# Patient Record
Sex: FEMALE | Race: WHITE | Employment: OTHER | URBAN - METROPOLITAN AREA
[De-identification: names, ages, dates, MRNs, and addresses within clinical notes are randomized per-mention and may not be internally consistent; named-entity substitution may affect disease eponyms.]

---

## 2017-01-18 LAB
ALBUMIN SERPL-MCNC: 4.4 G/DL (ref 3.5–4.8)
ALBUMIN/GLOB SERPL: 1.7 {RATIO} (ref 1.1–2.5)
ALP SERPL-CCNC: 76 IU/L (ref 39–117)
ALT SERPL-CCNC: 11 IU/L (ref 0–32)
AST SERPL-CCNC: 22 IU/L (ref 0–40)
BILIRUB SERPL-MCNC: 0.6 MG/DL (ref 0–1.2)
BUN SERPL-MCNC: 11 MG/DL (ref 8–27)
BUN/CREAT SERPL: 12 (ref 11–26)
CALCIUM SERPL-MCNC: 9.6 MG/DL (ref 8.7–10.3)
CHLORIDE SERPL-SCNC: 101 MMOL/L (ref 96–106)
CHOLEST SERPL-MCNC: 215 MG/DL (ref 100–199)
CO2 SERPL-SCNC: 26 MMOL/L (ref 18–29)
CREAT SERPL-MCNC: 0.89 MG/DL (ref 0.57–1)
GLOBULIN SER CALC-MCNC: 2.6 G/DL (ref 1.5–4.5)
GLUCOSE SERPL-MCNC: 109 MG/DL (ref 65–99)
HDL SERPL-SCNC: 28.4 UMOL/L
HDLC SERPL-MCNC: 37 MG/DL
INTERPRETATION, 910389: NORMAL
LDL SERPL QN: 20.1 NM
LDL SERPL-SCNC: 1775 NMOL/L
LDL SMALL SERPL-SCNC: 1183 NMOL/L
LDLC SERPL CALC-MCNC: 149 MG/DL (ref 0–99)
LP-IR SCORE SERPL: 64
POTASSIUM SERPL-SCNC: 4.2 MMOL/L (ref 3.5–5.2)
PROT SERPL-MCNC: 7 G/DL (ref 6–8.5)
SODIUM SERPL-SCNC: 143 MMOL/L (ref 134–144)
TRIGL SERPL-MCNC: 144 MG/DL (ref 0–149)

## 2017-01-19 ENCOUNTER — OFFICE VISIT (OUTPATIENT)
Dept: CARDIOLOGY CLINIC | Age: 77
End: 2017-01-19

## 2017-01-19 VITALS
HEART RATE: 58 BPM | HEIGHT: 62 IN | DIASTOLIC BLOOD PRESSURE: 86 MMHG | SYSTOLIC BLOOD PRESSURE: 140 MMHG | WEIGHT: 167 LBS | BODY MASS INDEX: 30.73 KG/M2

## 2017-01-19 DIAGNOSIS — E78.00 HYPERCHOLESTEROLEMIA: ICD-10-CM

## 2017-01-19 DIAGNOSIS — I77.9 BILATERAL CAROTID ARTERY DISEASE (HCC): ICD-10-CM

## 2017-01-19 DIAGNOSIS — I10 ESSENTIAL HYPERTENSION: Primary | ICD-10-CM

## 2017-01-19 RX ORDER — ROSUVASTATIN CALCIUM 10 MG/1
10 TABLET, COATED ORAL
Qty: 90 TAB | Refills: 3 | Status: SHIPPED | OUTPATIENT
Start: 2017-01-19 | End: 2017-11-13 | Stop reason: SDUPTHER

## 2017-01-19 RX ORDER — ASCORBIC ACID 500 MG
TABLET ORAL
COMMUNITY
End: 2017-04-28

## 2017-01-19 RX ORDER — HYDROCHLOROTHIAZIDE 25 MG/1
25 TABLET ORAL DAILY
Qty: 30 TAB | Refills: 0
Start: 2017-01-19 | End: 2017-10-31 | Stop reason: SDUPTHER

## 2017-01-19 RX ORDER — NEBIVOLOL 20 MG/1
20 TABLET ORAL DAILY
Qty: 28 TAB | Refills: 0 | Status: SHIPPED | COMMUNITY
Start: 2017-01-19 | End: 2017-10-31 | Stop reason: SDUPTHER

## 2017-01-19 NOTE — PATIENT INSTRUCTIONS

## 2017-01-19 NOTE — PROGRESS NOTES
Caesar Meléndez MD. [facility] [place]              Patient: Emerson Flores  :       Today's Date: 2017          HISTORY OF PRESENT ILLNESS:     History of Present Illness:  Ms. Gil Mondragon is here for follow-up. She is a little more tired than usual - not as active due to knee problems and the winter. No CP or SOB. BP has been OK. -140's. PAST MEDICAL HISTORY:     Past Medical History   Diagnosis Date    Arthritis     Asthma      albuterol    Elevated glucose      borderline elevated A1c    Hypercholesterolemia     Hypertension     MALINI on CPAP          Past Surgical History   Procedure Laterality Date    Echo stress       normal resting LV wall motion and no evidence of ischemia. The ejection fraction was 50-60%.  Holter monitor       frequent ventricular extrasystoles. Rare couplets and triplets. NSR during diary entries.  Hx hysterectomy      Hx other surgical       CARPAL TUNNEL BILATERAL, TRIGGER THUMB RELEASE    Hx orthopaedic       R RCR    Hx orthopaedic       R KNEE MENISCUS    Hx other surgical       Carotid dopplers - mild disease bilat    Hx other surgical       Lexiscan Cardiolite 12 - normal; LVEF 65%    Hx other surgical       Echo 12 - TDS, LVEF 60%, no sig valve disease, RVSP 25    Hx other surgical       Carotid Duplex 3/14/13 - 10-49% stenosis bilat     Hx other surgical       LE ZE's 3/14/13 - normal ZE's     Hx orthopaedic  2013     left tibia repair           MEDICATIONS:     Current Outpatient Prescriptions   Medication Sig Dispense Refill    ascorbic acid, vitamin C, (VITAMIN C) 500 mg tablet Take  by mouth.  hydroCHLOROthiazide (HYDRODIURIL) 25 mg tablet Take 1 Tab by mouth daily. 30 Tab 0    nebivolol (BYSTOLIC) 20 mg tablet Take 1 Tab by mouth daily. 28 Tab 0    pravastatin (PRAVACHOL) 20 mg tablet Take 1 Tab by mouth nightly.  90 Tab 3    aspirin delayed-release 81 mg tablet Take  by mouth daily.  potassium chloride (K-DUR, KLOR-CON) 10 mEq tablet Take 1 Tab by mouth two (2) times a day. 60 Tab 11    cyanocobalamin (VITAMIN B-12) 1,000 mcg/mL injection 1,000 mcg by IntraMUSCular route once. MONTHLY      MULTIVITAMIN (MULTIPLE VITAMINS PO) Take  by mouth daily.  ALBUTEROL SULFATE (PROVENTIL HFA IN) Take  by inhalation as needed.  108mcg/act PRN         Allergies   Allergen Reactions    Adhesive Tape-Silicones Rash    Azithromycin Shortness of Breath     Heart slows down    Codeine Hives     N/V    Dilaudid [Hydromorphone (Bulk)] Nausea and Vomiting    Dilaudid [Hydromorphone] Unknown (comments)    Ivp Dye [Fd And C Blue No.1] Hives     SWELLING    Norvasc [Amlodipine] Other (comments)     ELEVATED BP    Novocain [Procaine] Unknown (comments)    Sectral [Acebutolol] Hives and Swelling    Sulfa (Sulfonamide Antibiotics) Hives    Talwin [Pentazocine Lactate] Swelling    Tetanus Vaccines And Toxoid Shortness of Breath    Vasotec [Enalapril Maleate] Other (comments)     ELEVATED BP             SOCIAL HISTORY:     Social History   Substance Use Topics    Smoking status: Never Smoker    Smokeless tobacco: Never Used    Alcohol use No           FAMILY HISTORY:     Family History   Problem Relation Age of Onset    Cancer Mother      LUNG    Heart Disease Father 46    Coronary Artery Disease Father     Dementia Sister     Heart Disease Brother     Cancer Brother      COLON            REVIEW OF SYSTEMS:        Review of Systems:    Constitutional: Negative for fever, chills    HEENT: Negative for vision changes.    Respiratory: no cough    Cardiovascular: Negative for orthopnea, syncope, and PND.    Gastrointestinal: Negative for abdominal pain,or melena    Genitourinary: Negative for dysuria    Musculoskeletal: Negative for myalgias.    Skin: Negative for rash    Heme: No problems bleeding.    Neurological: Negative for speech change and focal weakness.    + restless leg syndrome    + Urinary frequency, diarrhea             PHYSICAL EXAM:        Physical Exam:    Visit Vitals    /86    Pulse (!) 58    Ht 5' 2\" (1.575 m)    Wt 167 lb (75.8 kg)    BMI 30.54 kg/m2      Patient appears generally well, mood and affect are appropriate and pleasant.    HEENT: Normocephalic, atraumatic. Morley Bones anicteric.  Hearing intact.    Neck Exam: Supple, no bruits    Lung Exam: Clear to auscultation, even breath sounds.    Cardiac Exam: Regular rate and rhythm with 1/6 systolic murmur    Abdomen: Soft, non-tender, normal bowel sounds.    Extremities: No lower extremity edema.    Vasc - 2+ DP's  Psych - appropriate affect    Neuro - non focal          LABS / OTHER STUDIES:         Component      Latest Ref Rng & Units 1/17/2017           7:39 AM   LDL-P      <1000 nmol/L 1775 (H)   LDL-C      0 - 99 mg/dL 149 (H)   HDL-C      >39 mg/dL 37 (L)   Triglycerides      0 - 149 mg/dL 144   Cholesterol, total      100 - 199 mg/dL 215 (H)   HDL-P (Total)      >=30.5 umol/L 28.4 (L)   Small LDL-P      <=527 nmol/L 1183 (H)   LDL size      >20.5 nm 20.1   LP-IR SCORE      <=45 64 (H)     Lab Results   Component Value Date/Time    Sodium 143 01/17/2017 07:39 AM    Potassium 4.2 01/17/2017 07:39 AM    Chloride 101 01/17/2017 07:39 AM    CO2 26 01/17/2017 07:39 AM    Anion gap 9 04/14/2013 04:20 AM    Glucose 109 01/17/2017 07:39 AM    BUN 11 01/17/2017 07:39 AM    Creatinine 0.89 01/17/2017 07:39 AM    BUN/Creatinine ratio 12 01/17/2017 07:39 AM    GFR est AA 73 01/17/2017 07:39 AM    GFR est non-AA 63 01/17/2017 07:39 AM    Calcium 9.6 01/17/2017 07:39 AM    Bilirubin, total 0.6 01/17/2017 07:39 AM    ALT 11 01/17/2017 07:39 AM    AST 22 01/17/2017 07:39 AM    Alk.  phosphatase 76 01/17/2017 07:39 AM    Protein, total 7.0 01/17/2017 07:39 AM    Albumin 4.4 01/17/2017 07:39 AM    Globulin 3.5 04/09/2013 08:08 PM    A-G Ratio 1.7 01/17/2017 07:39 AM           Labs 5/16 - CMP OK (glc 106), chol 212, , HDL 35, , A1c 5.4  Labs 11/16 - , chol 197, HDL 37, , CMP OK             CARDIAC DIAGNOSTICS:       EKG 1/7/16 - sinus bradycardia, normal   EKG 1/19/17 - sinus marylin, otherwise normal     Carotid Duplex 7/14/16 - 0-9% RITA and 60-01% LICA           ASSESSMENT AND PLAN:        Assessment and Plan:    1) HTN    - She had problems with lightheadedness which is better now. Symptoms have been suggestive of orthostatic hypotension. She has supine HTN. Previously symptoms improved somewhat after cutting back Hyzaar. Due to risk of orthostasis with Hyzaar, I stopped that. I then tried labetalol, but due to vague complaints with labetalol I had to stop that. Due to leg pains, we stopped Norvasc. Due to dry mouth, she couldn't tolerate even 0.1 QHS of clonidine.    - She is tolerating Bystolic 20 mg daily and HCTZ 25 mg daily.   - Her dizziness is better lately and now very brief. Would be OK with -140's.    - She is working on salt reduction    - Will continue meds as they are as BP looks good.       2) Mild carotid artery disease   - she is on a statin and ASA      3) History HYLTON and atypical chest pain    - lexiscan cardiolite and echo were normal previously. - She denies any clear anginal complaints.    4) Dyslipidemia    - we stopped atorvastatin and zeta given muscle complaints - those symptoms are better now    - I increased her pravastatin to 40 mg and she did OK at first.  But then she had some shoulder pain. Reducing the dose helped improve those symptoms.   - She is tolerating pravastatin at 20 mg   - LDL-P on 7/8/16 is high and we added Zetia at that time - but due to cost she did not take it.   - On 1/17, LDL remains high. Will switch from pravastatin to Crestor 10 mg daily. Recheck lipids in 3 months. - work on eating healthier       5) Preop Evaluation    - Ms. Emi Arellano may have knee surgery in in near future  (Dr. Max Robles)  - She is active (yardwork) without problems  - She can proceed with knee surgery and should have low CV risk.  She can hold her ASA a week prior to surgery if necessary.    6) RTC in 3 months.  Patient expressed understanding of the plan - questions were answered.                Glen Stauffer MD, [address], [address]  [address] [place] [address].  [address], [facility]. Theo Pagan.  [address]  Ph: [phone]   Ph [phone]

## 2017-04-24 ENCOUNTER — HOSPITAL ENCOUNTER (OUTPATIENT)
Dept: LAB | Age: 77
Discharge: HOME | End: 2017-04-24
Payer: MEDICARE

## 2017-04-24 PROCEDURE — 80053 COMPREHEN METABOLIC PANEL: CPT

## 2017-04-24 PROCEDURE — 36415 COLL VENOUS BLD VENIPUNCTURE: CPT

## 2017-04-24 PROCEDURE — 80061 LIPID PANEL: CPT

## 2017-04-25 LAB
ALBUMIN SERPL-MCNC: 4.3 G/DL (ref 3.5–4.8)
ALBUMIN/GLOB SERPL: 1.6 {RATIO} (ref 1.2–2.2)
ALP SERPL-CCNC: 77 IU/L (ref 39–117)
ALT SERPL-CCNC: 14 IU/L (ref 0–32)
AST SERPL-CCNC: 20 IU/L (ref 0–40)
BILIRUB SERPL-MCNC: 0.4 MG/DL (ref 0–1.2)
BUN SERPL-MCNC: 12 MG/DL (ref 8–27)
BUN/CREAT SERPL: 14 (ref 12–28)
CALCIUM SERPL-MCNC: 9.3 MG/DL (ref 8.7–10.3)
CHLORIDE SERPL-SCNC: 106 MMOL/L (ref 96–106)
CHOLEST SERPL-MCNC: 144 MG/DL (ref 100–199)
CO2 SERPL-SCNC: 24 MMOL/L (ref 18–29)
CREAT SERPL-MCNC: 0.84 MG/DL (ref 0.57–1)
GLOBULIN SER CALC-MCNC: 2.7 G/DL (ref 1.5–4.5)
GLUCOSE SERPL-MCNC: 101 MG/DL (ref 65–99)
HDLC SERPL-MCNC: 34 MG/DL
INTERPRETATION, 910389: NORMAL
LDLC SERPL CALC-MCNC: 79 MG/DL (ref 0–99)
POTASSIUM SERPL-SCNC: 4.4 MMOL/L (ref 3.5–5.2)
PROT SERPL-MCNC: 7 G/DL (ref 6–8.5)
SODIUM SERPL-SCNC: 144 MMOL/L (ref 134–144)
TRIGL SERPL-MCNC: 156 MG/DL (ref 0–149)
VLDLC SERPL CALC-MCNC: 31 MG/DL (ref 5–40)

## 2017-04-28 ENCOUNTER — OFFICE VISIT (OUTPATIENT)
Dept: CARDIOLOGY CLINIC | Age: 77
End: 2017-04-28

## 2017-04-28 VITALS
BODY MASS INDEX: 30 KG/M2 | DIASTOLIC BLOOD PRESSURE: 84 MMHG | SYSTOLIC BLOOD PRESSURE: 127 MMHG | HEART RATE: 58 BPM | WEIGHT: 163 LBS | HEIGHT: 62 IN | RESPIRATION RATE: 20 BRPM | OXYGEN SATURATION: 95 %

## 2017-04-28 DIAGNOSIS — I77.9 BILATERAL CAROTID ARTERY DISEASE (HCC): ICD-10-CM

## 2017-04-28 DIAGNOSIS — I10 ESSENTIAL HYPERTENSION: Primary | ICD-10-CM

## 2017-04-28 DIAGNOSIS — E78.00 HYPERCHOLESTEROLEMIA: ICD-10-CM

## 2017-04-28 NOTE — PROGRESS NOTES
Visit Vitals    /84 (BP 1 Location: Left arm, BP Patient Position: Sitting)    Pulse (!) 58    Resp 20    Ht 5' 2\" (1.575 m)    Wt 163 lb (73.9 kg)    SpO2 95%    BMI 29.81 kg/m2     Pt states she has had a few episodes of a squeezing feeling  Up under the breast area to her back and also felt the pain in her right jaw.    Pt has a questing about Vit D3  LABS done on the 24th

## 2017-04-28 NOTE — PROGRESS NOTES
Monica Mott MD. [facility] [place]              Patient: Giovana Carpenter  :       Today's Date: 2017            HISTORY OF PRESENT ILLNESS:     History of Present Illness:  Ms. Irma Curran is here for follow-up. She has joint pain in thumb. Has three episodes of tightness under breast - randomly - better with tums. No exertional pain. No SOB. PAST MEDICAL HISTORY:     Past Medical History:   Diagnosis Date    Arthritis     Asthma     albuterol    Elevated glucose     borderline elevated A1c    Hypercholesterolemia     Hypertension     MALINI on CPAP          Past Surgical History:   Procedure Laterality Date    ECHO STRESS      normal resting LV wall motion and no evidence of ischemia. The ejection fraction was 50-60%.  HOLTER MONITOR      frequent ventricular extrasystoles. Rare couplets and triplets. NSR during diary entries.  HX HYSTERECTOMY      HX ORTHOPAEDIC      R RCR    HX ORTHOPAEDIC      R KNEE MENISCUS    HX ORTHOPAEDIC  2013    left tibia repair    HX OTHER SURGICAL      CARPAL TUNNEL BILATERAL, TRIGGER THUMB RELEASE    HX OTHER SURGICAL      Carotid dopplers - mild disease bilat    HX OTHER SURGICAL      Lexiscan Cardiolite 12 - normal; LVEF 65%    HX OTHER SURGICAL      Echo 12 - TDS, LVEF 60%, no sig valve disease, RVSP 25    HX OTHER SURGICAL      Carotid Duplex 3/14/13 - 10-49% stenosis bilat     HX OTHER SURGICAL      LE ZE's 3/14/13 - normal ZE's            MEDICATIONS:     Current Outpatient Prescriptions   Medication Sig Dispense Refill    hydroCHLOROthiazide (HYDRODIURIL) 25 mg tablet Take 1 Tab by mouth daily. 30 Tab 0    rosuvastatin (CRESTOR) 10 mg tablet Take 1 Tab by mouth nightly. 90 Tab 3    nebivolol (BYSTOLIC) 20 mg tablet Take 1 Tab by mouth daily. 28 Tab 0    aspirin delayed-release 81 mg tablet Take  by mouth daily.       cyanocobalamin (VITAMIN B-12) 1,000 mcg/mL injection 1,000 mcg by IntraMUSCular route once. MONTHLY      MULTIVITAMIN (MULTIPLE VITAMINS PO) Take  by mouth daily.  ALBUTEROL SULFATE (PROVENTIL HFA IN) Take  by inhalation as needed.  108mcg/act PRN         Allergies   Allergen Reactions    Adhesive Tape-Silicones Rash    Azithromycin Shortness of Breath     Heart slows down    Codeine Hives     N/V    Dilaudid [Hydromorphone (Bulk)] Nausea and Vomiting    Dilaudid [Hydromorphone] Unknown (comments)    Ivp Dye [Fd And C Blue No.1] Hives     SWELLING    Norvasc [Amlodipine] Other (comments)     ELEVATED BP    Novocain [Procaine] Unknown (comments)    Sectral [Acebutolol] Hives and Swelling    Sulfa (Sulfonamide Antibiotics) Hives    Talwin [Pentazocine Lactate] Swelling    Tetanus Vaccines And Toxoid Shortness of Breath    Vasotec [Enalapril Maleate] Other (comments)     ELEVATED BP             SOCIAL HISTORY:     Social History   Substance Use Topics    Smoking status: Never Smoker    Smokeless tobacco: Never Used    Alcohol use No           FAMILY HISTORY:     Family History   Problem Relation Age of Onset    Cancer Mother      LUNG    Heart Disease Father 46    Coronary Artery Disease Father     Dementia Sister     Heart Disease Brother     Cancer Brother      COLON          REVIEW OF SYSTEMS:         Review of Systems:    Constitutional: Negative for fever, chills    HEENT: Negative for vision changes.    Respiratory: no cough    Cardiovascular: Negative for orthopnea, syncope, and PND.    Gastrointestinal: Negative for abdominal pain,or melena    Genitourinary: Negative for dysuria    Musculoskeletal: Negative for myalgias.    Skin: Negative for rash    Heme: No problems bleeding.    Neurological: Negative for speech change and focal weakness.    + restless leg syndrome    + Urinary frequency, diarrhea               PHYSICAL EXAM:         Physical Exam:      Visit Vitals    /84 (BP 1 Location: Left arm, BP Patient Position: Sitting)    Pulse (!) 58    Resp 20    Ht 5' 2\" (1.575 m)    Wt 163 lb (73.9 kg)    SpO2 95%    BMI 29.81 kg/m2     Patient appears generally well, mood and affect are appropriate and pleasant.    HEENT: Normocephalic, atraumatic. Nuvia Endo anicteric.  Hearing intact.    Neck Exam: Supple, no bruits    Lung Exam: Clear to auscultation, even breath sounds.    Cardiac Exam: Regular rate and rhythm with 1/6 systolic murmur    Abdomen: Soft, non-tender, normal bowel sounds.    Extremities: No lower extremity edema.    Vasc - 2+ DP's  Psych - appropriate affect    Neuro - non focal           LABS / OTHER STUDIES:           Component  Latest Ref Rng & Units 1/17/2017      7:39 AM   LDL-P  <1000 nmol/L 1775 (H)   LDL-C  0 - 99 mg/dL 149 (H)   HDL-C  >39 mg/dL 37 (L)   Triglycerides  0 - 149 mg/dL 144   Cholesterol, total  100 - 199 mg/dL 215 (H)   HDL-P (Total)  >=30.5 umol/L 28.4 (L)   Small LDL-P  <=527 nmol/L 1183 (H)   LDL size  >20.5 nm 20.1   LP-IR SCORE  <=45 64 (H)            Lab Results   Component Value Date/Time     Sodium 143 01/17/2017 07:39 AM     Potassium 4.2 01/17/2017 07:39 AM     Chloride 101 01/17/2017 07:39 AM     CO2 26 01/17/2017 07:39 AM     Anion gap 9 04/14/2013 04:20 AM     Glucose 109 01/17/2017 07:39 AM     BUN 11 01/17/2017 07:39 AM     Creatinine 0.89 01/17/2017 07:39 AM     BUN/Creatinine ratio 12 01/17/2017 07:39 AM     GFR est AA 73 01/17/2017 07:39 AM     GFR est non-AA 63 01/17/2017 07:39 AM     Calcium 9.6 01/17/2017 07:39 AM     Bilirubin, total 0.6 01/17/2017 07:39 AM     ALT 11 01/17/2017 07:39 AM     AST 22 01/17/2017 07:39 AM     Alk.  phosphatase 76 01/17/2017 07:39 AM     Protein, total 7.0 01/17/2017 07:39 AM     Albumin 4.4 01/17/2017 07:39 AM     Globulin 3.5 04/09/2013 08:08 PM     A-G Ratio 1.7 01/17/2017 07:39 AM      Lab Results   Component Value Date/Time    Cholesterol, total 144 04/24/2017 07:42 AM    HDL Cholesterol 34 04/24/2017 07:42 AM    LDL, calculated 79 04/24/2017 07:42 AM VLDL, calculated 31 04/24/2017 07:42 AM    Triglyceride 156 04/24/2017 07:42 AM     Lab Results   Component Value Date/Time    Sodium 144 04/24/2017 07:42 AM    Potassium 4.4 04/24/2017 07:42 AM    Chloride 106 04/24/2017 07:42 AM    CO2 24 04/24/2017 07:42 AM    Anion gap 9 04/14/2013 04:20 AM    Glucose 101 04/24/2017 07:42 AM    BUN 12 04/24/2017 07:42 AM    Creatinine 0.84 04/24/2017 07:42 AM    BUN/Creatinine ratio 14 04/24/2017 07:42 AM    GFR est AA 78 04/24/2017 07:42 AM    GFR est non-AA 68 04/24/2017 07:42 AM    Calcium 9.3 04/24/2017 07:42 AM    Bilirubin, total 0.4 04/24/2017 07:42 AM    AST (SGOT) 20 04/24/2017 07:42 AM    Alk. phosphatase 77 04/24/2017 07:42 AM    Protein, total 7.0 04/24/2017 07:42 AM    Albumin 4.3 04/24/2017 07:42 AM    Globulin 3.5 04/09/2013 08:08 PM    A-G Ratio 1.6 04/24/2017 07:42 AM    ALT (SGPT) 14 04/24/2017 07:42 AM                  CARDIAC DIAGNOSTICS:        EKG 1/7/16 - sinus bradycardia, normal   EKG 1/19/17 - sinus marylin, otherwise normal      Carotid Duplex 7/14/16 - 0-9% RITA and 35-18% LICA             ASSESSMENT AND PLAN:         Assessment and Plan:    1) HTN    - She had problems with lightheadedness which is better now. Symptoms have been suggestive of orthostatic hypotension. She has supine HTN. Previously symptoms improved somewhat after cutting back Hyzaar. Due to risk of orthostasis with Hyzaar, I stopped that. I then tried labetalol, but due to vague complaints with labetalol I had to stop that. Due to leg pains, we stopped Norvasc. Due to dry mouth, she couldn't tolerate even 0.1 QHS of clonidine.    - She is tolerating Bystolic 20 mg daily and HCTZ 25 mg daily.   - Her dizziness is better lately and now very brief.  Would be OK with -140's.    - She is working on salt reduction    - Will continue meds as they are as BP looks good.        2) Mild carotid artery disease   - she is on a statin and ASA   - recheck carotids       3) History HYLTON and atypical chest pain    - lexiscan cardiolite and echo were normal previously. - Given chest squeezing, will recheck an [address] (try walking on Edd protocol first; knee pain limits walking) and echo       4) Dyslipidemia    - we stopped atorvastatin and zeta given muscle complaints - those symptoms are better now    - I increased her pravastatin to 40 mg and she did OK at first.  But then she had some shoulder pain. Reducing the dose helped improve those symptoms.   - She is tolerating pravastatin at 20 mg   - LDL-P on 7/8/16 is high and we added Zetia at that time - but due to cost she did not take it.   - On 1/17, LDL remains high. Switched from pravastatin to Crestor 10 mg daily. - 4/17 labs look good. She is tolerating Crestor.    - work on eating healthier       5) Preop Evaluation    - Ms. Rain Barrios may have knee surgery in in near future  (Dr. Windy Coleman)  - She is active (yardwork) without problems  - She can proceed with knee surgery and should have low CV risk (as long testing above OK).   She can hold her ASA a week prior to surgery if necessary.       6) Phone FU after testing. RTC in 6 months.  Patient expressed understanding of the plan - questions were answered.                   Obey Olmstead MD, [address] [address], [address]  [address], [address] [place] [address]  Ph: [phone] Ph [phone]        ADDENDUM   5/18/2017  Carotid Dopplers 5/18/17 - 10-49% stenosis bilat     Exercise Cardiolite 5/18/17 - walked 4:19 (7.0 METS) - normal stress EKG and MPI. LVEF 67%    Echo 5/18/17 - LVEF 60%, grade 1 diastology. RV normal.  Mild MR.  RVSP 37    Will call pt    ADDENDUM   5/22/2017  I called patient - tests look OK.

## 2017-04-28 NOTE — MR AVS SNAPSHOT
Visit Information Date & Time Provider Department Dept. Phone Encounter #  
 4/28/2017  8:40 AM Brittany Castillo MD [facility] [phone] [id number] Your Appointments 5/18/2017  8:00 AM  
ECHO CARDIOGRAMS 2D with ECHOTRODRIGUEZ IRWIN  
[facility] ([facility]) Appt Note: echo at 8am vas at 9 am 1 day panda card try walking dx cp carotid  ht 5'2 [address]  
[address] [address] [address] 5/18/2017  9:00 AM  
VASCULAR TEST with VASCULAR, [facility]  
[facility] ([place] SCHEDUL[facility]) Appt Note: echo at 8am vas at 9 am 1 day pnada card try walking dx cp carotid  ht 5'2 wt [address]  
[address] [address] [address] 5/18/2017 10:00 AM  
NUCLEAR MEDICINE with NUCLEAR [facility]  
[facility] ([place] SCHED[facility]) Appt Note: echo at 8am vas at 9 am 1 day panda card try walking dx cp carotid  ht 5'2 wt [address] [facility] [address] [phone]  
[phone]  
  
   
 [address] [place] [address]  
  
    
 10/31/2017  9:20 AM  
ESTABLISHED PATIENT with Brittany Castillo MD  
[facility] ([facility]) Appt Note: 6 mo fu  
 [address]  
[address] [address] [address] Upcoming Health Maintenance Date Due DTaP/Tdap/Td series (1 - Tdap) 8/19/1961 ZOSTER VACCINE AGE 60> 8/19/2000 GLAUCOMA SCREENING Q2Y 8/19/2005 OSTEOPOROSIS SCREENING (DEXA) 8/19/2005 MEDICARE YEARLY EXAM 8/19/2005 Pneumococcal 65+ Low/Medium Risk (2 of 2 - PPSV23) 1/15/2011 INFLUENZA AGE 9 TO ADULT 8/1/2016 Allergies as of 4/28/2017  Review Complete On: 4/28/2017 By: Luis Alberto Borjas MD  
  
 Severity Noted Reaction Type Reactions Adhesive Tape-silicones  17/56/7368    Rash Azithromycin  07/14/2016    Shortness of Breath Heart slows down Codeine  11/11/2010    Hives N/V  
 Dilaudid [Hydromorphone (Bulk)]  03/08/2012    Nausea and Vomiting Dilaudid [Hydromorphone]  05/29/2012    Unknown (comments) Ivp Dye [Fd And C Blue No.1]  11/11/2010    Hives SWELLING Norvasc [Amlodipine]  03/08/2012    Other (comments) ELEVATED BP Novocain [Procaine]  04/10/2013   Not Verified Unknown (comments) Sectral [Acebutolol]  11/11/2010   Side Effect Hives, Swelling Sulfa (Sulfonamide Antibiotics)  03/08/2012    Hives Talwin [Pentazocine Lactate]  11/11/2010    Swelling Tetanus Vaccines And Toxoid  11/11/2010    Shortness of Breath Vasotec [Enalapril Maleate]  11/11/2010    Other (comments) ELEVATED BP Current Immunizations  Reviewed on 4/9/2013 Name Date Influenza Vaccine 10/1/2012 Pneumococcal Vaccine (Unspecified Type) 1/15/2006 Not reviewed this visit You Were Diagnosed With   
  
 Codes Comments Essential hypertension    -  Primary ICD-10-CM: I10 
ICD-9-CM: 401.9 Hypercholesterolemia     ICD-10-CM: E78.00 ICD-9-CM: 272.0 Bilateral carotid artery disease ([facility] [address]     ICD-10-CM: I77.9 ICD-9-CM: 470. 9 Vitals BP Pulse Resp Height(growth percentile) Weight(growth percentile) SpO2  
 127/84 (BP 1 Location: Left arm, BP Patient Position: Sitting) (!) 58 20 5' 2\" (1.575 m) 163 lb (73.9 kg) 95% BMI OB Status Smoking Status 29.81 kg/m2 Hysterectomy Never Smoker Vitals History BMI and BSA Data Body Mass Index Body Surface Area  
 29.81 kg/m 2 1.8 m 2 Preferred Pharmacy Pharmacy Name Phone [facility] [address] [phone] Your Updated Medication List  
  
   
This list is accurate as of: 4/28/17  9:09 AM.  Always use your most recent med list.  
  
  
  
  
 aspirin delayed-release 81 mg tablet Take  by mouth daily. hydroCHLOROthiazide 25 mg tablet Commonly known as:  HYDRODIURIL Take 1 Tab by mouth daily. MULTIPLE VITAMINS PO Take  by mouth daily. nebivolol 20 mg tablet Commonly known as:  BYSTOLIC Take 1 Tab by mouth daily. PROVENTIL HFA IN Take  by inhalation as needed. 108mcg/act PRN  
  
 rosuvastatin 10 mg tablet Commonly known as:  CRESTOR Take 1 Tab by mouth nightly. VITAMIN B-12 1,000 mcg/mL injection Generic drug:  cyanocobalamin  
1,000 mcg by IntraMUSCular route once. MONTHLY We Performed the Following LIPID PANEL [48455 CPT(R)] METABOLIC PANEL, COMPREHENSIVE [34751 CPT(R)] Introducing [facility]! Dear Richy Lopez: 
Thank you for requesting a [employer] account. Our records indicate that you already have an active [employer] account. You can access your account anytime at [email or web address] Did you know that you can access your hospital and ER discharge instructions at any time in [employer]? You can also review all of your test results from your hospital stay or ER visit. Additional Information If you have questions, please visit the Frequently Asked Questions section of the [employer] website at [email or web address] Remember, [employer] is NOT to be used for urgent needs. For medical emergencies, dial 911. Now available from your iPhone and Android! Please provide this summary of care documentation to your next provider. Your primary care clinician is listed as Gonsalo Rangel. If you have any questions after today's visit, please call [phone].

## 2017-05-18 ENCOUNTER — CLINICAL SUPPORT (OUTPATIENT)
Dept: CARDIOLOGY CLINIC | Age: 77
End: 2017-05-18

## 2017-05-18 DIAGNOSIS — I77.9 BILATERAL CAROTID ARTERY DISEASE (HCC): ICD-10-CM

## 2017-05-18 DIAGNOSIS — I10 ESSENTIAL HYPERTENSION: Primary | ICD-10-CM

## 2017-05-18 DIAGNOSIS — I10 ESSENTIAL HYPERTENSION: ICD-10-CM

## 2017-05-18 DIAGNOSIS — R07.89 CHEST DISCOMFORT: Primary | ICD-10-CM

## 2017-05-18 DIAGNOSIS — E78.00 HYPERCHOLESTEROLEMIA: ICD-10-CM

## 2017-05-18 DIAGNOSIS — I65.23 CAROTID STENOSIS, BILATERAL: Primary | ICD-10-CM

## 2017-05-18 DIAGNOSIS — G47.33 OSA (OBSTRUCTIVE SLEEP APNEA): ICD-10-CM

## 2017-05-18 NOTE — PROGRESS NOTES
Explained procedure to patient, Obtaining IV access, radiation exposure, risks and discomforts (for exercise stress test), waiting between injections and obtaining images. All concerns and questions addressed. See scanned report.  ordered and Dr. Avery Lai read study. ID verified per protocol. Pt  reported no symptoms at completion of protocol. Late entry - 5/22/17 - Per Dr Avery Lai note from 4/28/17, try Edd protocol first and switch to [address] [place] [address] if needed. Pt was able to walk and obtain peak heart rate for injection. Orders placed late.

## 2017-05-18 NOTE — PROCEDURES
[facility]  *** FINAL REPORT ***    Name: Kristen Lowry  MRN: [id number]       Outpatient  : 19 Aug 1940  HIS Order #: [id number]  [address] Visit #: [id number]  Date: 18 May 2017    TYPE OF TEST: Cerebrovascular Duplex    REASON FOR TEST  Known carotid stenosis    Right Carotid:-             Proximal               Mid                 Distal  cm/s  Systolic  Diastolic  Systolic  Diastolic  Systolic  Diastolic  CCA:     51.6      15.0                            68.0      18.0  Bulb:    67.0      16.0  ECA:     89.0      10.0  ICA:     59.0      11.0       83.0      26.0       85.0      27.0  ICA/CCA:  0.9       0.6    ICA Stenosis: <50%    Right Vertebral:-  Finding: Antegrade  Sys:       97.0  Millie:       16.0    Right Subclavian:    Left Carotid:-            Proximal                Mid                 Distal  cm/s  Systolic  Diastolic  Systolic  Diastolic  Systolic  Diastolic  CCA:     30.7      16.0                            81.0      17.0  Bulb:    82.0      14.0  ECA:    118.0      15.0  ICA:     75.0      15.0       93.0      24.0       79.0      19.0  ICA/CCA:  0.9       0.9    ICA Stenosis: <50%    Left Vertebral:-  Finding: Antegrade  Sys:       76.0  Millie:       21.0    Left Subclavian:    INTERPRETATION/FINDINGS  PROCEDURE:  Evaluation of the extracranial cerebrovascular arteries  with ultrasound (B-mode imaging, pulsed Doppler, color Doppler). Includes the common carotid, internal carotid, external carotid, and  vertebral arteries. FINDINGS:    Right:  Mild focal plaque is exhibited at the origin of the internal   carotid artery. No significant filling defect is seen on color flow  imaging and peak systolic velocities are normal at 85 cm/s. Left:  Mild heterogeneous plaque is visualized at the level of the  bifurcation extending into the proximal internal carotid artery.   Color flow imaging reveals a mild filling defect and peak systolic  velocities are recorded at 93 cm/s.    IMPRESSION: Findings are consistent with 10-49% stenosis of the right  internal carotid and 10-49% stenosis of the left internal carotid. Vertebrals are patent with antegrade flow. COMPARISON:  In comparison to the previous study done on 7/14/2016,  there is no evidence of a significant progression of stenosis on  today's exam.    ADDITIONAL COMMENTS    I have personally reviewed the data relevant to the interpretation of  this  study.     TECHNOLOGIST: SMITHA Baptiste  Signed: 05/18/2017 10:10 AM    PHYSICIAN: Jono Nuñez MD, [facility]  Signed: 05/18/2017 06:20 PM

## 2017-10-25 ENCOUNTER — HOSPITAL ENCOUNTER (OUTPATIENT)
Dept: LAB | Age: 77
Discharge: HOME | End: 2017-10-25
Payer: MEDICARE

## 2017-10-25 PROCEDURE — 80053 COMPREHEN METABOLIC PANEL: CPT

## 2017-10-25 PROCEDURE — 36415 COLL VENOUS BLD VENIPUNCTURE: CPT

## 2017-10-25 PROCEDURE — 80061 LIPID PANEL: CPT

## 2017-10-26 LAB
ALBUMIN SERPL-MCNC: 4.2 G/DL (ref 3.5–4.8)
ALBUMIN/GLOB SERPL: 1.7 {RATIO} (ref 1.2–2.2)
ALP SERPL-CCNC: 75 IU/L (ref 39–117)
ALT SERPL-CCNC: 12 IU/L (ref 0–32)
AST SERPL-CCNC: 21 IU/L (ref 0–40)
BILIRUB SERPL-MCNC: 0.5 MG/DL (ref 0–1.2)
BUN SERPL-MCNC: 10 MG/DL (ref 8–27)
BUN/CREAT SERPL: 12 (ref 12–28)
CALCIUM SERPL-MCNC: 9.5 MG/DL (ref 8.7–10.3)
CHLORIDE SERPL-SCNC: 105 MMOL/L (ref 96–106)
CHOLEST SERPL-MCNC: 155 MG/DL (ref 100–199)
CO2 SERPL-SCNC: 24 MMOL/L (ref 18–29)
CREAT SERPL-MCNC: 0.83 MG/DL (ref 0.57–1)
GFR SERPLBLD CREATININE-BSD FMLA CKD-EPI: 68 ML/MIN/1.73
GFR SERPLBLD CREATININE-BSD FMLA CKD-EPI: 79 ML/MIN/1.73
GLOBULIN SER CALC-MCNC: 2.5 G/DL (ref 1.5–4.5)
GLUCOSE SERPL-MCNC: 98 MG/DL (ref 65–99)
HDLC SERPL-MCNC: 37 MG/DL
INTERPRETATION, 910389: NORMAL
LDLC SERPL CALC-MCNC: 91 MG/DL (ref 0–99)
POTASSIUM SERPL-SCNC: 4 MMOL/L (ref 3.5–5.2)
PROT SERPL-MCNC: 6.7 G/DL (ref 6–8.5)
SODIUM SERPL-SCNC: 142 MMOL/L (ref 134–144)
TRIGL SERPL-MCNC: 133 MG/DL (ref 0–149)
VLDLC SERPL CALC-MCNC: 27 MG/DL (ref 5–40)

## 2017-10-31 ENCOUNTER — OFFICE VISIT (OUTPATIENT)
Dept: CARDIOLOGY CLINIC | Age: 77
End: 2017-10-31

## 2017-10-31 VITALS
DIASTOLIC BLOOD PRESSURE: 82 MMHG | RESPIRATION RATE: 18 BRPM | WEIGHT: 163.6 LBS | BODY MASS INDEX: 30.11 KG/M2 | OXYGEN SATURATION: 95 % | HEART RATE: 57 BPM | HEIGHT: 62 IN | SYSTOLIC BLOOD PRESSURE: 162 MMHG

## 2017-10-31 DIAGNOSIS — I77.9 BILATERAL CAROTID ARTERY DISEASE (HCC): ICD-10-CM

## 2017-10-31 DIAGNOSIS — I10 ESSENTIAL HYPERTENSION: ICD-10-CM

## 2017-10-31 DIAGNOSIS — E78.00 HYPERCHOLESTEROLEMIA: ICD-10-CM

## 2017-10-31 RX ORDER — HYDROCHLOROTHIAZIDE 25 MG/1
25 TABLET ORAL
COMMUNITY
Start: 2017-01-19 | End: 2017-10-31 | Stop reason: SDUPTHER

## 2017-10-31 RX ORDER — NEBIVOLOL 20 MG/1
TABLET ORAL
COMMUNITY
Start: 2017-05-19 | End: 2017-10-31 | Stop reason: SDUPTHER

## 2017-10-31 RX ORDER — TRAZODONE HYDROCHLORIDE 50 MG/1
TABLET ORAL
COMMUNITY
Start: 2017-10-03 | End: 2018-05-02

## 2017-10-31 RX ORDER — ALBUTEROL SULFATE 90 UG/1
2 AEROSOL, METERED RESPIRATORY (INHALATION) AS NEEDED
COMMUNITY
End: 2018-05-21

## 2017-10-31 RX ORDER — ROSUVASTATIN CALCIUM 10 MG/1
10 TABLET, COATED ORAL
COMMUNITY
Start: 2017-01-19 | End: 2017-10-31 | Stop reason: SDUPTHER

## 2017-10-31 RX ORDER — MULTIVITAMIN
1 TABLET ORAL DAILY
COMMUNITY
End: 2018-05-02

## 2017-10-31 RX ORDER — AMLODIPINE BESYLATE 2.5 MG/1
2.5 TABLET ORAL DAILY
Qty: 90 TAB | Refills: 3 | Status: SHIPPED | OUTPATIENT
Start: 2017-10-31 | End: 2017-11-13 | Stop reason: SDUPTHER

## 2017-10-31 RX ORDER — HYDROCHLOROTHIAZIDE 25 MG/1
25 TABLET ORAL DAILY
Qty: 90 TAB | Refills: 3 | Status: SHIPPED | OUTPATIENT
Start: 2017-10-31 | End: 2017-11-13 | Stop reason: SDUPTHER

## 2017-10-31 RX ORDER — CLINDAMYCIN HYDROCHLORIDE 150 MG/1
CAPSULE ORAL
COMMUNITY
Start: 2017-10-12 | End: 2018-05-02

## 2017-10-31 NOTE — PROGRESS NOTES
Taco Thorne MD. [facility]              Patient: Criselda Ivy  :       Today's Date: 10/31/2017              HISTORY OF PRESENT ILLNESS:     History of Present Illness:  Ms. Lee Ann Hollingsworth is here for follow-up. She is doing well overall. No cardiac complaints. No CP. Some HYLTON. Has knee pain  From arthritis. BP is high at home (). PAST MEDICAL HISTORY:     Past Medical History:   Diagnosis Date    Arthritis     Asthma     albuterol    Elevated glucose     borderline elevated A1c    Hypercholesterolemia     Hypertension     MALINI on CPAP          Past Surgical History:   Procedure Laterality Date    ECHO STRESS      normal resting LV wall motion and no evidence of ischemia. The ejection fraction was 50-60%.  HOLTER MONITOR      frequent ventricular extrasystoles. Rare couplets and triplets. NSR during diary entries.  HX HYSTERECTOMY      HX ORTHOPAEDIC      R RCR    HX ORTHOPAEDIC      R KNEE MENISCUS    HX ORTHOPAEDIC  2013    left tibia repair    HX OTHER SURGICAL      CARPAL TUNNEL BILATERAL, TRIGGER THUMB RELEASE    HX OTHER SURGICAL      Carotid dopplers - mild disease bilat    HX OTHER SURGICAL      Lexiscan Cardiolite 12 - normal; LVEF 65%    HX OTHER SURGICAL      Echo 12 - TDS, LVEF 60%, no sig valve disease, RVSP 25    HX OTHER SURGICAL      Carotid Duplex 3/14/13 - 10-49% stenosis bilat     HX OTHER SURGICAL      LE ZE's 3/14/13 - normal ZE's            MEDICATIONS:     Current Outpatient Prescriptions   Medication Sig Dispense Refill    albuterol (PROVENTIL HFA, VENTOLIN HFA, PROAIR HFA) 90 mcg/actuation inhaler Take  by inhalation.  traZODone (DESYREL) 50 mg tablet       calcium-cholecalciferol, D3, (CALTRATE 600+D) tablet Take 1 Tab by mouth daily.  BYSTOLIC 20 mg tablet TAKE ONE TABLET BY MOUTH EVERY DAY 30 Tab 11    hydroCHLOROthiazide (HYDRODIURIL) 25 mg tablet Take 1 Tab by mouth daily. 30 Tab 0    rosuvastatin (CRESTOR) 10 mg tablet Take 1 Tab by mouth nightly. 90 Tab 3    aspirin delayed-release 81 mg tablet Take  by mouth daily.  cyanocobalamin (VITAMIN B-12) 1,000 mcg/mL injection 1,000 mcg by IntraMUSCular route once. MONTHLY      MULTIVITAMIN (MULTIPLE VITAMINS PO) Take  by mouth daily.  clindamycin (CLEOCIN) 150 mg capsule       ALBUTEROL SULFATE (PROVENTIL HFA IN) Take  by inhalation as needed. 108mcg/act PRN         Allergies   Allergen Reactions    Azithromycin Shortness of Breath     Heart slows down  Heart slows down    Tetanus Toxoid, Adsorbed Shortness of Breath    Adhesive Tape-Silicones Rash    Codeine Hives     N/V  N/V    Dilaudid [Hydromorphone (Bulk)] Nausea and Vomiting    Dilaudid [Hydromorphone] Unknown (comments)     Other reaction(s): Unknown (comments)    Ivp Dye [Fd And C Blue No.1] Hives     SWELLING    Metrizamide Hives    Norvasc [Amlodipine] Other (comments)     Other reaction(s):  Other (comments)  ELEVATED BP  ELEVATED BP    Novocain [Procaine] Unknown (comments)     Other reaction(s): Unknown (comments)    Sectral [Acebutolol] Hives and Swelling    Sulfa (Sulfonamide Antibiotics) Hives    Sulfasalazine Hives    Talwin [Pentazocine Lactate] Swelling    Tetanus Vaccines And Toxoid Shortness of Breath    Vasotec [Enalapril Maleate] Other (comments)     ELEVATED BP             SOCIAL HISTORY:     Social History   Substance Use Topics    Smoking status: Never Smoker    Smokeless tobacco: Never Used    Alcohol use No           FAMILY HISTORY:     Family History   Problem Relation Age of Onset    Cancer Mother      LUNG    Heart Disease Father 46    Coronary Artery Disease Father     Dementia Sister     Heart Disease Brother     Cancer Brother      COLON            REVIEW OF SYSTEMS:         Review of Systems:    Constitutional: Negative for fever, chills    HEENT: Negative for vision changes.    Respiratory: no cough    Cardiovascular: Negative for orthopnea, syncope, and PND.    Gastrointestinal: Negative for abdominal pain,or melena  + constipation   Genitourinary: Negative for dysuria    Musculoskeletal: Negative for myalgias.  + knee pain   Skin: Negative for rash    Heme: No problems bleeding.    Neurological: Negative for speech change and focal weakness.    + restless leg syndrome    + Urinary frequency              PHYSICAL EXAM:         Physical Exam:       Visit Vitals    /82 (BP 1 Location: Right arm, BP Patient Position: Sitting)    Pulse (!) 57    Resp 18    Ht 5' 2\" (1.575 m)    Wt 163 lb 9.6 oz (74.2 kg)    SpO2 95%    BMI 29.92 kg/m2       Patient appears generally well, mood and affect are appropriate and pleasant.    HEENT: Normocephalic, atraumatic. Ching Andre anicteric.  Hearing intact.    Neck Exam: Supple, no bruits    Lung Exam: Clear to auscultation, even breath sounds.    Cardiac Exam: Regular rate and rhythm with 1/6 systolic murmur    Abdomen: Soft, non-tender, normal bowel sounds.    Extremities: No lower extremity edema.    Vasc - 2+ DP's  Psych - appropriate affect    Neuro - non focal           LABS / OTHER STUDIES:         Lab Results   Component Value Date/Time    Sodium 142 10/25/2017 07:47 AM    Potassium 4.0 10/25/2017 07:47 AM    Chloride 105 10/25/2017 07:47 AM    CO2 24 10/25/2017 07:47 AM    Anion gap 9 04/14/2013 04:20 AM    Glucose 98 10/25/2017 07:47 AM    BUN 10 10/25/2017 07:47 AM    Creatinine 0.83 10/25/2017 07:47 AM    BUN/Creatinine ratio 12 10/25/2017 07:47 AM    GFR est AA 79 10/25/2017 07:47 AM    GFR est non-AA 68 10/25/2017 07:47 AM    Calcium 9.5 10/25/2017 07:47 AM    Bilirubin, total 0.5 10/25/2017 07:47 AM    AST (SGOT) 21 10/25/2017 07:47 AM    Alk.  phosphatase 75 10/25/2017 07:47 AM    Protein, total 6.7 10/25/2017 07:47 AM    Albumin 4.2 10/25/2017 07:47 AM    Globulin 3.5 04/09/2013 08:08 PM    A-G Ratio 1.7 10/25/2017 07:47 AM    ALT (SGPT) 12 10/25/2017 07:47 AM     Lab Results   Component Value Date/Time    WBC 6.6 04/14/2013 04:20 AM    HGB 9.9 04/14/2013 04:20 AM    HCT 29.5 04/14/2013 04:20 AM    PLATELET 485 84/25/3335 04:20 AM    MCV 87.5 04/14/2013 04:20 AM       Lab Results   Component Value Date/Time    Cholesterol, total 155 10/25/2017 07:47 AM    HDL Cholesterol 37 10/25/2017 07:47 AM    LDL, calculated 91 10/25/2017 07:47 AM    VLDL, calculated 27 10/25/2017 07:47 AM    Triglyceride 133 10/25/2017 07:47 AM                      CARDIAC DIAGNOSTICS:        EKG 1/7/16 - sinus bradycardia, normal   EKG 1/19/17 - sinus marylin, otherwise normal       Carotid Duplex 7/14/16 - 0-9% RITA and 47-01% LICA  Carotid Dopplers 5/18/17 - 10-49% stenosis bilat   Exercise Cardiolite 5/18/17 - walked 4:19 (7.0 METS) - normal stress EKG and MPI. LVEF 67%  Echo 5/18/17 - LVEF 60%, grade 1 diastology. RV normal.  Mild MR.  RVSP 37              ASSESSMENT AND PLAN:         Assessment and Plan:    1) HTN    - She had problems with lightheadedness which is better now. Symptoms have been suggestive of orthostatic hypotension. She has supine HTN. Previously symptoms improved somewhat after cutting back Hyzaar. Due to risk of orthostasis with Hyzaar, I stopped that. I then tried labetalol, but due to vague complaints with labetalol I had to stop that. Due to leg pains, we stopped Norvasc.  Due to dry mouth, she couldn't tolerate even 0.1 QHS of clonidine.    - She is tolerating Bystolic 20 mg daily and HCTZ 25 mg daily.   - Her dizziness is better lately   - Due to high BP ( at home she says), will try amlodipine 2.5 mg daily (she will follow BP at home and call us if high)      2) Mild carotid artery disease   - she is on a statin and ASA   - recheck carotids       3) History HYLTON and atypical chest pain    - lexiscan cardiolite and echo were normal 5/17       4) Dyslipidemia    - we stopped atorvastatin and zeta given muscle complaints - those symptoms are better now    - I increased her pravastatin to 40 mg and she did OK at first.  But then she had some shoulder pain. Reducing the dose helped improve those symptoms.   - She is tolerating pravastatin at 20 mg   - LDL-P on 7/8/16 is high and we added Zetia at that time - but due to cost she did not take it.   - On 1/17, LDL remains high. Switched from pravastatin to Crestor 10 mg daily. - 4/17 and 10/17 labs look good. She is tolerating Crestor.    - work on eating healthier       5) Preop Evaluation (when she is ready for surgery)   - Ms. Jared Barillas may have knee surgery in future  (Dr. Mikael Bernal)  - She is active (yardwork) without problems  - She can proceed with knee surgery and should have low CV risk (as long testing above OK).   She can hold her ASA a week prior to surgery if necessary.       6) RTC in 6 months.  Patient expressed understanding of the plan - questions were answered.       Kids (she had 7) and grandkids are in the area.             Audra Frost MD, [address] [facility]  [address] [facility] [address]  [address], [facility] [address]  Ph: [phone] Ph [phone]

## 2017-10-31 NOTE — MR AVS SNAPSHOT
Visit Information Date & Time Provider Department Dept. Phone Encounter #  
 10/31/2017  9:20 AM Melinda Junior MD [facility] [place] [phone] [id number] Upcoming Health Maintenance Date Due DTaP/Tdap/Td series (1 - Tdap) 8/19/1961 ZOSTER VACCINE AGE 60> 6/19/2000 GLAUCOMA SCREENING Q2Y 8/19/2005 OSTEOPOROSIS SCREENING (DEXA) 8/19/2005 MEDICARE YEARLY EXAM 8/19/2005 Pneumococcal 65+ Low/Medium Risk (2 of 2 - PPSV23) 1/15/2011 INFLUENZA AGE 9 TO ADULT 8/1/2017 Allergies as of 10/31/2017  Review Complete On: 10/31/2017 By: Melinda Junior MD  
  
 Severity Noted Reaction Type Reactions Azithromycin High 07/14/2016    Shortness of Breath Heart slows down Heart slows down Tetanus Toxoid, Adsorbed High 11/11/2010    Shortness of Breath Adhesive Tape-silicones  31/07/5998    Rash Codeine  11/11/2010    Hives N/V 
N/V  
 Dilaudid [Hydromorphone (Bulk)]  03/08/2012    Nausea and Vomiting Dilaudid [Hydromorphone]  05/29/2012    Unknown (comments) Other reaction(s): Unknown (comments) Ivp Dye [Fd And C Blue No.1]  11/11/2010    Hives SWELLING Metrizamide  07/24/2017    Hives Norvasc [Amlodipine]  03/08/2012    Other (comments) Other reaction(s): Other (comments) ELEVATED BP 
ELEVATED BP Novocain [Procaine]  04/10/2013   Not Verified Unknown (comments) Other reaction(s): Unknown (comments) Sectral [Acebutolol]  11/11/2010   Side Effect Hives, Swelling Sulfa (Sulfonamide Antibiotics)  03/08/2012    Hives Sulfasalazine  03/08/2012    Hives Talwin [Pentazocine Lactate]  11/11/2010    Swelling Tetanus Vaccines And Toxoid  11/11/2010    Shortness of Breath Vasotec [Enalapril Maleate]  11/11/2010    Other (comments) ELEVATED BP Current Immunizations  Reviewed on 4/9/2013 Name Date Influenza Vaccine 10/1/2012 Pneumococcal Vaccine (Unspecified Type) 1/15/2006 Not reviewed this visit You Were Diagnosed With   
  
 Codes Comments Essential hypertension     ICD-10-CM: I10 
ICD-9-CM: 401.9 Hypercholesterolemia     ICD-10-CM: E78.00 ICD-9-CM: 272.0 Bilateral carotid artery disease ([facility] [address]     ICD-10-CM: I77.9 ICD-9-CM: 083. 9 Vitals BP Pulse Resp Height(growth percentile) Weight(growth percentile) SpO2  
 162/82 (BP 1 Location: Right arm, BP Patient Position: Sitting) (!) 57 18 5' 2\" (1.575 m) 163 lb 9.6 oz (74.2 kg) 95% BMI OB Status Smoking Status 29.92 kg/m2 Hysterectomy Never Smoker Vitals History BMI and BSA Data Body Mass Index Body Surface Area  
 29.92 kg/m 2 1.8 m 2 Preferred Pharmacy Pharmacy Name Phone [address] [address] [phone] Your Updated Medication List  
  
   
This list is accurate as of: 10/31/17  9:24 AM.  Always use your most recent med list. amLODIPine 2.5 mg tablet Commonly known as:  Griffith Fanti Take 1 Tab by mouth daily. aspirin delayed-release 81 mg tablet Take  by mouth daily. BYSTOLIC 20 mg tablet Generic drug:  nebivolol TAKE ONE TABLET BY MOUTH EVERY DAY  
  
 calcium-cholecalciferol (D3) tablet Commonly known as:  CALTRATE 600+D Take 1 Tab by mouth daily. clindamycin 150 mg capsule Commonly known as:  CLEOCIN  
  
 hydroCHLOROthiazide 25 mg tablet Commonly known as:  HYDRODIURIL Take 1 Tab by mouth daily. MULTIPLE VITAMINS PO Take  by mouth daily. * PROVENTIL HFA IN Take  by inhalation as needed. 108mcg/act PRN  
  
 * albuterol 90 mcg/actuation inhaler Commonly known as:  PROVENTIL HFA, VENTOLIN HFA, PROAIR HFA Take  by inhalation. rosuvastatin 10 mg tablet Commonly known as:  CRESTOR Take 1 Tab by mouth nightly. traZODone 50 mg tablet Commonly known as:  DESYREL  
  
 VITAMIN B-12 1,000 mcg/mL injection Generic drug:  cyanocobalamin 1,000 mcg by IntraMUSCular route once. MONTHLY * Notice: This list has 2 medication(s) that are the same as other medications prescribed for you. Read the directions carefully, and ask your doctor or other care provider to review them with you. Prescriptions Printed Refills  
 amLODIPine (NORVASC) 2.5 mg tablet 3 Sig: Take 1 Tab by mouth daily. Class: Print Route: Oral  
 hydroCHLOROthiazide (HYDRODIURIL) 25 mg tablet 3 Sig: Take 1 Tab by mouth daily. Class: Print Route: Oral  
  
We Performed the Following CBC W/O DIFF [82616 CPT(R)] LIPID PANEL [18223 CPT(R)] METABOLIC PANEL, COMPREHENSIVE [99240 CPT(R)] Introducing \[facility]\"" & [facility]! Dear Ansley Min: 
Thank you for requesting a [employer] account. Our records indicate that you already have an active [employer] account. You can access your account anytime at [email or web address]. [email or web address] Did you know that you can access your hospital and ER discharge instructions at any time in [employer]? You can also review all of your test results from your hospital stay or ER visit. Additional Information If you have questions, please visit the Frequently Asked Questions section of the [employer] website at [email or web address] Remember, [employer] is NOT to be used for urgent needs. For medical emergencies, dial 911. Now available from your iPhone and Android! Please provide this summary of care documentation to your next provider. Your primary care clinician is listed as Tiffany Arrington. If you have any questions after today's visit, please call [phone].

## 2017-10-31 NOTE — PROGRESS NOTES
Chief Complaint   Patient presents with    Coronary Artery Disease     1. Have you been to the ER, urgent care clinic since your last visit? Hospitalized since your last visit? No    2. Have you seen or consulted any other health care providers outside of the [address] [place] [address] since your last visit? Include any pap smears or colon screening. Yes, Eye Doctor in September 2017, Pulmonary, June 2017 and PCP October 3, 2017. Blood pressure 162/82, pulse (!) 57, resp. rate 18, height 5' 2\" (1.575 m), weight 163 lb 9.6 oz (74.2 kg), SpO2 95 %.

## 2017-11-13 DIAGNOSIS — I10 ESSENTIAL HYPERTENSION: ICD-10-CM

## 2017-11-13 DIAGNOSIS — I77.9 BILATERAL CAROTID ARTERY DISEASE (HCC): ICD-10-CM

## 2017-11-13 DIAGNOSIS — E78.00 HYPERCHOLESTEROLEMIA: ICD-10-CM

## 2017-11-13 NOTE — TELEPHONE ENCOUNTER
Refills are per verbal order of Dr. Zach Terry. Requested Prescriptions     Pending Prescriptions Disp Refills    rosuvastatin (CRESTOR) 10 mg tablet 90 Tab 1     Sig: Take 1 Tab by mouth nightly.  hydroCHLOROthiazide (HYDRODIURIL) 25 mg tablet 90 Tab 1     Sig: Take 1 Tab by mouth daily.  amLODIPine (NORVASC) 2.5 mg tablet 90 Tab 1     Sig: Take 1 Tab by mouth daily.

## 2017-11-15 DIAGNOSIS — I77.9 BILATERAL CAROTID ARTERY DISEASE (HCC): ICD-10-CM

## 2017-11-15 DIAGNOSIS — E78.00 HYPERCHOLESTEROLEMIA: ICD-10-CM

## 2017-11-15 DIAGNOSIS — I10 ESSENTIAL HYPERTENSION: ICD-10-CM

## 2017-11-15 RX ORDER — ROSUVASTATIN CALCIUM 10 MG/1
10 TABLET, COATED ORAL
Qty: 90 TAB | Refills: 1 | Status: SHIPPED | OUTPATIENT
Start: 2017-11-15 | End: 2017-11-16 | Stop reason: SDUPTHER

## 2017-11-15 RX ORDER — AMLODIPINE BESYLATE 2.5 MG/1
2.5 TABLET ORAL DAILY
Qty: 90 TAB | Refills: 1 | Status: SHIPPED | OUTPATIENT
Start: 2017-11-15 | End: 2017-11-16 | Stop reason: SDUPTHER

## 2017-11-15 RX ORDER — HYDROCHLOROTHIAZIDE 25 MG/1
25 TABLET ORAL DAILY
Qty: 90 TAB | Refills: 1 | Status: SHIPPED | OUTPATIENT
Start: 2017-11-15 | End: 2017-11-16 | Stop reason: SDUPTHER

## 2017-11-15 RX ORDER — AMLODIPINE BESYLATE 2.5 MG/1
2.5 TABLET ORAL DAILY
Qty: 90 TAB | Refills: 1 | Status: SHIPPED | OUTPATIENT
Start: 2017-11-15 | End: 2017-11-15 | Stop reason: SDUPTHER

## 2017-11-16 DIAGNOSIS — E78.00 HYPERCHOLESTEROLEMIA: ICD-10-CM

## 2017-11-16 DIAGNOSIS — I10 ESSENTIAL HYPERTENSION: ICD-10-CM

## 2017-11-16 DIAGNOSIS — I77.9 BILATERAL CAROTID ARTERY DISEASE (HCC): ICD-10-CM

## 2017-11-16 RX ORDER — HYDROCHLOROTHIAZIDE 25 MG/1
25 TABLET ORAL DAILY
Qty: 90 TAB | Refills: 1 | Status: SHIPPED | OUTPATIENT
Start: 2017-11-16 | End: 2018-07-18 | Stop reason: SDUPTHER

## 2017-11-16 RX ORDER — ROSUVASTATIN CALCIUM 10 MG/1
10 TABLET, COATED ORAL
Qty: 90 TAB | Refills: 1 | Status: SHIPPED | OUTPATIENT
Start: 2017-11-16 | End: 2018-07-18 | Stop reason: SDUPTHER

## 2017-11-16 RX ORDER — AMLODIPINE BESYLATE 2.5 MG/1
2.5 TABLET ORAL DAILY
Qty: 90 TAB | Refills: 1 | Status: SHIPPED | OUTPATIENT
Start: 2017-11-16 | End: 2018-04-03 | Stop reason: SDUPTHER

## 2017-11-16 NOTE — TELEPHONE ENCOUNTER
Requested Prescriptions     Pending Prescriptions Disp Refills    amLODIPine (NORVASC) 2.5 mg tablet 90 Tab 1     Sig: Take 1 Tab by mouth daily.  hydroCHLOROthiazide (HYDRODIURIL) 25 mg tablet 90 Tab 1     Sig: Take 1 Tab by mouth daily.  rosuvastatin (CRESTOR) 10 mg tablet 90 Tab 1     Sig: Take 1 Tab by mouth nightly.      Last OV 10/31/17  Next OV 5/17/18    Pharmacy verified  90 day supply    Thank you, AP

## 2018-04-27 ENCOUNTER — HOSPITAL ENCOUNTER (OUTPATIENT)
Dept: LAB | Age: 78
Discharge: HOME | End: 2018-04-27
Payer: MEDICARE

## 2018-04-27 PROCEDURE — 80053 COMPREHEN METABOLIC PANEL: CPT

## 2018-04-27 PROCEDURE — 36415 COLL VENOUS BLD VENIPUNCTURE: CPT

## 2018-04-27 PROCEDURE — 80061 LIPID PANEL: CPT

## 2018-04-27 PROCEDURE — 85027 COMPLETE CBC AUTOMATED: CPT

## 2018-04-28 LAB
ALBUMIN SERPL-MCNC: 4.2 G/DL (ref 3.5–4.8)
ALBUMIN/GLOB SERPL: 1.6 {RATIO} (ref 1.2–2.2)
ALP SERPL-CCNC: 65 IU/L (ref 39–117)
ALT SERPL-CCNC: 11 IU/L (ref 0–32)
AST SERPL-CCNC: 20 IU/L (ref 0–40)
BILIRUB SERPL-MCNC: 0.5 MG/DL (ref 0–1.2)
BUN SERPL-MCNC: 11 MG/DL (ref 8–27)
BUN/CREAT SERPL: 13 (ref 12–28)
CALCIUM SERPL-MCNC: 9.1 MG/DL (ref 8.7–10.3)
CHLORIDE SERPL-SCNC: 101 MMOL/L (ref 96–106)
CHOLEST SERPL-MCNC: 147 MG/DL (ref 100–199)
CO2 SERPL-SCNC: 23 MMOL/L (ref 18–29)
CREAT SERPL-MCNC: 0.83 MG/DL (ref 0.57–1)
ERYTHROCYTE [DISTWIDTH] IN BLOOD BY AUTOMATED COUNT: 13.7 % (ref 12.3–15.4)
GFR SERPLBLD CREATININE-BSD FMLA CKD-EPI: 68 ML/MIN/1.73
GFR SERPLBLD CREATININE-BSD FMLA CKD-EPI: 79 ML/MIN/1.73
GLOBULIN SER CALC-MCNC: 2.6 G/DL (ref 1.5–4.5)
GLUCOSE SERPL-MCNC: 100 MG/DL (ref 65–99)
HCT VFR BLD AUTO: 36 % (ref 34–46.6)
HDLC SERPL-MCNC: 35 MG/DL
HGB BLD-MCNC: 11.9 G/DL (ref 11.1–15.9)
INTERPRETATION, 910389: NORMAL
LDLC SERPL CALC-MCNC: 90 MG/DL (ref 0–99)
MCH RBC QN AUTO: 29.8 PG (ref 26.6–33)
MCHC RBC AUTO-ENTMCNC: 33.1 G/DL (ref 31.5–35.7)
MCV RBC AUTO: 90 FL (ref 79–97)
PLATELET # BLD AUTO: 226 X10E3/UL (ref 150–379)
POTASSIUM SERPL-SCNC: 3.9 MMOL/L (ref 3.5–5.2)
PROT SERPL-MCNC: 6.8 G/DL (ref 6–8.5)
RBC # BLD AUTO: 4 X10E6/UL (ref 3.77–5.28)
SODIUM SERPL-SCNC: 141 MMOL/L (ref 134–144)
TRIGL SERPL-MCNC: 110 MG/DL (ref 0–149)
VLDLC SERPL CALC-MCNC: 22 MG/DL (ref 5–40)
WBC # BLD AUTO: 5.1 X10E3/UL (ref 3.4–10.8)

## 2018-05-02 ENCOUNTER — HOSPITAL ENCOUNTER (OUTPATIENT)
Dept: PREADMISSION TESTING | Age: 78
Discharge: HOME | End: 2018-05-02
Payer: MEDICARE

## 2018-05-02 VITALS
DIASTOLIC BLOOD PRESSURE: 65 MMHG | HEIGHT: 62 IN | HEART RATE: 59 BPM | TEMPERATURE: 97.7 F | BODY MASS INDEX: 29.81 KG/M2 | WEIGHT: 162 LBS | SYSTOLIC BLOOD PRESSURE: 155 MMHG

## 2018-05-02 LAB
ABO + RH BLD: NORMAL
APPEARANCE UR: CLEAR
BACTERIA URNS QL MICRO: NEGATIVE /HPF
BILIRUB UR QL: NEGATIVE
BLOOD GROUP ANTIBODIES SERPL: NORMAL
COLOR UR: NORMAL
EPITH CASTS URNS QL MICRO: NORMAL /LPF
EST. AVERAGE GLUCOSE BLD GHB EST-MCNC: 100 MG/DL
GLUCOSE UR STRIP.AUTO-MCNC: NEGATIVE MG/DL
HBA1C MFR BLD: 5.1 % (ref 4.2–6.3)
HGB UR QL STRIP: NEGATIVE
HYALINE CASTS URNS QL MICRO: NORMAL /LPF (ref 0–5)
INR PPP: 1.1 (ref 0.9–1.1)
KETONES UR QL STRIP.AUTO: NEGATIVE MG/DL
LEUKOCYTE ESTERASE UR QL STRIP.AUTO: NEGATIVE
NITRITE UR QL STRIP.AUTO: NEGATIVE
PH UR STRIP: 6 [PH] (ref 5–8)
PROT UR STRIP-MCNC: NEGATIVE MG/DL
PROTHROMBIN TIME: 11.1 SEC (ref 9–11.1)
RBC #/AREA URNS HPF: NORMAL /HPF (ref 0–5)
SP GR UR REFRACTOMETRY: 1.01 (ref 1–1.03)
SPECIMEN EXP DATE BLD: NORMAL
UA: UC IF INDICATED,UAUC: NORMAL
UROBILINOGEN UR QL STRIP.AUTO: 0.2 EU/DL (ref 0.2–1)
WBC URNS QL MICRO: NORMAL /HPF (ref 0–4)

## 2018-05-02 PROCEDURE — 81001 URINALYSIS AUTO W/SCOPE: CPT | Performed by: ORTHOPAEDIC SURGERY

## 2018-05-02 PROCEDURE — 85610 PROTHROMBIN TIME: CPT | Performed by: ORTHOPAEDIC SURGERY

## 2018-05-02 PROCEDURE — 86900 BLOOD TYPING SEROLOGIC ABO: CPT | Performed by: ORTHOPAEDIC SURGERY

## 2018-05-02 PROCEDURE — 83036 HEMOGLOBIN GLYCOSYLATED A1C: CPT | Performed by: ORTHOPAEDIC SURGERY

## 2018-05-02 RX ORDER — POLYETHYLENE GLYCOL 3350 17 G/17G
17 POWDER, FOR SOLUTION ORAL AS NEEDED
COMMUNITY
End: 2019-09-20

## 2018-05-02 RX ORDER — ACETAMINOPHEN 325 MG/1
500 TABLET ORAL
COMMUNITY
End: 2018-05-17

## 2018-05-02 RX ORDER — IBUPROFEN 200 MG
200 TABLET ORAL
COMMUNITY
End: 2018-06-10

## 2018-05-03 ENCOUNTER — OFFICE VISIT (OUTPATIENT)
Dept: CARDIOLOGY CLINIC | Age: 78
End: 2018-05-03

## 2018-05-03 VITALS
OXYGEN SATURATION: 96 % | SYSTOLIC BLOOD PRESSURE: 158 MMHG | WEIGHT: 161 LBS | BODY MASS INDEX: 29.45 KG/M2 | DIASTOLIC BLOOD PRESSURE: 72 MMHG | HEART RATE: 55 BPM

## 2018-05-03 DIAGNOSIS — Z01.810 PREOP CARDIOVASCULAR EXAM: ICD-10-CM

## 2018-05-03 DIAGNOSIS — E78.00 HYPERCHOLESTEROLEMIA: ICD-10-CM

## 2018-05-03 DIAGNOSIS — I10 ESSENTIAL HYPERTENSION: Primary | ICD-10-CM

## 2018-05-03 LAB
BACTERIA SPEC CULT: NORMAL
BACTERIA SPEC CULT: NORMAL
SERVICE CMNT-IMP: NORMAL

## 2018-05-03 RX ORDER — LORATADINE 10 MG/1
10 TABLET ORAL
COMMUNITY
End: 2022-09-06

## 2018-05-03 RX ORDER — AMLODIPINE BESYLATE 5 MG/1
5 TABLET ORAL DAILY
Qty: 90 TAB | Refills: 3 | Status: SHIPPED | OUTPATIENT
Start: 2018-05-03 | End: 2019-07-11 | Stop reason: SDUPTHER

## 2018-05-03 RX ORDER — NEBIVOLOL 20 MG/1
20 TABLET ORAL DAILY
Qty: 30 TAB | Refills: 12 | Status: SHIPPED | OUTPATIENT
Start: 2018-05-03 | End: 2018-09-25 | Stop reason: SDUPTHER

## 2018-05-03 NOTE — MR AVS SNAPSHOT
[address] [facility] 600 1007 [facility] 
[phone] Patient: Regina Silverado MRN: [id number] MCE:[id number] Visit Information Date & Time Provider Department Dept. Phone Encounter #  
 5/3/2018 10:40 AM Del Brown MD [facility] Ela Ceja [phone] [id number] Your Appointments 9/25/2018 11:40 AM  
ESTABLISHED PATIENT with Del Brown MD  
[facility] ([place] SCHEDULING) Appt Note: 4 mo fu  
 [address] [facility] 600 1007 [facility]  
[address] [facility] [place] [address] [address] Upcoming Health Maintenance Date Due DTaP/Tdap/Td series (1 - Tdap) 8/19/1961 ZOSTER VACCINE AGE 60> 6/19/2000 GLAUCOMA SCREENING Q2Y 8/19/2005 Bone Densitometry (Dexa) Screening 8/19/2005 Pneumococcal 65+ Low/Medium Risk (2 of 2 - PPSV23) 1/15/2011 MEDICARE YEARLY EXAM 3/14/2018 Influenza Age 5 to Adult 8/1/2018 Allergies as of 5/3/2018  Review Complete On: 5/3/2018 By: Del Brown MD  
  
 Severity Noted Reaction Type Reactions Azithromycin High 07/14/2016    Shortness of Breath Heart slows down Heart slows down Tetanus Toxoid, Adsorbed High 11/11/2010    Shortness of Breath Adhesive Tape-silicones  32/61/0370    Rash Codeine  11/11/2010    Hives N/V 
N/V  
 Dilaudid [Hydromorphone (Bulk)]  03/08/2012    Nausea and Vomiting Dilaudid [Hydromorphone]  05/29/2012    Unknown (comments) Other reaction(s): Unknown (comments) Ivp Dye [Fd And C Blue No.1]  11/11/2010    Hives SWELLING Metrizamide  07/24/2017    Hives Novocain [Procaine]  04/10/2013   Not Verified Unknown (comments) Other reaction(s): Unknown (comments) Sectral [Acebutolol]  11/11/2010   Side Effect Hives, Swelling Sulfa (Sulfonamide Antibiotics)  03/08/2012    Nausea and Vomiting And hives Sulfasalazine  03/08/2012    Hives Talwin [Pentazocine Lactate]  11/11/2010    Swelling Tetanus Vaccines And Toxoid  11/11/2010    Shortness of Breath Vasotec [Enalapril Maleate]  11/11/2010    Other (comments) ELEVATED BP Current Immunizations  Reviewed on 4/9/2013 Name Date Influenza Vaccine 10/1/2012 Pneumococcal Vaccine (Unspecified Type) 1/15/2006 Not reviewed this visit You Were Diagnosed With   
  
 Codes Comments Essential hypertension    -  Primary ICD-10-CM: I10 
ICD-9-CM: 401.9 Hypercholesterolemia     ICD-10-CM: E78.00 ICD-9-CM: 272.0 Preop cardiovascular exam     ICD-10-CM: Z01.810 ICD-9-CM: V72.81 Vitals BP Pulse Weight(growth percentile) SpO2 BMI OB Status 158/72 (BP 1 Location: Left arm, BP Patient Position: Sitting) (!) 55 161 lb (73 kg) 96% 29.45 kg/m2 Hysterectomy Smoking Status Never Smoker Vitals History BMI and BSA Data Body Mass Index Body Surface Area  
 29.45 kg/m 2 1.79 m 2 Preferred Pharmacy Pharmacy Name Phone [address] [address] [phone] Your Updated Medication List  
  
   
This list is accurate as of 5/3/18 11:00 AM.  Always use your most recent med list. ADVIL 200 mg tablet Generic drug:  ibuprofen Take 200 mg by mouth every six (6) hours as needed for Pain. albuterol 90 mcg/actuation inhaler Commonly known as:  PROVENTIL HFA, VENTOLIN HFA, PROAIR HFA Take 2 Puffs by inhalation as needed. amLODIPine 5 mg tablet Commonly known as:  Jacquetta Couch Take 1 Tab by mouth daily. aspirin delayed-release 81 mg tablet Take  by mouth daily. CLARITIN 10 mg tablet Generic drug:  loratadine Take 10 mg by mouth daily. hydroCHLOROthiazide 25 mg tablet Commonly known as:  HYDRODIURIL Take 1 Tab by mouth daily. MIRALAX 17 gram packet Generic drug:  polyethylene glycol Take 17 g by mouth daily. MULTIPLE VITAMINS PO Take  by mouth daily. nebivolol 20 mg tablet Commonly known as:  BYSTOLIC Take 1 Tab by mouth daily. rosuvastatin 10 mg tablet Commonly known as:  CRESTOR Take 1 Tab by mouth nightly. TYLENOL 325 mg tablet Generic drug:  acetaminophen Take 500 mg by mouth every four (4) hours as needed for Pain. VITAMIN B-12 1,000 mcg/mL injection Generic drug:  cyanocobalamin  
1,000 mcg by IntraMUSCular route once. MONTHLY, APPROX 3/2018 Prescriptions Sent to Pharmacy Refills  
 amLODIPine (NORVASC) 5 mg tablet 3 Sig: Take 1 Tab by mouth daily. Class: Normal  
 Pharmacy: [address] [facility], [address] [place] [address] Ph #: [phone] Route: Oral  
 nebivolol (BYSTOLIC) 20 mg tablet 12 Sig: Take 1 Tab by mouth daily. Class: Normal  
 Pharmacy: [id number] [facility] 18, [address] [address] Ph #: [phone] Route: Oral  
  
We Performed the Following AMB POC EKG ROUTINE W/ 12 LEADS, INTER & REP [81159 CPT(R)] Introducing [facility]! Dear Angela Maza: 
Thank you for requesting a [employer] account. Our records indicate that you already have an active [employer] account. You can access your account anytime at [email or web address] Did you know that you can access your hospital and ER discharge instructions at any time in [employer]? You can also review all of your test results from your hospital stay or ER visit. Additional Information If you have questions, please visit the Frequently Asked Questions section of the [employer] website at [email or web address] Remember, [employer] is NOT to be used for urgent needs. For medical emergencies, dial 911. Now available from your iPhone and Android! Please provide this summary of care documentation to your next provider. Your primary care clinician is listed as Damaris Scott. If you have any questions after today's visit, please call [phone].

## 2018-05-03 NOTE — PROGRESS NOTES
Yoli Baker MD. [facility]              Patient: Aracelis Amaya  :       Today's Date: 5/3/2018            HISTORY OF PRESENT ILLNESS:     History of Present Illness:  Ms. Jagruti Marin is here for follow-up. No complaints. No CP or SOB. Some dizziness when getting up from lying position or after stooping down - passes quickly. No syncope. BP mildly high at home. PAST MEDICAL HISTORY:     Past Medical History:   Diagnosis Date    Arthritis     Asthma     albuterol    CAD (coronary artery disease)     Elevated glucose     borderline elevated A1c    GERD (gastroesophageal reflux disease)     Hypercholesterolemia     Hypertension     Nausea & vomiting     MALINI on CPAP        Past Surgical History:   Procedure Laterality Date    ECHO STRESS      normal resting LV wall motion and no evidence of ischemia. The ejection fraction was 50-60%.  HOLTER MONITOR      frequent ventricular extrasystoles. Rare couplets and triplets. NSR during diary entries.  HX HYSTERECTOMY      HX ORTHOPAEDIC      Right ROTATER CUFF SURGERY     HX ORTHOPAEDIC      R KNEE MENISCUS    HX ORTHOPAEDIC  2013    left tibia repair    HX ORTHOPAEDIC  2012    LEFT ROTATER CUFF REPAIR     HX ORTHOPAEDIC  2009    THUMB TRIGGER FINGER RELEASE    HX OTHER SURGICAL      CARPAL TUNNEL BILATERAL, TRIGGER THUMB RELEASE    HX OTHER SURGICAL      Carotid dopplers - mild disease bilat    HX OTHER SURGICAL      Lexiscan Cardiolite 12 - normal; LVEF 65%    HX OTHER SURGICAL      Echo 12 - TDS, LVEF 60%, no sig valve disease, RVSP 25    HX OTHER SURGICAL      Carotid Duplex 3/14/13 - 10-49% stenosis bilat     HX OTHER SURGICAL      LE ZE's 3/14/13 - normal ZE's            MEDICATIONS:     Current Outpatient Prescriptions   Medication Sig Dispense Refill    loratadine (CLARITIN) 10 mg tablet Take 10 mg by mouth daily.       ibuprofen (ADVIL) 200 mg tablet Take 200 mg by mouth every six (6) hours as needed for Pain.  acetaminophen (TYLENOL) 325 mg tablet Take 500 mg by mouth every four (4) hours as needed for Pain.  polyethylene glycol (MIRALAX) 17 gram packet Take 17 g by mouth daily.  amLODIPine (NORVASC) 2.5 mg tablet TAKE 1 TABLET EVERY DAY 90 Tab 0    hydroCHLOROthiazide (HYDRODIURIL) 25 mg tablet Take 1 Tab by mouth daily. 90 Tab 1    rosuvastatin (CRESTOR) 10 mg tablet Take 1 Tab by mouth nightly. 90 Tab 1    albuterol (PROVENTIL HFA, VENTOLIN HFA, PROAIR HFA) 90 mcg/actuation inhaler Take 2 Puffs by inhalation as needed.  BYSTOLIC 20 mg tablet TAKE ONE TABLET BY MOUTH EVERY DAY 30 Tab 11    aspirin delayed-release 81 mg tablet Take  by mouth daily.  cyanocobalamin (VITAMIN B-12) 1,000 mcg/mL injection 1,000 mcg by IntraMUSCular route once. MONTHLY, APPROX 3/2018      MULTIVITAMIN (MULTIPLE VITAMINS PO) Take  by mouth daily.          Allergies   Allergen Reactions    Azithromycin Shortness of Breath     Heart slows down  Heart slows down    Tetanus Toxoid, Adsorbed Shortness of Breath    Adhesive Tape-Silicones Rash    Codeine Hives     N/V  N/V    Dilaudid [Hydromorphone (Bulk)] Nausea and Vomiting    Dilaudid [Hydromorphone] Unknown (comments)     Other reaction(s): Unknown (comments)    Ivp Dye [Fd And C Blue No.1] Hives     SWELLING    Metrizamide Hives    Novocain [Procaine] Unknown (comments)     Other reaction(s): Unknown (comments)    Sectral [Acebutolol] Hives and Swelling    Sulfa (Sulfonamide Antibiotics) Nausea and Vomiting     And hives    Sulfasalazine Hives    Talwin [Pentazocine Lactate] Swelling    Tetanus Vaccines And Toxoid Shortness of Breath    Vasotec [Enalapril Maleate] Other (comments)     ELEVATED BP             SOCIAL HISTORY:     Social History   Substance Use Topics    Smoking status: Never Smoker    Smokeless tobacco: Never Used    Alcohol use No         FAMILY HISTORY:     Family History   Problem Relation Age of Onset    Cancer Mother      LUNG AND CERVICAL CANCER    Heart Disease Father 46    Coronary Artery Disease Father     Heart Attack Father     Dementia Sister     Heart Disease Brother     Cancer Brother      COLON    Cancer Brother      THROAT CANCER    Diabetes Brother     Lung Disease Brother     Sleep Apnea Brother     Anesth Problems Neg Hx             REVIEW OF SYSTEMS:         Review of Systems:    Constitutional: Negative for fever, chills    HEENT: Negative for vision changes.    Respiratory: no cough    Cardiovascular: Negative for orthopnea, syncope, and PND.    Gastrointestinal: Negative for abdominal pain,or melena  + constipation   Genitourinary: Negative for dysuria    Musculoskeletal: Negative for myalgias.  + knee pain   Skin: Negative for rash    Heme: No problems bleeding.    Neurological: Negative for speech change and focal weakness.    + restless leg syndrome    + Urinary frequency              PHYSICAL EXAM:         Physical Exam:        Visit Vitals    /72 (BP 1 Location: Left arm, BP Patient Position: Sitting)    Pulse (!) 55    Wt 161 lb (73 kg)    SpO2 96%    BMI 29.45 kg/m2          Patient appears generally well, mood and affect are appropriate and pleasant.    HEENT: Normocephalic, atraumatic. Euel Riggers anicteric.  Hearing intact.    Neck Exam: Supple, no bruits    Lung Exam: Clear to auscultation, even breath sounds.    Cardiac Exam: Regular rate and rhythm with 1/6 systolic murmur    Abdomen: Soft, non-tender, normal bowel sounds.    Extremities: No lower extremity edema.    Vasc - 2+ DP's  Psych - appropriate affect    Neuro - non focal           LABS / OTHER STUDIES:         Lab Results   Component Value Date/Time    Sodium 141 04/27/2018 07:38 AM    Potassium 3.9 04/27/2018 07:38 AM    Chloride 101 04/27/2018 07:38 AM    CO2 23 04/27/2018 07:38 AM    Anion gap 9 04/14/2013 04:20 AM    Glucose 100 (H) 04/27/2018 07:38 AM    BUN 11 04/27/2018 07:38 AM    Creatinine 0.83 04/27/2018 07:38 AM    BUN/Creatinine ratio 13 04/27/2018 07:38 AM    GFR est AA 79 04/27/2018 07:38 AM    GFR est non-AA 68 04/27/2018 07:38 AM    Calcium 9.1 04/27/2018 07:38 AM    Bilirubin, total 0.5 04/27/2018 07:38 AM    AST (SGOT) 20 04/27/2018 07:38 AM    Alk. phosphatase 65 04/27/2018 07:38 AM    Protein, total 6.8 04/27/2018 07:38 AM    Albumin 4.2 04/27/2018 07:38 AM    Globulin 3.5 04/09/2013 08:08 PM    A-G Ratio 1.6 04/27/2018 07:38 AM    ALT (SGPT) 11 04/27/2018 07:38 AM       Lab Results   Component Value Date/Time    WBC 5.1 04/27/2018 07:38 AM    HGB 11.9 04/27/2018 07:38 AM    HCT 36.0 04/27/2018 07:38 AM    PLATELET 948 66/44/1679 07:38 AM    MCV 90 04/27/2018 07:38 AM       Lab Results   Component Value Date/Time    Cholesterol, total 147 04/27/2018 07:38 AM    HDL Cholesterol 35 (L) 04/27/2018 07:38 AM    LDL, calculated 90 04/27/2018 07:38 AM    VLDL, calculated 22 04/27/2018 07:38 AM    Triglyceride 110 04/27/2018 07:38 AM                   CARDIAC DIAGNOSTICS:        EKG 1/7/16 - sinus bradycardia, normal   EKG 1/19/17 - sinus marylin, otherwise normal   EKG 5/3/18 - sinus marylin, RBBB        Carotid Duplex 7/14/16 - 0-9% RITA and 15-43% LICA  Carotid Dopplers 5/18/17 - 10-49% stenosis bilat   Exercise Cardiolite 5/18/17 - walked 4:19 (7.0 METS) - normal stress EKG and MPI.  LVEF 67%  Echo 5/18/17 - LVEF 60%, grade 1 diastology.  RV normal.  Mild MR.  RVSP 37              ASSESSMENT AND PLAN:         Assessment and Plan:    1) HTN    - She had problems with lightheadedness which is better now. Symptoms have been suggestive of orthostatic hypotension. She has supine HTN. Previously symptoms improved somewhat after cutting back Hyzaar. Due to risk of orthostasis with Hyzaar, I stopped that. I then tried labetalol, but due to vague complaints with labetalol I had to stop that. Due to leg pains, we stopped Norvasc.  Due to dry mouth, she couldn't tolerate even 0.1 QHS of clonidine.    - She is tolerating Bystolic 20 mg daily and HCTZ 25 mg daily.  Added Norvasc 2.5 mg and she is tolerating that   - Her dizziness is better lately    - She is tolerating regimen right now - Will try increasing Norvasc to 5 mg.       2) Mild carotid artery disease   - she is on a statin and ASA       3) History HYLTON and atypical chest pain    - lexiscan cardiolite and echo were normal 5/17   - Doing well on 5/3/18       4) Dyslipidemia    - we stopped atorvastatin and zeta given muscle complaints - those symptoms are better now    - she is tolerating Crestor   - recent lipids look good     5) Preop Evaluation (when she is ready for surgery)   - Ms. Thania Chun may have knee surgery soon (Dr. Jaime Machuca)  - She is active (yardwork) without problems  - She can proceed with knee surgery and should have low CV risk (as long testing above OK).   She can hold her ASA a week prior to surgery if necessary.       6) RTC in 4 months.  Patient expressed understanding of the plan - questions were answered.       Kids (she had 7) and grandkids are in the area.               Porter Moss MD, [facility]  [address] [facility] 600  [address], [place]  Ph: [phone]   Ph [phone]

## 2018-05-03 NOTE — PROGRESS NOTES
Pt has no complaints/no cardiac concerns    Visit Vitals    /72 (BP 1 Location: Left arm, BP Patient Position: Sitting)    Pulse (!) 55    Wt 161 lb (73 kg)    SpO2 96%    BMI 29.45 kg/m2

## 2018-05-09 RX ORDER — DEXAMETHASONE SODIUM PHOSPHATE 10 MG/ML
4 INJECTION INTRAMUSCULAR; INTRAVENOUS ONCE
Status: CANCELLED | OUTPATIENT
Start: 2018-05-09 | End: 2018-05-10

## 2018-05-09 RX ORDER — CEFAZOLIN SODIUM/WATER 2 G/20 ML
2 SYRINGE (ML) INTRAVENOUS ONCE
Status: CANCELLED | OUTPATIENT
Start: 2018-05-09 | End: 2018-05-09

## 2018-05-09 RX ORDER — PREGABALIN 75 MG/1
75 CAPSULE ORAL ONCE
Status: CANCELLED | OUTPATIENT
Start: 2018-05-09 | End: 2018-05-09

## 2018-05-09 RX ORDER — ACETAMINOPHEN 500 MG
1000 TABLET ORAL ONCE
Status: CANCELLED | OUTPATIENT
Start: 2018-05-09 | End: 2018-05-09

## 2018-05-14 ENCOUNTER — ANESTHESIA EVENT (OUTPATIENT)
Dept: SURGERY | Age: 78
DRG: 470 | End: 2018-05-14
Payer: MEDICARE

## 2018-05-14 NOTE — ANESTHESIA PREPROCEDURE EVALUATION
Anesthetic History     PONV          Review of Systems / Medical History  Patient summary reviewed, nursing notes reviewed and pertinent labs reviewed    Pulmonary        Sleep apnea: CPAP    Asthma        Neuro/Psych   Within defined limits           Cardiovascular    Hypertension                   GI/Hepatic/Renal     GERD           Endo/Other        Arthritis     Other Findings            Physical Exam    Airway  Mallampati: II  TM Distance: > 6 cm  Neck ROM: normal range of motion   Mouth opening: Normal     Cardiovascular  Regular rate and rhythm,  S1 and S2 normal,  no murmur, click, rub, or gallop             Dental    Dentition: Lower partial plate     Pulmonary  Breath sounds clear to auscultation               Abdominal  GI exam deferred       Other Findings            Anesthetic Plan    ASA: 3  Anesthesia type: spinal      Post-op pain plan if not by surgeon: peripheral nerve block single      Anesthetic plan and risks discussed with: Patient

## 2018-05-15 ENCOUNTER — HOSPITAL ENCOUNTER (INPATIENT)
Age: 78
LOS: 2 days | Discharge: HOME HEALTH | DRG: 470 | End: 2018-05-17
Attending: ORTHOPAEDIC SURGERY | Admitting: ORTHOPAEDIC SURGERY
Payer: MEDICARE

## 2018-05-15 ENCOUNTER — ANESTHESIA (OUTPATIENT)
Dept: SURGERY | Age: 78
DRG: 470 | End: 2018-05-15
Payer: MEDICARE

## 2018-05-15 DIAGNOSIS — Z96.651 STATUS POST RIGHT KNEE REPLACEMENT: Primary | ICD-10-CM

## 2018-05-15 PROBLEM — M17.11 PRIMARY LOCALIZED OSTEOARTHRITIS OF RIGHT KNEE: Status: ACTIVE | Noted: 2018-05-15

## 2018-05-15 LAB
GLUCOSE BLD STRIP.AUTO-MCNC: 117 MG/DL (ref 65–100)
SERVICE CMNT-IMP: ABNORMAL

## 2018-05-15 PROCEDURE — 74011250636 HC RX REV CODE- 250/636: Performed by: ORTHOPAEDIC SURGERY

## 2018-05-15 PROCEDURE — 76060000035 HC ANESTHESIA 2 TO 2.5 HR: Performed by: ORTHOPAEDIC SURGERY

## 2018-05-15 PROCEDURE — G8979 MOBILITY GOAL STATUS: HCPCS

## 2018-05-15 PROCEDURE — 77030016547 HC BLD SAW SAG1 STRY -B: Performed by: ORTHOPAEDIC SURGERY

## 2018-05-15 PROCEDURE — 74011250636 HC RX REV CODE- 250/636

## 2018-05-15 PROCEDURE — 74011000250 HC RX REV CODE- 250: Performed by: ORTHOPAEDIC SURGERY

## 2018-05-15 PROCEDURE — 77030018836 HC SOL IRR NACL ICUM -A: Performed by: ORTHOPAEDIC SURGERY

## 2018-05-15 PROCEDURE — 77030011640 HC PAD GRND REM COVD -A: Performed by: ORTHOPAEDIC SURGERY

## 2018-05-15 PROCEDURE — 77030002933 HC SUT MCRYL J&J -A: Performed by: ORTHOPAEDIC SURGERY

## 2018-05-15 PROCEDURE — 76010000171 HC OR TIME 2 TO 2.5 HR INTENSV-TIER 1: Performed by: ORTHOPAEDIC SURGERY

## 2018-05-15 PROCEDURE — 77030018846 HC SOL IRR STRL H20 ICUM -A: Performed by: ORTHOPAEDIC SURGERY

## 2018-05-15 PROCEDURE — 77030032490 HC SLV COMPR SCD KNE COVD -B

## 2018-05-15 PROCEDURE — 74011250637 HC RX REV CODE- 250/637: Performed by: ORTHOPAEDIC SURGERY

## 2018-05-15 PROCEDURE — C9290 INJ, BUPIVACAINE LIPOSOME: HCPCS | Performed by: ORTHOPAEDIC SURGERY

## 2018-05-15 PROCEDURE — 65270000029 HC RM PRIVATE

## 2018-05-15 PROCEDURE — 77030014077 HC TOWER MX CEM J&J -C: Performed by: ORTHOPAEDIC SURGERY

## 2018-05-15 PROCEDURE — 76210000006 HC OR PH I REC 0.5 TO 1 HR: Performed by: ORTHOPAEDIC SURGERY

## 2018-05-15 PROCEDURE — 77030039266 HC ADH SKN EXOFIN S2SG -A: Performed by: ORTHOPAEDIC SURGERY

## 2018-05-15 PROCEDURE — 74011250636 HC RX REV CODE- 250/636: Performed by: ANESTHESIOLOGY

## 2018-05-15 PROCEDURE — 77030012935 HC DRSG AQUACEL BMS -B: Performed by: ORTHOPAEDIC SURGERY

## 2018-05-15 PROCEDURE — 77030020788: Performed by: ORTHOPAEDIC SURGERY

## 2018-05-15 PROCEDURE — 77030020365 HC SOL INJ SOD CL 0.9% 50ML: Performed by: ORTHOPAEDIC SURGERY

## 2018-05-15 PROCEDURE — 0SRC0J9 REPLACEMENT OF RIGHT KNEE JOINT WITH SYNTHETIC SUBSTITUTE, CEMENTED, OPEN APPROACH: ICD-10-PCS | Performed by: ORTHOPAEDIC SURGERY

## 2018-05-15 PROCEDURE — 77030007866 HC KT SPN ANES BBMI -B: Performed by: NURSE ANESTHETIST, CERTIFIED REGISTERED

## 2018-05-15 PROCEDURE — 77030000032 HC CUF TRNQT ZIMM -B: Performed by: ORTHOPAEDIC SURGERY

## 2018-05-15 PROCEDURE — C1776 JOINT DEVICE (IMPLANTABLE): HCPCS | Performed by: ORTHOPAEDIC SURGERY

## 2018-05-15 PROCEDURE — 77030033067 HC SUT PDO STRATFX SPIR J&J -B: Performed by: ORTHOPAEDIC SURGERY

## 2018-05-15 PROCEDURE — 77030031139 HC SUT VCRL2 J&J -A: Performed by: ORTHOPAEDIC SURGERY

## 2018-05-15 PROCEDURE — 82962 GLUCOSE BLOOD TEST: CPT

## 2018-05-15 PROCEDURE — 77030005515 HC CATH URETH FOL14 BARD -B: Performed by: ORTHOPAEDIC SURGERY

## 2018-05-15 PROCEDURE — 74011000250 HC RX REV CODE- 250

## 2018-05-15 PROCEDURE — 74011000258 HC RX REV CODE- 258

## 2018-05-15 PROCEDURE — C1713 ANCHOR/SCREW BN/BN,TIS/BN: HCPCS | Performed by: ORTHOPAEDIC SURGERY

## 2018-05-15 PROCEDURE — G8978 MOBILITY CURRENT STATUS: HCPCS

## 2018-05-15 PROCEDURE — 74011000258 HC RX REV CODE- 258: Performed by: ORTHOPAEDIC SURGERY

## 2018-05-15 PROCEDURE — 74011250637 HC RX REV CODE- 250/637: Performed by: ANESTHESIOLOGY

## 2018-05-15 DEVICE — IMPLANTABLE DEVICE
Type: IMPLANTABLE DEVICE | Site: KNEE | Status: FUNCTIONAL
Brand: PERSONA®

## 2018-05-15 DEVICE — KIT TKR CEM VIT E SURF AND INSTRMT: Type: IMPLANTABLE DEVICE | Site: KNEE | Status: FUNCTIONAL

## 2018-05-15 DEVICE — IMPLANTABLE DEVICE
Type: IMPLANTABLE DEVICE | Site: KNEE | Status: FUNCTIONAL
Brand: PERSONA® VIVACIT-E®

## 2018-05-15 DEVICE — IMPLANTABLE DEVICE
Type: IMPLANTABLE DEVICE | Site: KNEE | Status: FUNCTIONAL
Brand: PERSONA™

## 2018-05-15 DEVICE — IMPLANTABLE DEVICE
Type: IMPLANTABLE DEVICE | Site: KNEE | Status: FUNCTIONAL
Brand: PERSONA® NATURAL TIBIA®

## 2018-05-15 DEVICE — SMARTSET GMV HIGH PERFORMANCE GENTAMICIN MEDIUM VISCOSITY BONE CEMENT 40G
Type: IMPLANTABLE DEVICE | Site: KNEE | Status: FUNCTIONAL
Brand: SMARTSET

## 2018-05-15 RX ORDER — FENTANYL CITRATE 50 UG/ML
50 INJECTION, SOLUTION INTRAMUSCULAR; INTRAVENOUS AS NEEDED
Status: DISCONTINUED | OUTPATIENT
Start: 2018-05-15 | End: 2018-05-15 | Stop reason: HOSPADM

## 2018-05-15 RX ORDER — ASPIRIN 81 MG/1
81 TABLET ORAL 2 TIMES DAILY
Status: DISCONTINUED | OUTPATIENT
Start: 2018-05-15 | End: 2018-05-17 | Stop reason: HOSPADM

## 2018-05-15 RX ORDER — POLYETHYLENE GLYCOL 3350 17 G/17G
17 POWDER, FOR SOLUTION ORAL DAILY
Status: DISCONTINUED | OUTPATIENT
Start: 2018-05-15 | End: 2018-05-15 | Stop reason: SDUPTHER

## 2018-05-15 RX ORDER — SODIUM CHLORIDE, SODIUM LACTATE, POTASSIUM CHLORIDE, CALCIUM CHLORIDE 600; 310; 30; 20 MG/100ML; MG/100ML; MG/100ML; MG/100ML
1000 INJECTION, SOLUTION INTRAVENOUS CONTINUOUS
Status: DISCONTINUED | OUTPATIENT
Start: 2018-05-15 | End: 2018-05-15 | Stop reason: HOSPADM

## 2018-05-15 RX ORDER — EPHEDRINE SULFATE 50 MG/ML
INJECTION, SOLUTION INTRAVENOUS AS NEEDED
Status: DISCONTINUED | OUTPATIENT
Start: 2018-05-15 | End: 2018-05-15 | Stop reason: HOSPADM

## 2018-05-15 RX ORDER — PREGABALIN 75 MG/1
75 CAPSULE ORAL ONCE
Status: COMPLETED | OUTPATIENT
Start: 2018-05-15 | End: 2018-05-15

## 2018-05-15 RX ORDER — PROPOFOL 10 MG/ML
INJECTION, EMULSION INTRAVENOUS AS NEEDED
Status: DISCONTINUED | OUTPATIENT
Start: 2018-05-15 | End: 2018-05-15 | Stop reason: HOSPADM

## 2018-05-15 RX ORDER — CEFAZOLIN SODIUM/WATER 2 G/20 ML
2 SYRINGE (ML) INTRAVENOUS EVERY 8 HOURS
Status: COMPLETED | OUTPATIENT
Start: 2018-05-15 | End: 2018-05-15

## 2018-05-15 RX ORDER — OXYCODONE HYDROCHLORIDE 5 MG/1
5 TABLET ORAL
Status: DISCONTINUED | OUTPATIENT
Start: 2018-05-15 | End: 2018-05-17 | Stop reason: HOSPADM

## 2018-05-15 RX ORDER — LIDOCAINE HYDROCHLORIDE 20 MG/ML
INJECTION, SOLUTION EPIDURAL; INFILTRATION; INTRACAUDAL; PERINEURAL AS NEEDED
Status: DISCONTINUED | OUTPATIENT
Start: 2018-05-15 | End: 2018-05-15 | Stop reason: HOSPADM

## 2018-05-15 RX ORDER — BUPIVACAINE HYDROCHLORIDE 5 MG/ML
INJECTION, SOLUTION EPIDURAL; INTRACAUDAL AS NEEDED
Status: DISCONTINUED | OUTPATIENT
Start: 2018-05-15 | End: 2018-05-15 | Stop reason: HOSPADM

## 2018-05-15 RX ORDER — PROPOFOL 10 MG/ML
INJECTION, EMULSION INTRAVENOUS
Status: DISCONTINUED | OUTPATIENT
Start: 2018-05-15 | End: 2018-05-15 | Stop reason: HOSPADM

## 2018-05-15 RX ORDER — ONDANSETRON 2 MG/ML
4 INJECTION INTRAMUSCULAR; INTRAVENOUS
Status: ACTIVE | OUTPATIENT
Start: 2018-05-15 | End: 2018-05-16

## 2018-05-15 RX ORDER — SCOLOPAMINE TRANSDERMAL SYSTEM 1 MG/1
1 PATCH, EXTENDED RELEASE TRANSDERMAL
Status: DISCONTINUED | OUTPATIENT
Start: 2018-05-15 | End: 2018-05-17 | Stop reason: HOSPADM

## 2018-05-15 RX ORDER — ACETAMINOPHEN 500 MG
500 TABLET ORAL
Status: DISCONTINUED | OUTPATIENT
Start: 2018-05-15 | End: 2018-05-17 | Stop reason: HOSPADM

## 2018-05-15 RX ORDER — FENTANYL CITRATE 50 UG/ML
25 INJECTION, SOLUTION INTRAMUSCULAR; INTRAVENOUS
Status: DISCONTINUED | OUTPATIENT
Start: 2018-05-15 | End: 2018-05-15 | Stop reason: HOSPADM

## 2018-05-15 RX ORDER — DEXAMETHASONE SODIUM PHOSPHATE 4 MG/ML
INJECTION, SOLUTION INTRA-ARTICULAR; INTRALESIONAL; INTRAMUSCULAR; INTRAVENOUS; SOFT TISSUE AS NEEDED
Status: DISCONTINUED | OUTPATIENT
Start: 2018-05-15 | End: 2018-05-15 | Stop reason: HOSPADM

## 2018-05-15 RX ORDER — LORATADINE 10 MG/1
10 TABLET ORAL DAILY
Status: DISCONTINUED | OUTPATIENT
Start: 2018-05-15 | End: 2018-05-17 | Stop reason: HOSPADM

## 2018-05-15 RX ORDER — AMOXICILLIN 250 MG
1 CAPSULE ORAL 2 TIMES DAILY
Status: DISCONTINUED | OUTPATIENT
Start: 2018-05-15 | End: 2018-05-17 | Stop reason: HOSPADM

## 2018-05-15 RX ORDER — SODIUM CHLORIDE 0.9 % (FLUSH) 0.9 %
5-10 SYRINGE (ML) INJECTION AS NEEDED
Status: DISCONTINUED | OUTPATIENT
Start: 2018-05-15 | End: 2018-05-15 | Stop reason: HOSPADM

## 2018-05-15 RX ORDER — ACETAMINOPHEN 325 MG/1
325-650 TABLET ORAL
Status: DISCONTINUED | OUTPATIENT
Start: 2018-05-16 | End: 2018-05-17 | Stop reason: HOSPADM

## 2018-05-15 RX ORDER — MIDAZOLAM HYDROCHLORIDE 1 MG/ML
0.5 INJECTION, SOLUTION INTRAMUSCULAR; INTRAVENOUS
Status: DISCONTINUED | OUTPATIENT
Start: 2018-05-15 | End: 2018-05-15 | Stop reason: HOSPADM

## 2018-05-15 RX ORDER — DIPHENHYDRAMINE HYDROCHLORIDE 50 MG/ML
12.5 INJECTION, SOLUTION INTRAMUSCULAR; INTRAVENOUS AS NEEDED
Status: DISCONTINUED | OUTPATIENT
Start: 2018-05-15 | End: 2018-05-15 | Stop reason: HOSPADM

## 2018-05-15 RX ORDER — NEBIVOLOL 5 MG/1
20 TABLET ORAL DAILY
Status: DISCONTINUED | OUTPATIENT
Start: 2018-05-16 | End: 2018-05-17 | Stop reason: HOSPADM

## 2018-05-15 RX ORDER — GLYCOPYRROLATE 0.2 MG/ML
INJECTION INTRAMUSCULAR; INTRAVENOUS AS NEEDED
Status: DISCONTINUED | OUTPATIENT
Start: 2018-05-15 | End: 2018-05-15 | Stop reason: HOSPADM

## 2018-05-15 RX ORDER — POLYETHYLENE GLYCOL 3350 17 G/17G
17 POWDER, FOR SOLUTION ORAL DAILY
Status: DISCONTINUED | OUTPATIENT
Start: 2018-05-15 | End: 2018-05-17 | Stop reason: HOSPADM

## 2018-05-15 RX ORDER — ACETAMINOPHEN 500 MG
1000 TABLET ORAL ONCE
Status: COMPLETED | OUTPATIENT
Start: 2018-05-15 | End: 2018-05-15

## 2018-05-15 RX ORDER — SODIUM CHLORIDE 0.9 % (FLUSH) 0.9 %
5-10 SYRINGE (ML) INJECTION EVERY 8 HOURS
Status: DISCONTINUED | OUTPATIENT
Start: 2018-05-16 | End: 2018-05-17 | Stop reason: HOSPADM

## 2018-05-15 RX ORDER — KETAMINE HYDROCHLORIDE 10 MG/ML
INJECTION, SOLUTION INTRAMUSCULAR; INTRAVENOUS AS NEEDED
Status: DISCONTINUED | OUTPATIENT
Start: 2018-05-15 | End: 2018-05-15 | Stop reason: HOSPADM

## 2018-05-15 RX ORDER — MIDAZOLAM HYDROCHLORIDE 1 MG/ML
1 INJECTION, SOLUTION INTRAMUSCULAR; INTRAVENOUS AS NEEDED
Status: DISCONTINUED | OUTPATIENT
Start: 2018-05-15 | End: 2018-05-15 | Stop reason: HOSPADM

## 2018-05-15 RX ORDER — FACIAL-BODY WIPES
10 EACH TOPICAL DAILY PRN
Status: DISCONTINUED | OUTPATIENT
Start: 2018-05-17 | End: 2018-05-17 | Stop reason: HOSPADM

## 2018-05-15 RX ORDER — ROPIVACAINE HYDROCHLORIDE 5 MG/ML
150 INJECTION, SOLUTION EPIDURAL; INFILTRATION; PERINEURAL AS NEEDED
Status: COMPLETED | OUTPATIENT
Start: 2018-05-15 | End: 2018-05-15

## 2018-05-15 RX ORDER — LIDOCAINE HYDROCHLORIDE 10 MG/ML
0.1 INJECTION, SOLUTION EPIDURAL; INFILTRATION; INTRACAUDAL; PERINEURAL AS NEEDED
Status: DISCONTINUED | OUTPATIENT
Start: 2018-05-15 | End: 2018-05-15 | Stop reason: HOSPADM

## 2018-05-15 RX ORDER — OXYCODONE HYDROCHLORIDE 5 MG/1
2.5 TABLET ORAL
Status: DISCONTINUED | OUTPATIENT
Start: 2018-05-15 | End: 2018-05-17 | Stop reason: HOSPADM

## 2018-05-15 RX ORDER — NALOXONE HYDROCHLORIDE 0.4 MG/ML
0.4 INJECTION, SOLUTION INTRAMUSCULAR; INTRAVENOUS; SUBCUTANEOUS AS NEEDED
Status: DISCONTINUED | OUTPATIENT
Start: 2018-05-15 | End: 2018-05-17 | Stop reason: HOSPADM

## 2018-05-15 RX ORDER — MIDAZOLAM HYDROCHLORIDE 1 MG/ML
INJECTION, SOLUTION INTRAMUSCULAR; INTRAVENOUS AS NEEDED
Status: DISCONTINUED | OUTPATIENT
Start: 2018-05-15 | End: 2018-05-15 | Stop reason: HOSPADM

## 2018-05-15 RX ORDER — MORPHINE SULFATE 10 MG/ML
2 INJECTION, SOLUTION INTRAMUSCULAR; INTRAVENOUS
Status: DISCONTINUED | OUTPATIENT
Start: 2018-05-15 | End: 2018-05-15 | Stop reason: HOSPADM

## 2018-05-15 RX ORDER — ONDANSETRON 2 MG/ML
4 INJECTION INTRAMUSCULAR; INTRAVENOUS AS NEEDED
Status: DISCONTINUED | OUTPATIENT
Start: 2018-05-15 | End: 2018-05-15 | Stop reason: HOSPADM

## 2018-05-15 RX ORDER — SODIUM CHLORIDE 9 MG/ML
25 INJECTION, SOLUTION INTRAVENOUS CONTINUOUS
Status: DISCONTINUED | OUTPATIENT
Start: 2018-05-15 | End: 2018-05-15 | Stop reason: HOSPADM

## 2018-05-15 RX ORDER — ROSUVASTATIN CALCIUM 10 MG/1
10 TABLET, COATED ORAL
Status: DISCONTINUED | OUTPATIENT
Start: 2018-05-16 | End: 2018-05-17 | Stop reason: HOSPADM

## 2018-05-15 RX ORDER — DEXAMETHASONE SODIUM PHOSPHATE 10 MG/ML
4 INJECTION INTRAMUSCULAR; INTRAVENOUS ONCE
Status: DISCONTINUED | OUTPATIENT
Start: 2018-05-15 | End: 2018-05-15 | Stop reason: HOSPADM

## 2018-05-15 RX ORDER — SODIUM CHLORIDE, SODIUM LACTATE, POTASSIUM CHLORIDE, CALCIUM CHLORIDE 600; 310; 30; 20 MG/100ML; MG/100ML; MG/100ML; MG/100ML
100 INJECTION, SOLUTION INTRAVENOUS CONTINUOUS
Status: DISCONTINUED | OUTPATIENT
Start: 2018-05-15 | End: 2018-05-15 | Stop reason: HOSPADM

## 2018-05-15 RX ORDER — AMLODIPINE BESYLATE 5 MG/1
5 TABLET ORAL DAILY
Status: DISCONTINUED | OUTPATIENT
Start: 2018-05-16 | End: 2018-05-17 | Stop reason: HOSPADM

## 2018-05-15 RX ORDER — SODIUM CHLORIDE, SODIUM LACTATE, POTASSIUM CHLORIDE, CALCIUM CHLORIDE 600; 310; 30; 20 MG/100ML; MG/100ML; MG/100ML; MG/100ML
INJECTION, SOLUTION INTRAVENOUS
Status: DISCONTINUED | OUTPATIENT
Start: 2018-05-15 | End: 2018-05-15 | Stop reason: HOSPADM

## 2018-05-15 RX ORDER — HYDROXYZINE HYDROCHLORIDE 10 MG/1
10 TABLET, FILM COATED ORAL
Status: DISCONTINUED | OUTPATIENT
Start: 2018-05-15 | End: 2018-05-17 | Stop reason: HOSPADM

## 2018-05-15 RX ORDER — CEFAZOLIN SODIUM/WATER 2 G/20 ML
2 SYRINGE (ML) INTRAVENOUS ONCE
Status: COMPLETED | OUTPATIENT
Start: 2018-05-15 | End: 2018-05-15

## 2018-05-15 RX ORDER — HYDROCHLOROTHIAZIDE 25 MG/1
25 TABLET ORAL DAILY
Status: DISCONTINUED | OUTPATIENT
Start: 2018-05-17 | End: 2018-05-17 | Stop reason: HOSPADM

## 2018-05-15 RX ORDER — OXYCODONE HYDROCHLORIDE 5 MG/1
5 TABLET ORAL AS NEEDED
Status: DISCONTINUED | OUTPATIENT
Start: 2018-05-15 | End: 2018-05-15 | Stop reason: HOSPADM

## 2018-05-15 RX ORDER — SODIUM CHLORIDE 0.9 % (FLUSH) 0.9 %
5-10 SYRINGE (ML) INJECTION AS NEEDED
Status: DISCONTINUED | OUTPATIENT
Start: 2018-05-15 | End: 2018-05-17 | Stop reason: HOSPADM

## 2018-05-15 RX ORDER — ONDANSETRON 2 MG/ML
INJECTION INTRAMUSCULAR; INTRAVENOUS AS NEEDED
Status: DISCONTINUED | OUTPATIENT
Start: 2018-05-15 | End: 2018-05-15 | Stop reason: HOSPADM

## 2018-05-15 RX ORDER — KETOROLAC TROMETHAMINE 30 MG/ML
15 INJECTION, SOLUTION INTRAMUSCULAR; INTRAVENOUS EVERY 6 HOURS
Status: COMPLETED | OUTPATIENT
Start: 2018-05-15 | End: 2018-05-16

## 2018-05-15 RX ORDER — SODIUM CHLORIDE 0.9 % (FLUSH) 0.9 %
5-10 SYRINGE (ML) INJECTION EVERY 8 HOURS
Status: DISCONTINUED | OUTPATIENT
Start: 2018-05-15 | End: 2018-05-15 | Stop reason: HOSPADM

## 2018-05-15 RX ORDER — FENTANYL CITRATE 50 UG/ML
10 INJECTION, SOLUTION INTRAMUSCULAR; INTRAVENOUS
Status: DISCONTINUED | OUTPATIENT
Start: 2018-05-15 | End: 2018-05-16

## 2018-05-15 RX ORDER — SODIUM CHLORIDE 9 MG/ML
125 INJECTION, SOLUTION INTRAVENOUS CONTINUOUS
Status: DISPENSED | OUTPATIENT
Start: 2018-05-15 | End: 2018-05-16

## 2018-05-15 RX ADMIN — PROPOFOL 75 MCG/KG/MIN: 10 INJECTION, EMULSION INTRAVENOUS at 07:42

## 2018-05-15 RX ADMIN — SODIUM CHLORIDE, SODIUM LACTATE, POTASSIUM CHLORIDE, AND CALCIUM CHLORIDE 1000 ML: 600; 310; 30; 20 INJECTION, SOLUTION INTRAVENOUS at 06:47

## 2018-05-15 RX ADMIN — SODIUM CHLORIDE 125 ML/HR: 900 INJECTION, SOLUTION INTRAVENOUS at 23:44

## 2018-05-15 RX ADMIN — OXYCODONE HYDROCHLORIDE 2.5 MG: 5 TABLET ORAL at 14:02

## 2018-05-15 RX ADMIN — EPHEDRINE SULFATE 5 MG: 50 INJECTION, SOLUTION INTRAVENOUS at 08:46

## 2018-05-15 RX ADMIN — SODIUM CHLORIDE 125 ML/HR: 900 INJECTION, SOLUTION INTRAVENOUS at 10:24

## 2018-05-15 RX ADMIN — BUPIVACAINE HYDROCHLORIDE 10 MG: 5 INJECTION, SOLUTION EPIDURAL; INTRACAUDAL at 07:39

## 2018-05-15 RX ADMIN — KETOROLAC TROMETHAMINE 15 MG: 30 INJECTION, SOLUTION INTRAMUSCULAR at 23:47

## 2018-05-15 RX ADMIN — ACETAMINOPHEN 500 MG: 500 TABLET, FILM COATED ORAL at 21:57

## 2018-05-15 RX ADMIN — MIDAZOLAM HYDROCHLORIDE 2 MG: 1 INJECTION, SOLUTION INTRAMUSCULAR; INTRAVENOUS at 07:06

## 2018-05-15 RX ADMIN — STANDARDIZED SENNA CONCENTRATE AND DOCUSATE SODIUM 1 TABLET: 8.6; 5 TABLET, FILM COATED ORAL at 12:02

## 2018-05-15 RX ADMIN — EPHEDRINE SULFATE 5 MG: 50 INJECTION, SOLUTION INTRAVENOUS at 07:36

## 2018-05-15 RX ADMIN — GLYCOPYRROLATE 0.2 MG: 0.2 INJECTION INTRAMUSCULAR; INTRAVENOUS at 07:39

## 2018-05-15 RX ADMIN — MIDAZOLAM HYDROCHLORIDE 1 MG: 1 INJECTION, SOLUTION INTRAMUSCULAR; INTRAVENOUS at 07:33

## 2018-05-15 RX ADMIN — POLYETHYLENE GLYCOL 3350 17 G: 17 POWDER, FOR SOLUTION ORAL at 12:01

## 2018-05-15 RX ADMIN — ASPIRIN 81 MG: 81 TABLET, COATED ORAL at 12:02

## 2018-05-15 RX ADMIN — ONDANSETRON 4 MG: 2 INJECTION INTRAMUSCULAR; INTRAVENOUS at 09:08

## 2018-05-15 RX ADMIN — KETAMINE HYDROCHLORIDE 25 MG: 10 INJECTION, SOLUTION INTRAMUSCULAR; INTRAVENOUS at 07:44

## 2018-05-15 RX ADMIN — EPHEDRINE SULFATE 5 MG: 50 INJECTION, SOLUTION INTRAVENOUS at 08:13

## 2018-05-15 RX ADMIN — SODIUM CHLORIDE 125 ML/HR: 900 INJECTION, SOLUTION INTRAVENOUS at 17:59

## 2018-05-15 RX ADMIN — ACETAMINOPHEN 1000 MG: 500 TABLET, FILM COATED ORAL at 06:34

## 2018-05-15 RX ADMIN — ACETAMINOPHEN 500 MG: 500 TABLET, FILM COATED ORAL at 17:59

## 2018-05-15 RX ADMIN — KETOROLAC TROMETHAMINE 15 MG: 30 INJECTION, SOLUTION INTRAMUSCULAR at 17:59

## 2018-05-15 RX ADMIN — SODIUM CHLORIDE, SODIUM LACTATE, POTASSIUM CHLORIDE, CALCIUM CHLORIDE: 600; 310; 30; 20 INJECTION, SOLUTION INTRAVENOUS at 07:01

## 2018-05-15 RX ADMIN — PROPOFOL 40 MG: 10 INJECTION, EMULSION INTRAVENOUS at 07:36

## 2018-05-15 RX ADMIN — ACETAMINOPHEN 500 MG: 500 TABLET, FILM COATED ORAL at 14:02

## 2018-05-15 RX ADMIN — KETOROLAC TROMETHAMINE 15 MG: 30 INJECTION, SOLUTION INTRAMUSCULAR at 12:02

## 2018-05-15 RX ADMIN — MIDAZOLAM HYDROCHLORIDE 1 MG: 1 INJECTION, SOLUTION INTRAMUSCULAR; INTRAVENOUS at 07:26

## 2018-05-15 RX ADMIN — LIDOCAINE HYDROCHLORIDE 40 MG: 20 INJECTION, SOLUTION EPIDURAL; INFILTRATION; INTRACAUDAL; PERINEURAL at 07:36

## 2018-05-15 RX ADMIN — ROPIVACAINE HYDROCHLORIDE 125 MG: 5 INJECTION, SOLUTION EPIDURAL; INFILTRATION; PERINEURAL at 07:06

## 2018-05-15 RX ADMIN — Medication 2 G: at 15:39

## 2018-05-15 RX ADMIN — ASPIRIN 81 MG: 81 TABLET, COATED ORAL at 21:56

## 2018-05-15 RX ADMIN — Medication 2 G: at 23:42

## 2018-05-15 RX ADMIN — PREGABALIN 75 MG: 75 CAPSULE ORAL at 06:35

## 2018-05-15 RX ADMIN — LORATADINE 10 MG: 10 TABLET ORAL at 12:02

## 2018-05-15 RX ADMIN — Medication 2 G: at 07:37

## 2018-05-15 RX ADMIN — EPHEDRINE SULFATE 5 MG: 50 INJECTION, SOLUTION INTRAVENOUS at 07:58

## 2018-05-15 RX ADMIN — STANDARDIZED SENNA CONCENTRATE AND DOCUSATE SODIUM 1 TABLET: 8.6; 5 TABLET, FILM COATED ORAL at 17:59

## 2018-05-15 RX ADMIN — DEXAMETHASONE SODIUM PHOSPHATE 8 MG: 4 INJECTION, SOLUTION INTRA-ARTICULAR; INTRALESIONAL; INTRAMUSCULAR; INTRAVENOUS; SOFT TISSUE at 07:55

## 2018-05-15 NOTE — ANESTHESIA POSTPROCEDURE EVALUATION
Post-Anesthesia Evaluation and Assessment    Patient: Kianna Castro MRN: [id number]  SSN: [id number]    YOB: 1940  Age: 68 y.o. Sex: female       Cardiovascular Function/Vital Signs  Visit Vitals    /48    Pulse 62    Temp 36.4 °C (97.5 °F)    Resp 18    Ht 5' 2\" (1.575 m)    Wt 73.5 kg (162 lb)    SpO2 98%    BMI 29.63 kg/m2       Patient is status post spinal anesthesia for Procedure(s):  RIGHT TOTAL KNEE REPLACEMENT  (SPINAL W/IV SED). Nausea/Vomiting: None    Postoperative hydration reviewed and adequate. Pain:  Pain Scale 1: Numeric (0 - 10) (05/15/18 1021)  Pain Intensity 1: 0 (05/15/18 1021)   Managed    Neurological Status:   Neuro (WDL):  (no change) (05/15/18 1021)   At baseline    Mental Status and Level of Consciousness: Arousable    Pulmonary Status:   O2 Device: Nasal cannula (05/15/18 1021)   Adequate oxygenation and airway patent    Complications related to anesthesia: None    Post-anesthesia assessment completed.  No concerns    Signed By: Timmy Swann MD     May 15, 2018

## 2018-05-15 NOTE — PROGRESS NOTES
Primary Nurse Jeanna Nelson RN and Narciso Escobar RN performed a dual skin assessment on this patient No impairment noted  Erik score is 20

## 2018-05-15 NOTE — PROGRESS NOTES
TRANSFER - IN REPORT:    Verbal report received from Dave Murguia, [address] [facility] (name) on Gita Garcia  being received from [facility]) for routine post - op      Report consisted of patients Situation, Background, Assessment and   Recommendations(SBAR). Information from the following report(s) SBAR, Kardex, OR Summary, Intake/Output, MAR and Recent Results was reviewed with the receiving nurse. Opportunity for questions and clarification was provided. Assessment completed upon patients arrival to unit and care assumed.

## 2018-05-15 NOTE — OP NOTES
[facility] REPORT    Radhames Owens  MR#: [id number]  : 1940  ACCOUNT #: [de-identified]   DATE OF SERVICE: 05/15/2018    SURGEON:  Juanito Littlejohn MD    FIRST ASSISTANT:   Deon Huber SA    PREOPERATIVE DIAGNOSIS:  Osteoarthritis, right knee. POSTOPERATIVE DIAGNOSIS:  Osteoarthritis, right knee. PROCEDURE PERFORMED:  Right total knee arthroplasty. COMPONENTS IMPLANTED:  Sukhi Persona size 7 narrow posterior stabilized femur, size E tibial tray with 11 mm posterior stabilized polyethylene insert and 32 mm patella. ANESTHESIA:  Spinal with sedation as well as adductor canal block. COMPLICATIONS:  None. ESTIMATED BLOOD LOSS:  100 mL. SPECIMENS REMOVED:  None. INDICATIONS:  The patient is a 72-year-old female with progressive right knee pain due to severe tricompartmental arthritis. Symptoms have progressed despite comprehensive conservative treatment. She presents for right total knee replacement. Risks, benefits and alternatives of the procedure were reviewed with her in detail. She desires to proceed. PROCEDURE IN DETAIL:  Anesthesia team placed an adductor canal block before taking the patient to the operating room where they also placed a spinal.  Preoperative IV antibiotics were administered. Nagy catheter was inserted and a padded pneumatic tourniquet was placed around the right upper thigh. Right lower extremity was prepped and draped in the usual sterile fashion. Tourniquet was inflated to 275. Through a midline anterior knee incision, I performed a medial parapatellar arthrotomy. Progressive medial release was performed to facilitate exposure and soft tissue balance throughout the procedure. I took 10 mm off the distal femur using a 5 degree distal femoral cutting block over a long intramedullary tiki. The femur was sized to a 7, prepared for size 7 posterior stabilized component utilizing appropriate jigs.   Rotational alignment was gauged off of Whitesides line as well as the transepicondylar access. Neutral proximal tibial resection was performed using an extramedullary alignment guide. Menisci were excised and posterior osteophytes were removed from the femur. Subperiosteal  posterior capsular release was performed. Progressive medial release was performed until they were symmetrical and equal flexion and extension gaps with a 10 mm spacer block. Trials were inserted and with the 10 mm insert in place, the knee had full extension to gravity and satisfactory coronal plane stability throughout arc of motion. Patella was prepared for a 32 mm component and tracked centrally using no thumbs technique. Trials were removed and bony surfaces were copiously irrigated with pulse lavage and dried before the real components were cemented in place using antibiotic impregnated cement. Excess cement was removed. The knee was reduced in full extension with the real insert in place during cement setup. Periarticular soft tissues were injected with a solution containing Exparel as well as 0.5% Marcaine with epinephrine. Tourniquet was released and hemostasis was obtained with the Bovie. Wound was irrigated. Arthrotomy was closed with a combination of heavy Vicryl sutures and a running #2 Stratafix suture. Skin and subcutaneous layers were closed in layered fashion with Vicryl and a running Monocryl subcuticular stitch. Wound was dressed with Dermabond and an Aquacel occlusive dressing as well as a sterile compressive dressing. The patient was transported to the postanesthesia care unit in stable condition. All counts were correct at the end of the procedure.       Sarthak Riley MD       JRH / LN  D: 05/15/2018 09:30     T: 05/15/2018 10:29  JOB #: [id number]  CC: Frantz Lee MD  CC: Chase Burkitt MD

## 2018-05-15 NOTE — H&P
Ms. Xenia Stone presents for R TKA. Pain is diffuse. Worst with activities and at [facility]. Severe pain with all bent knee activities. Mechanical symptoms without falls. Daily wakening night pain. Progressed despite conservative treatemtn measures including prescription NSAIDs, multiple steroid injections, multiple courses of viscosupplementation. Past Medical History:   Diagnosis Date    Arthritis     Asthma     albuterol    CAD (coronary artery disease)     Elevated glucose     borderline elevated A1c    GERD (gastroesophageal reflux disease)     Hypercholesterolemia     Hypertension     Nausea & vomiting     MALINI on CPAP      Past Surgical History:   Procedure Laterality Date    ECHO STRESS  2007    normal resting LV wall motion and no evidence of ischemia. The ejection fraction was 50-60%.  HOLTER MONITOR  2007    frequent ventricular extrasystoles. Rare couplets and triplets. NSR during diary entries.      HX HYSTERECTOMY  1986    HX ORTHOPAEDIC  1996    Right ROTATER CUFF SURGERY     HX ORTHOPAEDIC  2010    R KNEE MENISCUS    HX ORTHOPAEDIC  4/2013    left tibia repair    HX ORTHOPAEDIC  03/2012    LEFT ROTATER CUFF REPAIR     HX ORTHOPAEDIC  03/2009    THUMB TRIGGER FINGER RELEASE    HX OTHER SURGICAL      CARPAL TUNNEL BILATERAL, TRIGGER THUMB RELEASE    HX OTHER SURGICAL      Carotid dopplers 11/11- mild disease bilat    HX OTHER SURGICAL      Lexiscan Cardiolite 8/23/12 - normal; LVEF 65%    HX OTHER SURGICAL      Echo 8/23/12 - TDS, LVEF 60%, no sig valve disease, RVSP 25    HX OTHER SURGICAL      Carotid Duplex 3/14/13 - 10-49% stenosis bilat     HX OTHER SURGICAL      LE ZE's 3/14/13 - normal ZE's      Allergies   Allergen Reactions    Azithromycin Shortness of Breath     Heart slows down  Heart slows down    Tetanus Toxoid, Adsorbed Shortness of Breath    Adhesive Tape-Silicones Rash    Codeine Hives     N/V  N/V    Dilaudid [Hydromorphone (Bulk)] Nausea and Vomiting    Dilaudid [Hydromorphone] Unknown (comments)     Other reaction(s): Unknown (comments)    Ivp Dye [Fd And C Blue No.1] Hives     SWELLING    Metrizamide Hives    Novocain [Procaine] Unknown (comments)     Other reaction(s): Unknown (comments)    Sectral [Acebutolol] Hives and Swelling    Sulfa (Sulfonamide Antibiotics) Nausea and Vomiting     And hives    Sulfasalazine Hives    Talwin [Pentazocine Lactate] Swelling    Tetanus Vaccines And Toxoid Shortness of Breath    Vasotec [Enalapril Maleate] Other (comments)     ELEVATED BP       No current facility-administered medications on file prior to encounter. Current Outpatient Prescriptions on File Prior to Encounter   Medication Sig Dispense Refill    hydroCHLOROthiazide (HYDRODIURIL) 25 mg tablet Take 1 Tab by mouth daily. 90 Tab 1    aspirin delayed-release 81 mg tablet Take  by mouth daily.  MULTIVITAMIN (MULTIPLE VITAMINS PO) Take  by mouth daily.  rosuvastatin (CRESTOR) 10 mg tablet Take 1 Tab by mouth nightly. 90 Tab 1    albuterol (PROVENTIL HFA, VENTOLIN HFA, PROAIR HFA) 90 mcg/actuation inhaler Take 2 Puffs by inhalation as needed.  cyanocobalamin (VITAMIN B-12) 1,000 mcg/mL injection 1,000 mcg by IntraMUSCular route once. MONTHLY, APPROX 3/2018       Social History     Social History    Marital status:      Spouse name: N/A    Number of children: N/A    Years of education: N/A     Occupational History    Not on file.      Social History Main Topics    Smoking status: Never Smoker    Smokeless tobacco: Never Used    Alcohol use No    Drug use: No    Sexual activity: No     Other Topics Concern    Not on file     Social History Narrative     Family History   Problem Relation Age of Onset    Cancer Mother      LUNG AND CERVICAL CANCER    Heart Disease Father 46    Coronary Artery Disease Father     Heart Attack Father     Dementia Sister     Heart Disease Brother     Cancer Brother      COLON    Cancer Brother      THROAT CANCER    Diabetes Brother     Lung Disease Brother     Sleep Apnea Brother     Anesth Problems Neg Hx        ROS: no cp/palpitations; no cough/sob; no fever/chills; no blurred vis/headache    Exam:  Alert  Chest cta  Heart RRR, 1/6 systolic murmur  abd soft NT  R knee TTP, pain with ROM  Motor 5/5  Pulses symm    Xrays- complete loss of joint space in PF and med compartments    Imp/Plan:  Severe djd R knee    Proceed with R TKA. Discussed risks/benefits in detail and she desires to proceed.     Michele Mcleod MD

## 2018-05-15 NOTE — ANESTHESIA PROCEDURE NOTES
Peripheral Block    Start time: 5/15/2018 7:02 AM  End time: 5/15/2018 7:10 AM  Performed by: Surendra Wright  Authorized by: Surendra Wright       Pre-procedure: Indications: at surgeon's request and post-op pain management    Preanesthetic Checklist: patient identified, risks and benefits discussed, site marked, timeout performed, anesthesia consent given and patient being monitored    Timeout Time: 07:02          Block Type:   Block Type:   Adductor canal  Laterality:  Right  Monitoring:  Standard ASA monitoring, continuous pulse ox, frequent vital sign checks, heart rate, responsive to questions and oxygen  Injection Technique:  Single shot  Procedures: ultrasound guided    Patient Position: supine  Prep: chlorhexidine    Location:  Mid thigh  Needle Type:  Stimuplex  Needle Gauge:  22 G  Needle Localization:  Ultrasound guidance  Medication Injected:  0.5%  ropivacaine  Volume (mL):  25    Assessment:  Number of attempts:  1  Injection Assessment:  Incremental injection every 5 mL, local visualized surrounding nerve on ultrasound, negative aspiration for blood, no paresthesia, no intravascular symptoms and negative aspiration for CSF  Patient tolerance:  Patient tolerated the procedure well with no immediate complications

## 2018-05-15 NOTE — PROGRESS NOTES
Problem: Mobility Impaired (Adult and Pediatric)  Goal: *Acute Goals and Plan of Care (Insert Text)  Physical Therapy Goals  Initiated 5/15/2018    1. Patient will move from supine to sit and sit to supine , scoot up and down and roll side to side in bed with modified independence within 4 days. 2. Patient will perform sit to stand with modified independence within 4 days. 3. Patient will ambulate with modified independence for 150 feet with the least restrictive device within 4 days. 4. Patient will ascend/descend 2 stairs with SPC with modified independence within 4 days. 5. Patient will perform home exercise program per protocol with modified independence within 4 days. 6. Patient will demonstrate AROM 0-90 degrees in operative joint within 4 days. physical Therapy knee EVALUATION  Patient: Aracelis Amaya (48 y.o. female)  Date: 5/15/2018  Primary Diagnosis: OSTEOARTHRITIS RIGHT KNEE  Primary localized osteoarthritis of right knee  Procedure(s) (LRB):  RIGHT TOTAL KNEE REPLACEMENT  (SPINAL W/IV SED) (Right) Day of Surgery   Precautions:   WBAT    ASSESSMENT :  Based on the objective data described below, the patient presents with decreased R knee ROM and strength, impaired balance requiring B UE support to RW, and overall decreased independence following admission for R TKR POD 0. PTA patient was independent and lived alone. Currently she is CGA for bed mobility and transfers with RW. Ambulated 30 feet with RW and CGA, slight step through pattern. Patient returned to supine with CGA. Completed supine exercises as described below. Patient plans to discharge tomorrow. RW order placed in chart. Recommend HHPT. Patient will benefit from skilled intervention to address the above impairments.   Patients rehabilitation potential is considered to be Good  Factors which may influence rehabilitation potential include:   []         None noted  []         Mental ability/status  [x]         Medical condition  []         Home/family situation and support systems  []         Safety awareness  []         Pain tolerance/management  []         Other:      PLAN :  Recommendations and Planned Interventions:  [x]           Bed Mobility Training             [x]    Neuromuscular Re-Education  [x]           Transfer Training                   []    Orthotic/Prosthetic Training  [x]           Gait Training                         []    Modalities  [x]           Therapeutic Exercises           []    Edema Management/Control  [x]           Therapeutic Activities            [x]    Patient and Family Training/Education  []           Other (comment):    Frequency/Duration: Patient will be followed by physical therapy twice daily to address goals. Discharge Recommendations: Home Health  Further Equipment Recommendations for Discharge: RW order placed in chart     SUBJECTIVE:   Patient stated This wasn't as bad as I thought.     OBJECTIVE DATA SUMMARY:   HISTORY:    Past Medical History:   Diagnosis Date    Arthritis     Asthma     albuterol    CAD (coronary artery disease)     Elevated glucose     borderline elevated A1c    GERD (gastroesophageal reflux disease)     Hypercholesterolemia     Hypertension     Nausea & vomiting     MALINI on CPAP      Past Surgical History:   Procedure Laterality Date    ECHO STRESS  2007    normal resting LV wall motion and no evidence of ischemia. The ejection fraction was 50-60%.  HOLTER MONITOR  2007    frequent ventricular extrasystoles. Rare couplets and triplets. NSR during diary entries.      HX HYSTERECTOMY  1986    HX ORTHOPAEDIC  1996    Right ROTATER CUFF SURGERY     HX ORTHOPAEDIC  2010    R KNEE MENISCUS    HX ORTHOPAEDIC  4/2013    left tibia repair    HX ORTHOPAEDIC  03/2012    LEFT ROTATER CUFF REPAIR     HX ORTHOPAEDIC  03/2009    THUMB TRIGGER FINGER RELEASE    HX OTHER SURGICAL      CARPAL TUNNEL BILATERAL, TRIGGER THUMB RELEASE    HX OTHER SURGICAL Carotid dopplers 11/11- mild disease bilat    HX OTHER SURGICAL      Lexiscan Cardiolite 8/23/12 - normal; LVEF 65%    HX OTHER SURGICAL      Echo 8/23/12 - TDS, LVEF 60%, no sig valve disease, RVSP 25    HX OTHER SURGICAL      Carotid Duplex 3/14/13 - 10-49% stenosis bilat     HX OTHER SURGICAL      LE ZE's 3/14/13 - normal ZE's      Prior Level of Function/Home Situation: independent, lives alone  Personal factors and/or comorbidities impacting plan of care:     Home Situation  Home Environment: Private residence  # Steps to Enter: 2  Rails to Enter: No  One/Two Story Residence: One story  Living Alone: Yes  Support Systems: Family member(s)  Patient Expects to be Discharged to[de-identified] Private residence  Current DME Used/Available at Home: Cane, straight    EXAMINATION/PRESENTATION/DECISION MAKING:   Critical Behavior:  Neurologic State: Alert  Orientation Level: Oriented X4  Cognition: Appropriate decision making, Appropriate for age attention/concentration, Appropriate safety awareness, Follows commands     Hearing: Auditory  Auditory Impairment: None  Skin:    Range Of Motion:  AROM: Generally decreased, functional           PROM: Generally decreased, functional           Strength:    Strength: Generally decreased, functional                    Tone & Sensation:                                  Coordination:     Vision:      Functional Mobility:  Bed Mobility:     Supine to Sit: Minimum assistance  Sit to Supine: Contact guard assistance     Transfers:  Sit to Stand: Contact guard assistance  Stand to Sit: Contact guard assistance  Stand Pivot Transfers: Contact guard assistance                    Balance:   Sitting: Intact  Standing: Intact; With support  Ambulation/Gait Training:  Distance (ft): 30 Feet (ft)  Assistive Device: Gait belt;Walker, rolling  Ambulation - Level of Assistance: Contact guard assistance        Gait Abnormalities: Antalgic;Decreased step clearance  Right Side Weight Bearing: As tolerated     Base of Support: Shift to left  Stance: Right decreased  Speed/Vania: Pace decreased (<100 feet/min)  Step Length: Right shortened;Left shortened                     Stairs: Therapeutic Exercises: Ankle pumps, quad sets, heel slides x 10    Functional Measure:  Timed up and go:    Timed Get Up And Go Test: 18     Timed Up and Go and G-code impairment scale:  Percentage of Impairment CH    0%   CI    1-19% CJ    20-39% CK    40-59% CL    60-79% CM    80-99% CN     100%   Timed   Score 0-56 10 11-12 13-14 15-16 17-18 19 20       < than 10 seconds=Normal  Greater then 13.5 seconds (in elderly)=Increased fall risk   Juwan KIM, David Muller. Predicting the probability for falls in community dwelling older adults using the Timed Up and Go Test. Phys Ther. 2000;80:896-903. G codes: In compliance with CMSs Claims Based Outcome Reporting, the following G-code set was chosen for this patient based on their primary functional limitation being treated: The outcome measure chosen to determine the severity of the functional limitation was the TUG with a score of 18 seconds which was correlated with the impairment scale. ? Mobility - Walking and Moving Around:     - CURRENT STATUS: CL - 60%-79% impaired, limited or restricted    - GOAL STATUS: CK - 40%-59% impaired, limited or restricted    - D/C STATUS:  ---------------To be determined---------------            Pain:  Pain Scale 1: Numeric (0 - 10)  Pain Intensity 1: 1  Pain Location 1: Knee  Pain Orientation 1: Right  Pain Description 1: Burning  Pain Intervention(s) 1: Medication (see MAR)  Activity Tolerance:   Improving  Please refer to the flowsheet for vital signs taken during this treatment.   After treatment:   []         Patient left in no apparent distress sitting up in chair  [x]         Patient left in no apparent distress in bed  [x]         Call bell left within reach  [x]         Nursing notified  [x]         Caregiver present  []         Bed alarm activated    COMMUNICATION/EDUCATION:   The patients plan of care was discussed with: Registered Nurse. [x]         Fall prevention education was provided and the patient/caregiver indicated understanding. [x]         Patient/family have participated as able in goal setting and plan of care. [x]         Patient/family agree to work toward stated goals and plan of care. []         Patient understands intent and goals of therapy, but is neutral about his/her participation. []         Patient is unable to participate in goal setting and plan of care.     Thank you for this referral.  Logan Truong, PT

## 2018-05-15 NOTE — PROGRESS NOTES
Bedside and Verbal shift change report given to Javier Roberts RN (oncoming nurse) by Jennifer Henderson RN (offgoing nurse). Report included the following information SBAR, Kardex, Intake/Output, MAR and Recent Results.

## 2018-05-15 NOTE — PERIOP NOTES
TRANSFER - OUT REPORT:    Verbal report given to 5 S AMARIS Anton(name) on Richard Boyle  being transferred to [facility](unit) for routine post - op       Report consisted of patients Situation, Background, Assessment and   Recommendations(SBAR). Time Pre op antibiotic [id number]:[id number]  Anesthesia Stop time: 0158  Nagy Present on Transfer to floor:y  Order for Nagy on Chart:y  Discharge Prescriptions with Chart:n    Information from the following report(s) SBAR, OR Summary, Intake/Output, MAR and Cardiac Rhythm NSR was reviewed with the receiving nurse. Opportunity for questions and clarification was provided. Is the patient on 02? YES       L/Min 2       Other     Is the patient on a monitor? NO    Is the nurse transporting with the patient? NO    Surgical Waiting Area notified of patient's transfer from PACU? YES, via volunteer Norma Gutierrez      The following personal items collected during your admission accompanied patient upon transfer:   Dental Appliance: Dental Appliances:  (lower partial returned and pt placed in her mouth in pacu)  Vision: Visual Aid: Glasses--daughter has her readers, per pt  Hearing Aid:    Jewelry: Jewelry: None  Clothing: Clothing:  (clothing bag placed on pt's stretcher in pacu)  Other Valuables:  Other Valuables: None  Valuables sent to safe:

## 2018-05-15 NOTE — IP AVS SNAPSHOT
[address] [facility] [address] [place] [address] 
[phone] Patient: Lucy Duffy MRN: [id number] YZC:[id number] About your hospitalization You were admitted on:  May 15, 2018 [facility] on:  May 17, 2018 Why you were hospitalized Your primary diagnosis was:  Primary Localized Osteoarthritis Of Right Knee Follow-up Information Follow up With Details Comments Contact Info [address] [facility] skilled nursing and physical therapy. [address] [place] [address] 
[phone] MD ABENA Moran/ Maegan [facility] [address] [facility] 
[phone] Discharge Orders None A check jovani indicates which time of day the medication should be taken. My Medications START taking these medications Instructions Each Dose to Equal  
 Morning Noon Evening Bedtime  
 ondansetron 4 mg disintegrating tablet Commonly known as:  ZOFRAN ODT Your last dose was: Your next dose is: Take 0.5 Tabs by mouth every eight (8) hours as needed for Nausea. Notify your doctor if having any abdominal pain or distention. Indications: Postop Nausea  
 2 mg  
    
   
   
   
  
 oxyCODONE IR 5 mg immediate release tablet Commonly known as:  Elvin Ana Lilia Your last dose was: Your next dose is: Take 0.5-1 Tabs by mouth every four (4) hours as needed. Max Daily Amount: 30 mg. Indications: Pain 2.5-5 mg  
    
   
   
   
  
 senna-docusate 8.6-50 mg per tablet Commonly known as:  B[place]ford Essence Your last dose was: Your next dose is: Take 1 Tab by mouth two (2) times a day. Indications: constipation 1 Tab CHANGE how you take these medications  Instructions Each Dose to Equal  
 Morning Noon Evening Bedtime  
 acetaminophen 500 mg tablet Commonly known as:  TYLENOL What changed:   
- medication strength - when to take this 
- reasons to take this Your last dose was: Your next dose is: Take 1 Tab by mouth every four (4) hours (while awake). Indications: Pain 500 mg  
    
   
   
   
  
 aspirin delayed-release 81 mg tablet What changed:   
- how much to take - when to take this 
- additional instructions Your last dose was: Your next dose is: Take 1 Tab by mouth two (2) times a day. For blood clot prevention. 81 mg CONTINUE taking these medications Instructions Each Dose to Equal  
 Morning Noon Evening Bedtime ADVIL 200 mg tablet Generic drug:  ibuprofen Your last dose was: Your next dose is: Take 200 mg by mouth every six (6) hours as needed for Pain. 200 mg  
    
   
   
   
  
 albuterol 90 mcg/actuation inhaler Commonly known as:  PROVENTIL HFA, VENTOLIN HFA, PROAIR HFA Your last dose was: Your next dose is: Take 2 Puffs by inhalation as needed. 2 Puff  
    
   
   
   
  
 amLODIPine 5 mg tablet Commonly known as:  Rob Lute Your last dose was: Your next dose is: Take 1 Tab by mouth daily. 5 mg CLARITIN 10 mg tablet Generic drug:  loratadine Your last dose was: Your next dose is: Take 10 mg by mouth daily. 10 mg  
    
   
   
   
  
 hydroCHLOROthiazide 25 mg tablet Commonly known as:  HYDRODIURIL Your last dose was: Your next dose is: Take 1 Tab by mouth daily. 25 mg MIRALAX 17 gram packet Generic drug:  polyethylene glycol Your last dose was: Your next dose is: Take 17 g by mouth daily. 17 g  MULTIPLE VITAMINS PO  
   
 Your last dose was: Your next dose is: Take  by mouth daily. nebivolol 20 mg tablet Commonly known as:  BYSTOLIC Your last dose was: Your next dose is: Take 1 Tab by mouth daily. 20 mg  
    
   
   
   
  
 rosuvastatin 10 mg tablet Commonly known as:  CRESTOR Your last dose was: Your next dose is: Take 1 Tab by mouth nightly. 10 mg  
    
   
   
   
  
 VITAMIN B-12 1,000 mcg/mL injection Generic drug:  cyanocobalamin Your last dose was: Your next dose is:    
   
   
 1,000 mcg by IntraMUSCular route once. MONTHLY, APPROX 3/2018  
 1000 mcg Where to Get Your Medications These medications were sent to [facility], [address]  [address] [place] [address] Phone:  [phone]  
  acetaminophen 500 mg tablet Information on where to get these meds will be given to you by the nurse or doctor. ! Ask your nurse or doctor about these medications  
  aspirin delayed-release 81 mg tablet  
 ondansetron 4 mg disintegrating tablet  
 oxyCODONE IR 5 mg immediate release tablet  
 senna-docusate 8.6-50 mg per tablet Opioid Education Prescription Opioids: What You Need to Know: 
 
Prescription opioids can be used to help relieve moderate-to-severe pain and are often prescribed following a surgery or injury, or for certain health conditions. These medications can be an important part of treatment but also come with serious risks. Opioids are strong pain medicines. Examples include hydrocodone, oxycodone, fentanyl, and morphine. Heroin is an example of an illegal opioid. It is important to work with your health care provider to make sure you are getting the safest, most effective care. WHAT ARE THE RISKS AND SIDE EFFECTS OF OPIOID USE? Prescription opioids carry serious risks of addiction and overdose, especially with prolonged use. An opioid overdose, often marked by slow breathing, can cause sudden death. The use of prescription opioids can have a number of side effects as well, even when taken as directed. · Tolerance-meaning you might need to take more of a medication for the same pain relief · Physical dependence-meaning you have symptoms of withdrawal when the medication is stopped. Withdrawal symptoms can include nausea, sweating, chills, diarrhea, stomach cramps, and muscle aches. Withdrawal can last up to several weeks, depending on which drug you took and how long you took it. · Increased sensitivity to pain · Constipation · Nausea, vomiting, and dry mouth · Sleepiness and dizziness · Confusion · Depression · Low levels of testosterone that can result in lower sex drive, energy, and strength · Itching and sweating RISKS ARE GREATER WITH:      
· History of drug misuse, substance use disorder, or overdose · Mental health conditions (such as depression or anxiety) · Sleep apnea · Older age (72 years or older) · Pregnancy Avoid alcohol while taking prescription opioids. Also, unless specifically advised by your health care provider, medications to avoid include: · Benzodiazepines (such as Xanax or Valium) · Muscle relaxants (such as Soma or Flexeril) · Hypnotics (such as Ambien or Lunesta) · Other prescription opioids KNOW YOUR OPTIONS Talk to your health care provider about ways to manage your pain that don't involve prescription opioids. Some of these options may actually work better and have fewer risks and side effects. Options may include: 
· Pain relievers such as acetaminophen, ibuprofen, and naproxen · Some medications that are also used for depression or seizures · Physical therapy and exercise · Counseling to help patients learn how to cope better with triggers of pain and stress. · Application of heat or cold compress · Massage therapy · Relaxation techniques Be Informed Make sure you know the name of your medication, how much and how often to take it, and its potential risks & side effects. IF YOU ARE PRESCRIBED OPIOIDS FOR PAIN: 
· Never take opioids in greater amounts or more often than prescribed. Remember the goal is not to be pain-free but to manage your pain at a tolerable level. · Follow up with your primary care provider to: · Work together to create a plan on how to manage your pain. · Talk about ways to help manage your pain that don't involve prescription opioids. · Talk about any and all concerns and side effects. · Help prevent misuse and abuse. · Never sell or share prescription opioids · Help prevent misuse and abuse. · Store prescription opioids in a secure place and out of reach of others (this may include visitors, children, friends, and family). · Safely dispose of unused/unwanted prescription opioids: Find your community drug take-back program or your pharmacy mail-back program, or flush them down the toilet, following guidance from the Food and Drug Administration (www.fda.gov/Drugs/ResourcesForYou). · Visit www.cdc.gov/drugoverdose to learn about the risks of opioid abuse and overdose. · If you believe you may be struggling with addiction, tell your health care provider and ask for guidance or call [address] [place] [address] at [phone]-ZCHE. Discharge Instructions Patient meets criteria for BUNDLED PAYMENT  
for Care Improvement Initiative Criteria Contact Information for Orthopedic Nurse Navigator:     
SHIRA Gupta, AMARIS-BC 
I:[phone] D:[phone] A:[phone] After Hospital Care Plan:  Discharge Instructions Knee Replacement- Dr. Sierra Angelo Patient Name: Cheikh Roque Date of procedure: 5/15/2018 Procedure: Procedure(s): 
 RIGHT TOTAL KNEE REPLACEMENT  (SPINAL W/IV SED) Surgeon: Surgeon(s) and Role: Deborah Lobmardo MD - Primary PCP: Joshua Bernstein MD 
Date of discharge: No discharge date for patient encounter. Follow up appointments ? Follow up with Dr. Dimitrios Kennedy in 3 weeks. Call [phone] to make an appointment. ? If home health has been arranged for you the agency will contact you to arrange dates/times for visits. Please call them if you do not hear from them within 24 hours after you are discharged When to call your Orthopaedic Surgeon: Call [phone]. If you call after 5pm or on a weekend, the on call physician will be contacted ? Unrelieved pain ? Signs of infection-if your incision is red; continues to have drainage; drainage has a foul odor or if you have a persistent fever over 101 degrees ? Signs of a blood clot in your leg-calf pain, tenderness, redness, swelling of lower leg When to call your Primary Care Physician: 
? Concerns about medical conditions such as diabetes, high blood pressure, asthma, congestive heart failure ? Call if blood sugars are elevated, persistent headache or dizziness, coughing or congestion, constipation or diarrhea, burning with urination, abnormal heart rate When to call 662lnd go to the nearest emergency room ? Acute onset of chest pain, shortness of breath, difficulty breathing Activity ? Weight bearing as tolerated with walker or crutches. Refer to pages 23-31 of your handbook for instructions and pictures ? Complete your Home Exercise Program daily as instructed by your therapist.  Refer to pages 33-41 of your handbook for instructions and pictures ? Get up every one hour and walk (except at night when sleeping) ? Do not drive or operate heavy machinery Incision Care ? The Aquacel (brown, waterproof) surgical dressing is to remain on your knee for 7 days.  On the 7th day have someone gently peel the dressing off by carefully lifting the edge and stretching it slightly to break the adhesive seal 
? If you have steri-strips (small, white pieces of tape) on your incision, they may come off when you remove the Aquacel surgical dressing. This is okay. You may now leave your incision open to air ? If your Aquacel dressing comes loose/off before the 7th day, you may replace it with a dry sterile gauze dressing; change it daily. Once you incision is not draining, you may leave it open to air ? You may take a shower with the Aquacel dressing in place. Once the Aquacel is removed, you may shower and get your incision wet but do not submerge your incision under water in a bath tub, hot tub or swimming pool for 6 weeks after surgery. Preventing blood clots ? Take Enteric coated Aspirin (delayed release) one tablet twice a day for one month following surgery Pain management ? Take pain medication as prescribed; decrease the amount you use as your pain lessens ? Avoid alcoholic beverages while taking pain medication ? Please be aware that many medications contain Tylenol (acetaminophen). We do not want you to over medicate so please read the information below as a guide. Do not take more than 3 Grams of Tylenol in a 24 hour period. (There are 1000 milligrams in one Gram) o Tylenol 325 mg per tablet (do not take more than 9 tablets in 24 hours) o Tylenol 500 mg per tablet (do not take more than 6 tablets in 24 hours) o Tylenol 650 mg per tablet (do not take more than 3 tablets in 24 hours) ? Elevate your leg (do not bend/flex knee) and place  ice bags on your knee for 15-20 minutes after exercising Diet ? Resume usual diet; drink plenty of fluids; eat foods high in fiber ? You may want to take a stool softener (such as Senokot-S or Colace) to prevent constipation while you are taking pain medication. If constipation occurs, take a laxative (such as Dulcolax tablets, Milk of Magnesia, or a suppository) Home Health Care Protocol (to be followed by [address]) Nursing-per physicians order ? Complete head to toe assessment, vital signs ? Medication reconciliation ? Review pain management ? Manage chronic medical conditions Physical Therapy-per physicians order Weight bearing status: 
Precautions at Admission: Fall, WBAT Right Side Weight Bearing: As tolerated Mobility Status: 
Supine to Sit: Minimum assistance Sit to Stand: Contact guard assistance Sit to Supine: Contact guard assistance, Additional time (verbal cues) Gait: 
Distance (ft): 150 Feet (ft) Ambulation - Level of Assistance: Contact guard assistance Assistive Device: Gait belt, Walker, rolling Gait Abnormalities: Antalgic, Decreased step clearance, Trunk sway increased ADL status overall composite: 
Dressing Assistance: Supervision/set-up Dressing Assistance: Stand-by assistance Toilet Transfer : Stand-by assistance Physical Therapy ? Assessment and evaluation-bed mobility; functional transfers (bed, chair, bathroom, stairs); ambulation with equipment, car transfers, shower transfers, safety and ability to get out of house in the event of an emergency ? Review weight bearing as tolerated, wean from walker or crutches as tolerated ? Discuss pain management ? Review how to do ADLs. Refer to page 42 of patient handbook Home Exercise Program-refer to pages 33-41 of patient handbook for exercises. DediServe Announcement We are excited to announce that we are making your provider's discharge notes available to you in DediServe. You will see these notes when they are completed and signed by the physician that discharged you from your recent hospital stay. If you have any questions or concerns about any information you see in DediServe, please call the Health Information Department where you were seen or reach out to your Primary Care Provider for more information about your plan of care. Introducing [facility] HE[facility]! Dear Aidan Wilkes: 
Thank you for requesting a Check I'm Here account. Our records indicate that you already have an active Check I'm Here account. You can access your account anytime at [email or web address] Did you know that you can access your hospital and ER discharge instructions at any time in Check I'm Here? You can also review all of your test results from your hospital stay or ER visit. Additional Information If you have questions, please visit the Frequently Asked Questions section of the Check I'm Here website at [email or web address] Remember, Check I'm Here is NOT to be used for urgent needs. For medical emergencies, dial 911. Now available from your iPhone and Android! Introducing Eleazar Sebastian As a [employer] patient, I wanted to make you aware of our electronic visit tool called Eleazar Sebastian. [employer] 24/7 allows you to connect within minutes with a medical provider 24 hours a day, seven days a week via a mobile device or tablet or logging into a secure website from your computer. You can access Eleazar Sebastian from anywhere in the [place]. A virtual visit might be right for you when you have a simple condition and feel like you just dont want to get out of bed, or cant get away from work for an appointment, when your regular [employer] provider is not available (evenings, weekends or holidays), or when youre out of town and need minor care. Electronic visits cost only $49 and if the [employer] 24/7 provider determines a prescription is needed to treat your condition, one can be electronically transmitted to a nearby pharmacy*. Please take a moment to enroll today if you have not already done so. The enrollment process is free and takes just a few minutes. To enroll, please download the [employer] 24/7 ginny to your tablet or phone, or visit [email or web address] to enroll on your computer. And, as an [address] [place] [address] patient with a Webdyn account, the results of your visits will be scanned into your electronic medical record and your primary care provider will be able to view the scanned results. We urge you to continue to see your regular [employer] provider for your ongoing medical care. And while your primary care provider may not be the one available when you seek a Eleazar Nowakfin virtual visit, the peace of mind you get from getting a real diagnosis real time can be priceless. For more information on Eleazar [email or web address], view our Frequently Asked Questions (FAQs) at [email or web address]. Sincerely, 
 
Frannie Noonan MD 
Chief Medical Officer [employer] *:  certain medications cannot be prescribed via [employer]inc software Providers Seen During Your Hospitalization Provider Specialty Primary office phone Karlene Nash MD Orthopedic Surgery [phone] Your Primary Care Physician (PCP) Primary Care Physician Office Phone Office Fax [place], [address] [phone] You are allergic to the following Allergen Reactions Azithromycin Shortness of Breath Heart slows down Heart slows down Tetanus Toxoid, Adsorbed Shortness of Breath Adhesive Tape-Silicones Rash Codeine Hives N/V 
N/V  
    
 Dilaudid (Hydromorphone (Bulk)) Nausea and Vomiting Dilaudid (Hydromorphone) Unknown (comments) Other reaction(s): Unknown (comments) Ivp Dye (Fd And C Blue No.1) Hives SWELLING Metrizamide Hives Novocain (Procaine) Unknown (comments) Other reaction(s): Unknown (comments) Sectral (Acebutolol) Hives Swelling Sulfa (Sulfonamide Antibiotics) Nausea and Vomiting And hives Sulfasalazine Hives Talwin (Pentazocine Lactate) Swelling Tetanus Vaccines And Toxoid Shortness of Breath Vasotec (Enalapril Maleate) Other (comments) ELEVATED BP Recent Documentation Height Weight BMI OB Status Smoking Status 1.575 m 73.5 kg 29.63 kg/m2 Hysterectomy Never Smoker Emergency Contacts Name Discharge Info Relation Home Work Mobile Belgica Grady DISCHARGE CAREGIVER [3] Daughter [21] [phone] Patient Belongings The following personal items are in your possession at time of discharge: 
  Dental Appliances:  (lower partial returned and pt placed in her mouth in pacu)  Visual Aid: Glasses, With patient      Home Medications: None   Jewelry: None  Clothing:  (clothing bag placed on pt's stretcher in pacu)    Other Valuables: None Discharge Instructions Attachments/References MEFS - ONDANSETRON (ZOFRAN, ZOFRAN ODT, ZUPLENZ) - (BY MOUTH, INTO THE MOUTH) (ENGLISH) MEFS - OXYCODONE, RAPID RELEASE (ETH-OXYDOSE, OXY IR, ROXICODONE) - (BY MOUTH) (ENGLISH) Patient Handouts Ondansetron (Zofran, Zofran ODT, Zuplenz) - (By mouth, Into the mouth) Why this medicine is used:  
Prevents nausea and vomiting. Contact a nurse or doctor right away if you have: 
· Fast, pounding, or uneven heartbeat · Lightheadedness or fainting · Trouble breathing Common side effects: 
· Headache, tiredness · Constipation, diarrhea © 2017 [facility] Information is for End User's use only and may not be sold, redistributed or otherwise used for commercial purposes. Oxycodone, Rapid Release (ETH-Oxydose, Oxy IR, Roxicodone) - (By mouth) Why this medicine is used:  
Treats moderate to severe pain. This medicine is a narcotic pain reliever. Contact a nurse or doctor right away if you have: 
· Fast or slow heart beat, shallow breathing, blue lips, skin or fingernails · Anxiety, restlessness, fever, sweating, muscle spasms, twitching, seeing or hearing things that are not there · Extreme weakness, shallow breathing, slow heartbeat · Severe confusion, lightheadedness, dizziness, fainting · Sweating or cold, clammy skin, seizures · Severe constipation, stomach pain, nausea, vomiting Common side effects: · Mild constipation · Sleepiness, tiredness © 2017 [facility] Information is for End User's use only and may not be sold, redistributed or otherwise used for commercial purposes. Please provide this summary of care documentation to your next provider. Signatures-by signing, you are acknowledging that this After Visit Summary has been reviewed with you and you have received a copy. Patient Signature:  ____________________________________________________________ Date:  ____________________________________________________________  
  
Arna Bridget Provider Signature:  ____________________________________________________________ Date:  ____________________________________________________________

## 2018-05-15 NOTE — ANESTHESIA PROCEDURE NOTES
Spinal Block    Start time: 5/15/2018 7:35 AM  End time: 5/15/2018 7:39 AM  Performed by: Ashley Aviles  Authorized by: Ashley Aviles     Pre-procedure:   Indications: primary anesthetic  Preanesthetic Checklist: patient identified, risks and benefits discussed, anesthesia consent, site marked, patient being monitored and timeout performed    Timeout Time: 07:35          Spinal Block:   Patient Position:  Seated  Prep Region:  Lumbar  Prep: Betadine and patient draped      Location:  L3-4  Technique:  Single shot  Local:  Lidocaine 1%      Needle:   Needle Type:  Pencil-tip  Needle Gauge:  25 G  Attempts:  1      Events: CSF confirmed, no blood with aspiration and no paresthesia        Assessment:  Insertion:  Uncomplicated  Patient tolerance:  Patient tolerated the procedure well with no immediate complications

## 2018-05-15 NOTE — BRIEF OP NOTE
BRIEF OPERATIVE NOTE    Date of Procedure: 5/15/2018   Preoperative Diagnosis: OSTEOARTHRITIS RIGHT KNEE  Postoperative Diagnosis: OSTEOARTHRITIS RIGHT KNEE    Procedure(s):  RIGHT TOTAL KNEE REPLACEMENT  (SPINAL W/IV SED)  Surgeon(s) and Role:     * Arthur Madison MD - Primary         Surgical Assistant: Franky Sanchez SA    Surgical Staff:  Circ-1: Tracey Rodriguez RN  Circ-Relief: John Dowling RN  Scrub RN-1: Alice Sommer RN  Retractor Potts: Feroz Alberto  Surg Asst-1: Jorge Huber  Event Time In   Incision Start 8853   Incision Close      Anesthesia: General   Estimated Blood Loss: 100  Specimens: * No specimens in log *   Findings: severe djd   Complications: none  Implants:   Implant Name Type Inv.  Item Serial No.  Lot No. LRB No. Used Action   CEMENT BNE GENTAMC MV 40GM -- [employer] - SNA  CEMENT BNE GENTAMC MV 40GM -- SMART[employer] ENDURANCE NA [employer] [id number] Right 2 Implanted   FEM PS LAKHWINDER CCR STD SZ 7 RT -- PERSONA - SNA  FEM PS LAKHWINDER CCR STD SZ 7 RT -- PERSONA [employer] [id number] Right 1 Implanted   TIB PRN NP STM 5 DEG SZ ER -- PERSONA - SNA  TIB PRN NP STM 5 DEG SZ ER -- PERSONA [employer] [id number] Right 1 Implanted   INSERT ALL POLY PAT PLY 32MM -- PERSONA - SNA  INSERT ALL POLY PAT PLY 32MM -- PERSONA [employer] [id number] Right 1 Implanted   INSERT ASF PS 10MM VE R 6-9 EF -- PERSONA - SNA   INSERT ASF PS 10MM VE R 6-9 EF -- PERSONA [employer] [id number] Right 1 Implanted

## 2018-05-15 NOTE — IP AVS SNAPSHOT
[address] [facility] [address] [place] [address] 
[phone] Patient: Bere Dover MRN: [id number] A check jovani indicates which time of day the medication should be taken. My Medications START taking these medications Instructions Each Dose to Equal  
 Morning Noon Evening Bedtime  
 ondansetron 4 mg disintegrating tablet Commonly known as:  ZOFRAN ODT Your last dose was: Your next dose is: Take 0.5 Tabs by mouth every eight (8) hours as needed for Nausea. Notify your doctor if having any abdominal pain or distention. Indications: Postop Nausea  
 2 mg  
    
   
   
   
  
 oxyCODONE IR 5 mg immediate release tablet Commonly known as:  Conchetta Ronda Your last dose was: Your next dose is: Take 0.5-1 Tabs by mouth every four (4) hours as needed. Max Daily Amount: 30 mg. Indications: Pain 2.5-5 mg  
    
   
   
   
  
 senna-docusate 8.6-50 mg per tablet Commonly known as:  Seth Nunez Your last dose was: Your next dose is: Take 1 Tab by mouth two (2) times a day. Indications: constipation 1 Tab CHANGE how you take these medications Instructions Each Dose to Equal  
 Morning Noon Evening Bedtime  
 acetaminophen 500 mg tablet Commonly known as:  TYLENOL What changed:   
- medication strength - when to take this 
- reasons to take this Your last dose was: Your next dose is: Take 1 Tab by mouth every four (4) hours (while awake). Indications: Pain 500 mg  
    
   
   
   
  
 aspirin delayed-release 81 mg tablet What changed:   
- how much to take - when to take this 
- additional instructions Your last dose was: Your next dose is: Take 1 Tab by mouth two (2) times a day. For blood clot prevention.   
 81 mg  
    
   
   
   
  
  
 CONTINUE taking these medications Instructions Each Dose to Equal  
 Morning Noon Evening Bedtime ADVIL 200 mg tablet Generic drug:  ibuprofen Your last dose was: Your next dose is: Take 200 mg by mouth every six (6) hours as needed for Pain. 200 mg  
    
   
   
   
  
 albuterol 90 mcg/actuation inhaler Commonly known as:  PROVENTIL HFA, VENTOLIN HFA, PROAIR HFA Your last dose was: Your next dose is: Take 2 Puffs by inhalation as needed. 2 Puff  
    
   
   
   
  
 amLODIPine 5 mg tablet Commonly known as:  Liyah Medley Your last dose was: Your next dose is: Take 1 Tab by mouth daily. 5 mg CLARITIN 10 mg tablet Generic drug:  loratadine Your last dose was: Your next dose is: Take 10 mg by mouth daily. 10 mg  
    
   
   
   
  
 hydroCHLOROthiazide 25 mg tablet Commonly known as:  HYDRODIURIL Your last dose was: Your next dose is: Take 1 Tab by mouth daily. 25 mg MIRALAX 17 gram packet Generic drug:  polyethylene glycol Your last dose was: Your next dose is: Take 17 g by mouth daily. 17 g MULTIPLE VITAMINS PO Your last dose was: Your next dose is: Take  by mouth daily. nebivolol 20 mg tablet Commonly known as:  BYSTOLIC Your last dose was: Your next dose is: Take 1 Tab by mouth daily. 20 mg  
    
   
   
   
  
 rosuvastatin 10 mg tablet Commonly known as:  CRESTOR Your last dose was: Your next dose is: Take 1 Tab by mouth nightly. 10 mg  
    
   
   
   
  
 VITAMIN B-12 1,000 mcg/mL injection Generic drug:  cyanocobalamin Your last dose was: Your next dose is: 1,000 mcg by IntraMUSCular route once. MONTHLY, APPROX 3/2018  
 1000 mcg Where to Get Your Medications These medications were sent to [address] [place] [address], [address] [place] [address]. [address] Phone:  [phone]  
  acetaminophen 500 mg tablet Information on where to get these meds will be given to you by the nurse or doctor. ! Ask your nurse or doctor about these medications  
  aspirin delayed-release 81 mg tablet  
 ondansetron 4 mg disintegrating tablet  
 oxyCODONE IR 5 mg immediate release tablet  
 senna-docusate 8.6-50 mg per tablet

## 2018-05-16 ENCOUNTER — HOME HEALTH ADMISSION (OUTPATIENT)
Dept: HOME HEALTH SERVICES | Facility: HOME HEALTH | Age: 78
End: 2018-05-16
Payer: MEDICARE

## 2018-05-16 LAB
ANION GAP SERPL CALC-SCNC: 11 MMOL/L (ref 5–15)
BUN SERPL-MCNC: 16 MG/DL (ref 6–20)
BUN/CREAT SERPL: 18 (ref 12–20)
CALCIUM SERPL-MCNC: 8.7 MG/DL (ref 8.5–10.1)
CHLORIDE SERPL-SCNC: 110 MMOL/L (ref 97–108)
CO2 SERPL-SCNC: 21 MMOL/L (ref 21–32)
CREAT SERPL-MCNC: 0.88 MG/DL (ref 0.55–1.02)
GLUCOSE SERPL-MCNC: 195 MG/DL (ref 65–100)
HGB BLD-MCNC: 9.7 G/DL (ref 11.5–16)
POTASSIUM SERPL-SCNC: 4 MMOL/L (ref 3.5–5.1)
SODIUM SERPL-SCNC: 142 MMOL/L (ref 136–145)

## 2018-05-16 PROCEDURE — 85018 HEMOGLOBIN: CPT | Performed by: ORTHOPAEDIC SURGERY

## 2018-05-16 PROCEDURE — 97116 GAIT TRAINING THERAPY: CPT

## 2018-05-16 PROCEDURE — 65270000029 HC RM PRIVATE

## 2018-05-16 PROCEDURE — 36415 COLL VENOUS BLD VENIPUNCTURE: CPT | Performed by: ORTHOPAEDIC SURGERY

## 2018-05-16 PROCEDURE — 74011250637 HC RX REV CODE- 250/637: Performed by: ORTHOPAEDIC SURGERY

## 2018-05-16 PROCEDURE — G8988 SELF CARE GOAL STATUS: HCPCS | Performed by: OCCUPATIONAL THERAPIST

## 2018-05-16 PROCEDURE — 74011250636 HC RX REV CODE- 250/636: Performed by: ORTHOPAEDIC SURGERY

## 2018-05-16 PROCEDURE — 97165 OT EVAL LOW COMPLEX 30 MIN: CPT | Performed by: OCCUPATIONAL THERAPIST

## 2018-05-16 PROCEDURE — G8987 SELF CARE CURRENT STATUS: HCPCS | Performed by: OCCUPATIONAL THERAPIST

## 2018-05-16 PROCEDURE — 97110 THERAPEUTIC EXERCISES: CPT

## 2018-05-16 PROCEDURE — 97535 SELF CARE MNGMENT TRAINING: CPT | Performed by: OCCUPATIONAL THERAPIST

## 2018-05-16 PROCEDURE — 80048 BASIC METABOLIC PNL TOTAL CA: CPT | Performed by: ORTHOPAEDIC SURGERY

## 2018-05-16 RX ORDER — AMOXICILLIN 250 MG
1 CAPSULE ORAL 2 TIMES DAILY
Qty: 100 TAB | Refills: 0 | Status: SHIPPED
Start: 2018-05-16 | End: 2018-09-25

## 2018-05-16 RX ORDER — ACETAMINOPHEN 500 MG
500 TABLET ORAL
Qty: 100 TAB | Refills: 0 | Status: SHIPPED | OUTPATIENT
Start: 2018-05-16 | End: 2018-09-25

## 2018-05-16 RX ORDER — OXYCODONE HYDROCHLORIDE 5 MG/1
2.5-5 TABLET ORAL
Qty: 45 TAB | Refills: 0 | Status: SHIPPED | OUTPATIENT
Start: 2018-05-16 | End: 2018-09-25

## 2018-05-16 RX ORDER — ASPIRIN 81 MG/1
81 TABLET ORAL 2 TIMES DAILY
Qty: 60 TAB | Refills: 0 | Status: SHIPPED | OUTPATIENT
Start: 2018-05-16 | End: 2018-05-16

## 2018-05-16 RX ORDER — ASPIRIN 81 MG/1
81 TABLET ORAL 2 TIMES DAILY
Qty: 60 TAB | Refills: 0 | Status: SHIPPED | OUTPATIENT
Start: 2018-05-16 | End: 2018-09-25

## 2018-05-16 RX ADMIN — LORATADINE 10 MG: 10 TABLET ORAL at 10:09

## 2018-05-16 RX ADMIN — Medication 10 ML: at 13:33

## 2018-05-16 RX ADMIN — Medication 5 ML: at 22:00

## 2018-05-16 RX ADMIN — STANDARDIZED SENNA CONCENTRATE AND DOCUSATE SODIUM 1 TABLET: 8.6; 5 TABLET, FILM COATED ORAL at 17:19

## 2018-05-16 RX ADMIN — ACETAMINOPHEN 500 MG: 500 TABLET, FILM COATED ORAL at 06:29

## 2018-05-16 RX ADMIN — ACETAMINOPHEN 500 MG: 500 TABLET, FILM COATED ORAL at 10:09

## 2018-05-16 RX ADMIN — OXYCODONE HYDROCHLORIDE 2.5 MG: 5 TABLET ORAL at 10:10

## 2018-05-16 RX ADMIN — KETOROLAC TROMETHAMINE 15 MG: 30 INJECTION, SOLUTION INTRAMUSCULAR at 06:29

## 2018-05-16 RX ADMIN — Medication 10 ML: at 06:29

## 2018-05-16 RX ADMIN — AMLODIPINE BESYLATE 5 MG: 5 TABLET ORAL at 10:11

## 2018-05-16 RX ADMIN — OXYCODONE HYDROCHLORIDE 2.5 MG: 5 TABLET ORAL at 18:28

## 2018-05-16 RX ADMIN — OXYCODONE HYDROCHLORIDE 5 MG: 5 TABLET ORAL at 21:24

## 2018-05-16 RX ADMIN — ASPIRIN 81 MG: 81 TABLET, COATED ORAL at 21:24

## 2018-05-16 RX ADMIN — ACETAMINOPHEN 500 MG: 500 TABLET, FILM COATED ORAL at 13:33

## 2018-05-16 RX ADMIN — OXYCODONE HYDROCHLORIDE 2.5 MG: 5 TABLET ORAL at 13:33

## 2018-05-16 RX ADMIN — NEBIVOLOL HYDROCHLORIDE 20 MG: 5 TABLET ORAL at 10:10

## 2018-05-16 RX ADMIN — ROSUVASTATIN CALCIUM 10 MG: 10 TABLET, FILM COATED ORAL at 21:24

## 2018-05-16 RX ADMIN — ASPIRIN 81 MG: 81 TABLET, COATED ORAL at 10:10

## 2018-05-16 RX ADMIN — ACETAMINOPHEN 500 MG: 500 TABLET, FILM COATED ORAL at 21:24

## 2018-05-16 RX ADMIN — STANDARDIZED SENNA CONCENTRATE AND DOCUSATE SODIUM 1 TABLET: 8.6; 5 TABLET, FILM COATED ORAL at 10:11

## 2018-05-16 RX ADMIN — ACETAMINOPHEN 500 MG: 500 TABLET, FILM COATED ORAL at 17:19

## 2018-05-16 RX ADMIN — OXYCODONE HYDROCHLORIDE 2.5 MG: 5 TABLET ORAL at 17:19

## 2018-05-16 RX ADMIN — POLYETHYLENE GLYCOL 3350 17 G: 17 POWDER, FOR SOLUTION ORAL at 10:10

## 2018-05-16 NOTE — PROGRESS NOTES
Care Management Interventions  PCP Verified by CM: Yes Kirstie Palm MD)  Palliative Care Criteria Met (RRAT>21 & CHF Dx)?: No  Mode of Transport at Discharge: Other (see comment) (family)  Transition of Care Consult (CM Consult): Home Health, Discharge [address]: Yes  MyChart Signup: No  Discharge Durable Medical Equipment: No (patient owns walker)  Health Maintenance Reviewed: Yes  Physical Therapy Consult: Yes  Occupational Therapy Consult: Yes  Speech Therapy Consult: No  Current Support Network: Own Home, Family Lives Nearby, Lives Alone (patient's sister lives next door and her children will also be available to assist)  Confirm Follow Up Transport: Family  Plan discussed with Pt/Family/Caregiver: Yes  Freedom of Choice Offered: Yes  Discharge Location  Discharge Placement: Home with home health     Reason for Admission:  RIGHT TOTAL KNEE REPLACEMENT                      RRAT Score: 8                    Plan for utilizing home health: {atient prefers [facility]. Referral sent via [facility]. Likelihood of Readmission:  Low. Transition of Care Plan: home with home health. [facility] has accepted case.  CM updated AVS.    No Perla, BSW/CRM

## 2018-05-16 NOTE — PROGRESS NOTES
Minimal pain. No cp/sob.  No n/v.    Visit Vitals    /65 (BP 1 Location: Right arm, BP Patient Position: At rest)    Pulse 67    Temp 98.3 °F (36.8 °C)    Resp 16    Ht 5' 2\" (1.575 m)    Wt 73.5 kg (162 lb)    SpO2 98%    BMI 29.63 kg/m2         Alert   R knee dressing dry  Calves soft NT  Motor 5/5  Pulses symmetrical    Recent Results (from the past 12 hour(s))   METABOLIC PANEL, BASIC    Collection Time: 05/16/18  3:35 AM   Result Value Ref Range    Sodium 142 136 - 145 mmol/L    Potassium 4.0 3.5 - 5.1 mmol/L    Chloride 110 (H) 97 - 108 mmol/L    CO2 21 21 - 32 mmol/L    Anion gap 11 5 - 15 mmol/L    Glucose 195 (H) 65 - 100 mg/dL    BUN 16 6 - 20 MG/DL    Creatinine 0.88 0.55 - 1.02 MG/DL    BUN/Creatinine ratio 18 12 - 20      GFR est AA >60 >60 ml/min/1.73m2    GFR est non-AA >60 >60 ml/min/1.73m2    Calcium 8.7 8.5 - 10.1 MG/DL   HEMOGLOBIN    Collection Time: 05/16/18  3:35 AM   Result Value Ref Range    HGB 9.7 (L) 11.5 - 16.0 g/dL         POD 1 R TKA; acute postop blood loss anemia  -ASA  -pain control  -PT  -disch home today vs tomorrow    Sandra Diaz MD

## 2018-05-16 NOTE — PROGRESS NOTES
Problem: Self Care Deficits Care Plan (Adult)  Goal: *Acute Goals and Plan of Care (Insert Text)  Occupational Therapy Goals  Initiated 5/16/2018  1. Patient will perform lower body ADLs with modified independence within 7 day(s). 2.  Patient will perform standing ADLs standing 5 mins without fatigue or LOB with modified independence within 7 day(s). 3.  Patient will perform all aspects of toileting with modified independence within 7day(s). 4.  Patient will complete toileting transfers with mod I within 7 day(s). 5.  Patient will utilize energy conservation techniques during functional activities without cues within 7 day(s). Occupational Therapy EVALUATION  Patient: Cheikh Roque (33 y.o. female)  Date: 5/16/2018  Primary Diagnosis: OSTEOARTHRITIS RIGHT KNEE  Primary localized osteoarthritis of right knee  Procedure(s) (LRB):  RIGHT TOTAL KNEE REPLACEMENT  (SPINAL W/IV SED) (Right) 1 Day Post-Op   Precautions:   Fall, WBAT    ASSESSMENT :  Based on the objective data described below, the patient presents at overall setup to stand by A level for basic ADLS. Pt functional performance is hindered by decreased endurance, mildly decreased ROM to distal LE s/p TKR, and needing increased cues for safety. Pt tending to place feet far in front of RW during amb, frequent cues to sequence steps correctly. Pt also noted to reach outside SHARAN during functional tasks, frequent cues to move closer to tasks. Pt and family also instructed in use of reacher for LE ADLs, safe hygiene with toileting and BSC over commode, as well as adaptive ADLs/IADLS in bathroom and kitchen. Pt and family indicated understanding of education. Pt will return home alone, however supportive family present. Pt will have daughters supervise her during first night and will bring over meals and clean PRN. Feel pt will continue to benefit from brief skilled OT intervention to maximize safety and functional return to home alone.   May benefit from [facility] at discharge. Patient will benefit from skilled intervention to address the above impairments. Patients rehabilitation potential is considered to be Excellent  Factors which may influence rehabilitation potential include:   []             None noted  []             Mental ability/status  [x]             Medical condition  []             Home/family situation and support systems  []             Safety awareness  []             Pain tolerance/management  []             Other:      PLAN :  Recommendations and Planned Interventions:  [x]               Self Care Training                  [x]        Therapeutic Activities  []               Functional Mobility Training    []        Cognitive Retraining  [x]               Therapeutic Exercises           [x]        Endurance Activities  []               Balance Training                   []        Neuromuscular Re-Education  []               Visual/Perceptual Training     [x]   Home Safety Training  [x]               Patient Education                 []        Family Training/Education  []               Other (comment):    Frequency/Duration: Patient will be followed by occupational therapy 5 times a week to address goals. Discharge Recommendations: Home Health  Further Equipment Recommendations for Discharge: ?shower chair     SUBJECTIVE:   Patient stated I know I'm high strung.     OBJECTIVE DATA SUMMARY:   HISTORY:   Past Medical History:   Diagnosis Date    Arthritis     Asthma     albuterol    CAD (coronary artery disease)     Elevated glucose     borderline elevated A1c    GERD (gastroesophageal reflux disease)     Hypercholesterolemia     Hypertension     Nausea & vomiting     MALINI on CPAP      Past Surgical History:   Procedure Laterality Date    ECHO STRESS  2007    normal resting LV wall motion and no evidence of ischemia. The ejection fraction was 50-60%.  HOLTER MONITOR  2007    frequent ventricular extrasystoles.  Rare couplets and triplets. NSR during diary entries.  HX HYSTERECTOMY  1986    HX ORTHOPAEDIC  1996    Right ROTATER CUFF SURGERY     HX ORTHOPAEDIC  2010    R KNEE MENISCUS    HX ORTHOPAEDIC  4/2013    left tibia repair    HX ORTHOPAEDIC  03/2012    LEFT ROTATER CUFF REPAIR     HX ORTHOPAEDIC  03/2009    THUMB TRIGGER FINGER RELEASE    HX OTHER SURGICAL      CARPAL TUNNEL BILATERAL, TRIGGER THUMB RELEASE    HX OTHER SURGICAL      Carotid dopplers 11/11- mild disease bilat    HX OTHER SURGICAL      Lexiscan Cardiolite 8/23/12 - normal; LVEF 65%    HX OTHER SURGICAL      Echo 8/23/12 - TDS, LVEF 60%, no sig valve disease, RVSP 25    HX OTHER SURGICAL      Carotid Duplex 3/14/13 - 10-49% stenosis bilat     HX OTHER SURGICAL      LE ZE's 3/14/13 - normal ZE's        Prior Level of Function/Environment/Context: Pt lives alone, I with ADLs, IADLs, active, driving, supportive family in town. Occupations in which the patient is/was successful, what are the barriers preventing that success:   Performance Patterns (routines, roles, habits, and rituals):   Personal Interests and/or values:   Expanded or extensive additional review of patient history:     Home Situation  Home Environment: Private residence  # Steps to Enter: 2  Rails to Enter: No  One/Two Story Residence: One story  Living Alone: Yes  Support Systems: Child(chantell)  Patient Expects to be Discharged to[de-identified] Private residence  Current DME Used/Available at Home: Adaptive dressing aides, Cane, straight, Commode, bedside, Grab bars (PT to order RW)  Tub or Shower Type: Tub/Shower combination  [x]  Right hand dominant   []  Left hand dominant    EXAMINATION OF PERFORMANCE DEFICITS:  Cognitive/Behavioral Status:  Neurologic State: Alert  Orientation Level: Appropriate for age  Cognition: Appropriate decision making; Follows commands  Perception: Appears intact  Perseveration: No perseveration noted  Safety/Judgement: Awareness of environment; Fall prevention    Skin: UE intact    Edema: UE intact    Hearing: Auditory  Auditory Impairment: None    Vision/Perceptual:                           Acuity: Within Defined Limits         Range of Motion:    AROM: Generally decreased, functional  PROM: Generally decreased, functional                      Strength:    Strength: Generally decreased, functional                Coordination:     Fine Motor Skills-Upper: Left Intact; Right Intact    Gross Motor Skills-Upper: Left Intact; Right Intact    Tone & Sensation:  UE intact                            Balance:  Sitting: Intact  Standing: Intact; With support    Functional Mobility and Transfers for ADLs:  Bed Mobility:  Sit to Supine: Contact guard assistance; Additional time (verbal cues)    Transfers:  Sit to Stand: Contact guard assistance  Stand to Sit: Contact guard assistance  Toilet Transfer : Stand-by assistance    ADL Assessment:  Feeding: Setup    Oral Facial Hygiene/Grooming: Setup    Bathing: Contact guard assistance (for balance in standing with albina care)    Upper Body Dressing: Setup    Lower Body Dressing: Contact guard assistance    Toileting: Stand by assistance  Meal Preparation:  (reviewed compensatory strategies)             ADL Intervention and task modifications:     Reviewed home safety, fall prevention, adaptive ADLs, adaptive IADLs, and RW safety with pt and family. All indicated understanding of education. Grooming  Grooming Assistance: Stand-by assistance  Washing Hands: Stand-by assistance; Compensatory technique training  Cues: Verbal cues provided              Upper Body Dressing Assistance  Dressing Assistance: [address] Gown: Supervision/ set-up  Pullover Shirt: Supervision/set-up; Compensatory technique training  Cues: Verbal cues provided    Lower Body Dressing Assistance  Dressing Assistance: Stand-by assistance  Underpants: Stand-by assistance; Compensatory technique training (for balance while managing over hips)  Pants With Elastic Waist: Stand-by assistance; Compensatory technique training  Socks: Supervision/set-up; Compensatory technique training  Leg Crossed Method Used: No  Position Performed: Seated in chair;Standing  Cues: Verbal cues provided  Adaptive Equipment Used: Reacher (reviewed use of reacher if pants were to fall in standing)    Toileting  Toileting Assistance: Stand-by assistance  Bladder Hygiene: Stand-by assistance; Compensatory technique training  Bowel Hygiene: Stand-by assistance; Compensatory technique training  Clothing Management: Stand-by assistance  Cues: Verbal cues provided  Adaptive Equipment: Elevated seat;Grab bars; Walker    Cognitive Retraining  Safety/Judgement: Awareness of environment; Fall prevention      Functional Measure:  Barthel Index:    Bathin  Bladder: 10  Bowels: 10  Groomin  Dressin  Feeding: 10  Mobility: 10  Stairs: 5  Toilet Use: 5  Transfer (Bed to Chair and Back): 10  Total: 75       Barthel and G-code impairment scale:  Percentage of impairment CH  0% CI  1-19% CJ  20-39% CK  40-59% CL  60-79% CM  80-99% CN  100%   Barthel Score 0-100 100 99-80 79-60 59-40 20-39 1-19   0   Barthel Score 0-20 20 17-19 13-16 9-12 5-8 1-4 0      The Barthel ADL Index: Guidelines  1. The index should be used as a record of what a patient does, not as a record of what a patient could do. 2. The main aim is to establish degree of independence from any help, physical or verbal, however minor and for whatever reason. 3. The need for supervision renders the patient not independent. 4. A patient's performance should be established using the best available evidence. Asking the patient, friends/relatives and nurses are the usual sources, but direct observation and common sense are also important. However direct testing is not needed. 5. Usually the patient's performance over the preceding 24-48 hours is important, but occasionally longer periods will be relevant.   6. Middle categories imply that the patient supplies over 50 per cent of the effort. 7. Use of aids to be independent is allowed. Dinh Alcantar., Barthel, D.W. (9982). Functional evaluation: the Barthel Index. [address] (14)2. KENDAL Perea, Ludmila Morris., Bryan Sinks., Marianne, [address] (1999). Measuring the change indisability after inpatient rehabilitation; comparison of the responsiveness of the Barthel Index and Functional [place] Measure. Journal of Neurology, Neurosurgery, and Psychiatry, 66(4), 960-913. Nallely Wilson NAKIRA.A, GINGER West, & Basilio Acuña M.A. (2004.) Assessment of post-stroke quality of life in cost-effectiveness studies: The usefulness of the Barthel Index and the EuroQoL-5D. Quality of Life Research, 13, 330-00         G codes: In compliance with CMSs Claims Based Outcome Reporting, the following G-code set was chosen for this patient based on their primary functional limitation being treated: The outcome measure chosen to determine the severity of the functional limitation was the Barthel with a score of 75/100 which was correlated with the impairment scale. ? Self Care:     - CURRENT STATUS: CJ - 20%-39% impaired, limited or restricted    - GOAL STATUS: CI - 1%-19% impaired, limited or restricted    - D/C STATUS:  ---------------To be determined---------------     Occupational Therapy Evaluation Charge Determination   History Examination Decision-Making   LOW Complexity : Brief history review  LOW Complexity : 1-3 performance deficits relating to physical, cognitive , or psychosocial skils that result in activity limitations and / or participation restrictions  MEDIUM Complexity : Patient may present with comorbidities that affect occupational performnce.  Miniml to moderate modification of tasks or assistance (eg, physical or verbal ) with assesment(s) is necessary to enable patient to complete evaluation       Based on the above components, the patient evaluation is determined to be of the following complexity level: LOW   Pain:  Pain Scale 1: Numeric (0 - 10)  Pain Intensity 1: 5  Pain Location 1: Knee  Pain Orientation 1: Right  Pain Description 1: Aching  Pain Intervention(s) 1: Medication (see MAR)  Activity Tolerance:   NAD throughout session. Please refer to the flowsheet for vital signs taken during this treatment. After treatment:   [] Patient left in no apparent distress sitting up in chair  [x] Patient left in no apparent distress in bed  [x] Call bell left within reach  [] Nursing notified  [x] Caregiver present  [] Bed alarm activated    COMMUNICATION/EDUCATION:   The patients plan of care was discussed with: Physical Therapist and Registered Nurse. [x] Home safety education was provided and the patient/caregiver indicated understanding. [x] Patient/family have participated as able in goal setting and plan of care. [] Patient/family agree to work toward stated goals and plan of care. [] Patient understands intent and goals of therapy, but is neutral about his/her participation. [] Patient is unable to participate in goal setting and plan of care. This patients plan of care is appropriate for delegation to [facility].     Thank you for this referral.  Errol Pillai, OTR/L  Time Calculation: 38 mins

## 2018-05-16 NOTE — PROGRESS NOTES
Problem: Mobility Impaired (Adult and Pediatric)  Goal: *Acute Goals and Plan of Care (Insert Text)  Physical Therapy Goals  Initiated 5/15/2018    1. Patient will move from supine to sit and sit to supine , scoot up and down and roll side to side in bed with modified independence within 4 days. 2. Patient will perform sit to stand with modified independence within 4 days. 3. Patient will ambulate with modified independence for 150 feet with the least restrictive device within 4 days. 4. Patient will ascend/descend 2 stairs with SPC with modified independence within 4 days. 5. Patient will perform home exercise program per protocol with modified independence within 4 days. 6. Patient will demonstrate AROM 0-90 degrees in operative joint within 4 days. physical Therapy TREATMENT  Patient: James Molina (28 y.o. female)  Date: 5/16/2018  Diagnosis: OSTEOARTHRITIS RIGHT KNEE  Primary localized osteoarthritis of right knee Primary localized osteoarthritis of right knee  Procedure(s) (LRB):  RIGHT TOTAL KNEE REPLACEMENT  (SPINAL W/IV SED) (Right) 1 Day Post-Op  Precautions: Fall, WBAT  Chart, physical therapy assessment, plan of care and goals were reviewed. ASSESSMENT:  Patient c/o additional pain for the pm but able to participate well in intervention. ROM improving appropriately. She remains challenged with correction of gait deviations without over-correction. Provided cues to improve right knee flexion during swing and to avoid scapular elevation with improved quality compared to am session. Vania and walker management improved with distance. The patient's daughter was present for pm session and discussed plan for discharge likely tomorrow. Will plan to assess stairs tomorrow am and likely clear for discharge home with HHPT.   Progression toward goals:  [x]      Improving appropriately and progressing toward goals  []      Improving slowly and progressing toward goals  []      Not making progress toward goals and plan of care will be adjusted     PLAN:  Patient continues to benefit from skilled intervention to address the above impairments. Continue treatment per established plan of care. Discharge Recommendations:  Home Health  Further Equipment Recommendations for Discharge:  to be determined       SUBJECTIVE:   Patient stated the block is wearing off in my knee.     OBJECTIVE DATA SUMMARY:   Critical Behavior:  Neurologic State: Alert  Orientation Level: Appropriate for age  Cognition: Appropriate decision making, Follows commands  Safety/Judgement: Awareness of environment, Fall prevention  Range of Motion:  AROM: Generally decreased, functional  PROM: Generally decreased, functional  RLE Assessment (WDL): Exceptions to WDL     RLE PROM  R Knee Flexion: 85  R Knee Extension: -5           Functional Mobility Training:  Bed Mobility:        Sit to Supine: Contact guard assistance; Additional time (verbal cues)           Transfers:  Sit to Stand: Contact guard assistance  Stand to Sit: Contact guard assistance                             Balance:  Sitting: Intact  Standing: Intact; With support  Ambulation/Gait Training:  Distance (ft): 150 Feet (ft)  Assistive Device: Gait belt;Walker, rolling  Ambulation - Level of Assistance: Contact guard assistance        Gait Abnormalities: Antalgic;Decreased step clearance;Trunk sway increased  Right Side Weight Bearing: As tolerated     Base of Support: Shift to left  Stance: Right decreased  Speed/Vania: Pace decreased (<100 feet/min)  Step Length: Left shortened                    Stairs:            Therapeutic Exercises:     EXERCISE   Sets   Reps   Active Active Assist   Passive Self ROM   Comments   Ankle Pumps 1 10 [x]                                        []                                        []                                        []                                           Quad Sets 1 10 [x]                                        [] []                                        []                                           Hamstring Sets 1 10 [x]                                        []                                        []                                        []                                           Short Arc Quads 1 10 [x]                                        []                                        []                                        []                                           Knee Extension Stretch 1 120 sec   []                                          []                                          [x]                                          []                                           Heel Slides 1 10 [x]                                        []                                        []                                        []                                           Long Arc Quads   []                                        []                                        []                                        []                                           Knee Flexion Stretch   []                                        []                                        []                                        []                                           Straight Leg Raises   []                                        []                                        []                                        []                                             Pain:  Pain Scale 1: Numeric (0 - 10)  Pain Intensity 1: 3  Pain Location 1: Knee  Pain Orientation 1: Right  Pain Description 1: Aching  Pain Intervention(s) 1: Ice  .   After treatment:   [] Patient left in no apparent distress sitting up in chair  [x] Patient left in no apparent distress in bed  [x] Call bell left within reach  [x] Nursing notified  [] Caregiver present  [] Bed alarm activated    COMMUNICATION/COLLABORATION:   The patients plan of care was discussed with: Registered Nurse    Eusebia Hyatt PT, DPT   Time Calculation: 28 mins

## 2018-05-16 NOTE — DISCHARGE SUMMARY
[email or web address] [address] [place] [id number]    DISCHARGE SUMMARY     Patient: [facility] Record Number: [id number]                : 1940  Age: 68 y.o. Admit Date: 5/15/2018  Discharge Date:   Admission Diagnosis: OSTEOARTHRITIS RIGHT KNEE  Primary localized osteoarthritis of right knee  Discharge Diagnosis: OSTEOARTHRITIS RIGHT KNEE  Procedures: Procedure(s):  RIGHT TOTAL KNEE REPLACEMENT  (SPINAL W/IV SED)  Surgeon: Alberto Bennett MD  Anesthesia: spinal  Complications: None     History of Present Illness: The patient is a 80-year-old female with progressive right knee pain due to severe tricompartmental arthritis. Symptoms have progressed despite comprehensive conservative treatment. She presents for right total knee replacement. Risks, benefits and alternatives of the procedure were reviewed with her in detail. She desires to proceed. Hospital Course:  Kristy Eden tolerated the procedure well. She was transferred  to the recovery room in stable condition. After a brief stay the patient was then transferred to the Joint Replacement Unit at [address] [place] [address].  On postoperative day #1, the dressing was clean and dry, she was neurovascularly intact. The patient was afebrile and vital signs were stable. Calves were soft and non-tender bilaterally. On postoperative day  # 2, the patient was tolerating a regular diet and making satisfactory progress with physical therapy. She was discharged to Home in stable condition on postoperative day 2. She was provided with routine postoperative instructions and advised to follow up in my office in 3 weeks following discharge from the hospital.  She was prescribed aspirin for DVT prophylaxis and tylenol and low dose oxycodone for post-operative pain.     Discharge Medications:  Current Discharge Medication List      START taking these medications    Details   oxyCODONE IR (ROXICODONE) 5 mg immediate release tablet Take 0.5-1 Tabs by mouth every four (4) hours as needed. Max Daily Amount: 30 mg. Indications: Pain  Qty: 45 Tab, Refills: 0    Associated Diagnoses: Status post right knee replacement      senna-docusate (PERICOLACE) 8.6-50 mg per tablet Take 1 Tab by mouth two (2) times a day. Indications: constipation  Qty: 100 Tab, Refills: 0         CONTINUE these medications which have CHANGED    Details   acetaminophen (TYLENOL) 500 mg tablet Take 1 Tab by mouth every four (4) hours (while awake). Indications: Pain  Qty: 100 Tab, Refills: 0      aspirin delayed-release 81 mg tablet Take 1 Tab by mouth two (2) times a day. For blood clot prevention. Qty: 60 Tab, Refills: 0         CONTINUE these medications which have NOT CHANGED    Details   amLODIPine (NORVASC) 5 mg tablet Take 1 Tab by mouth daily. Qty: 90 Tab, Refills: 3    Associated Diagnoses: Essential hypertension; Hypercholesterolemia      nebivolol (BYSTOLIC) 20 mg tablet Take 1 Tab by mouth daily. Qty: 30 Tab, Refills: 12      ibuprofen (ADVIL) 200 mg tablet Take 200 mg by mouth every six (6) hours as needed for Pain.      hydroCHLOROthiazide (HYDRODIURIL) 25 mg tablet Take 1 Tab by mouth daily. Qty: 90 Tab, Refills: 1    Associated Diagnoses: Essential hypertension; Hypercholesterolemia; Bilateral carotid artery disease (HCC)      MULTIVITAMIN (MULTIPLE VITAMINS PO) Take  by mouth daily. loratadine (CLARITIN) 10 mg tablet Take 10 mg by mouth daily. polyethylene glycol (MIRALAX) 17 gram packet Take 17 g by mouth daily. rosuvastatin (CRESTOR) 10 mg tablet Take 1 Tab by mouth nightly. Qty: 90 Tab, Refills: 1    Associated Diagnoses: Essential hypertension; Hypercholesterolemia; Bilateral carotid artery disease (HCC)      albuterol (PROVENTIL HFA, VENTOLIN HFA, PROAIR HFA) 90 mcg/actuation inhaler Take 2 Puffs by inhalation as needed. Associated Diagnoses: Essential hypertension;  Hypercholesterolemia; Bilateral carotid artery disease (HCC)      cyanocobalamin (VITAMIN B-12) 1,000 mcg/mL injection 1,000 mcg by IntraMUSCular route once.  MONTHLY, APPROX 3/2018             Signed by: Immanuel Tapia MD  5/16/2018

## 2018-05-16 NOTE — PROGRESS NOTES
Bedside and Verbal shift change report given to Nadege Durand RN (oncoming nurse) by Mey Olivier RN (offgoing nurse). Report included the following information SBAR.

## 2018-05-16 NOTE — PROGRESS NOTES
Problem: Mobility Impaired (Adult and Pediatric)  Goal: *Acute Goals and Plan of Care (Insert Text)  Physical Therapy Goals  Initiated 5/15/2018    1. Patient will move from supine to sit and sit to supine , scoot up and down and roll side to side in bed with modified independence within 4 days. 2. Patient will perform sit to stand with modified independence within 4 days. 3. Patient will ambulate with modified independence for 150 feet with the least restrictive device within 4 days. 4. Patient will ascend/descend 2 stairs with SPC with modified independence within 4 days. 5. Patient will perform home exercise program per protocol with modified independence within 4 days. 6. Patient will demonstrate AROM 0-90 degrees in operative joint within 4 days. physical Therapy TREATMENT  Patient: Quan Jha (35 y.o. female)  Date: 5/16/2018  Diagnosis: OSTEOARTHRITIS RIGHT KNEE  Primary localized osteoarthritis of right knee Primary localized osteoarthritis of right knee  Procedure(s) (LRB):  RIGHT TOTAL KNEE REPLACEMENT  (SPINAL W/IV SED) (Right) 1 Day Post-Op  Precautions: WBAT  Chart, physical therapy assessment, plan of care and goals were reviewed. ASSESSMENT:  Patient received in bathroom finishing with nsg. Gait x70' with RW and min-CGA. Cues for sequencing to equalize step length and depress scapulae. Vania improved with distance but difficulty maintaining correction for >1 deviation at a time and a focus on her feet vs where she is going. Progression toward goals:  [x]      Improving appropriately and progressing toward goals  []      Improving slowly and progressing toward goals  []      Not making progress toward goals and plan of care will be adjusted     PLAN:  Patient continues to benefit from skilled intervention to address the above impairments. Continue treatment per established plan of care.   Discharge Recommendations:  Home Health  Further Equipment Recommendations for Discharge:  to be determined       SUBJECTIVE:   Patient stated My son had both knees done.     OBJECTIVE DATA SUMMARY:   Critical Behavior:  Neurologic State: Alert  Orientation Level: Oriented X4  Cognition: Appropriate decision making     Range of Motion:                          Functional Mobility Training:  Bed Mobility:                    Transfers:  Sit to Stand: Contact guard assistance  Stand to Sit: Contact guard assistance                             Balance:  Sitting: Intact  Standing: Intact; With support  Ambulation/Gait Training:  Distance (ft): 70 Feet (ft)  Assistive Device: Gait belt;Walker, rolling  Ambulation - Level of Assistance: Contact guard assistance        Gait Abnormalities: Antalgic;Decreased step clearance;Trunk sway increased  Right Side Weight Bearing: As tolerated     Base of Support: Shift to left  Stance: Right decreased  Speed/Vania: Pace decreased (<100 feet/min)  Step Length: Left shortened     Therapeutic Exercises:     EXERCISE   Sets   Reps   Active Active Assist   Passive Self ROM   Comments   Ankle Pumps 1 10 [x]                                        []                                        []                                        []                                           Quad Sets 1 10 [x]                                        []                                        []                                        []                                           Hamstring Sets   []                                        []                                        []                                        []                                           Short Arc Quads   []                                        []                                        []                                        []                                           Knee Extension Stretch     []                                          []                                          [] []                                           Heel Slides   []                                        []                                        []                                        []                                           Long Arc Quads 1 10 [x]                                        []                                        []                                        []                                           Knee Flexion Stretch   []                                        []                                        []                                        []                                           Straight Leg Raises   []                                        []                                        []                                        []                                             Pain:  Pain Scale 1: Numeric (0 - 10)  Pain Intensity 1: 5  Pain Location 1: Knee  Pain Orientation 1: Right  Pain Description 1: Aching  Pain Intervention(s) 1: Medication (see MAR)  Activity Tolerance:     Please refer to the flowsheet for vital signs taken during this treatment.   After treatment:   [x] Patient left in no apparent distress sitting up in chair  [] Patient left in no apparent distress in bed  [x] Call bell left within reach  [x] Nursing notified  [] Caregiver present  [] Bed alarm activated    COMMUNICATION/COLLABORATION:   The patients plan of care was discussed with: Registered Nurse    Eusebia Hyatt PT, DPT   Time Calculation: 24 mins

## 2018-05-16 NOTE — DISCHARGE INSTRUCTIONS
Patient meets criteria for   BUNDLED PAYMENT   for Care Improvement Initiative Criteria    Contact Information for Orthopedic Nurse Navigator:      SHIRA Montoya, RN-BC  [phone]  K:[phone]      After Hospital Care Plan:  Discharge Instructions Knee Replacement- Dr. Radha Rodríguez    Patient Name: Aline Mayes  Date of procedure: 5/15/2018   Procedure: Procedure(s):  RIGHT TOTAL KNEE REPLACEMENT  (SPINAL W/IV SED)  Surgeon: Celso Cockayne) and Role:     * Nathaniel Man MD - Primary  PCP: Toya Gonzalez MD  Date of discharge: No discharge date for patient encounter. Follow up appointments   Follow up with Dr. Radha Rodríguez in 3 weeks. Call [phone] to make an appointment.  If home health has been arranged for you the agency will contact you to arrange dates/times for visits. Please call them if you do not hear from them within 24 hours after you are discharged    When to call your Orthopaedic Surgeon: Call [phone]. If you call after 5pm or on a weekend, the on call physician will be contacted   Unrelieved pain   Signs of infection-if your incision is red; continues to have drainage; drainage has a foul odor or if you have a persistent fever over 101 degrees   Signs of a blood clot in your leg-calf pain, tenderness, redness, swelling of lower leg    When to call your Primary Care Physician:   Concerns about medical conditions such as diabetes, high blood pressure, asthma, congestive heart failure   Call if blood sugars are elevated, persistent headache or dizziness, coughing or congestion, constipation or diarrhea, burning with urination, abnormal heart rate    When to call [phone] go to the nearest emergency room   Acute onset of chest pain, shortness of breath, difficulty breathing      Activity   Weight bearing as tolerated with walker or crutches.  Refer to pages 23-31 of your handbook for instructions and pictures   Complete your Home Exercise Program daily as instructed by your therapist.  Refer to pages 33-41 of your handbook for instructions and pictures   Get up every one hour and walk (except at night when sleeping)   Do not drive or operate heavy machinery      Incision Care   The Aquacel (brown, waterproof) surgical dressing is to remain on your knee for 7 days. On the 7th day have someone gently peel the dressing off by carefully lifting the edge and stretching it slightly to break the adhesive seal   If you have steri-strips (small, white pieces of tape) on your incision, they may come off when you remove the Aquacel surgical dressing. This is okay. You may now leave your incision open to air   If your Aquacel dressing comes loose/off before the 7th day, you may replace it with a dry sterile gauze dressing; change it daily. Once you incision is not draining, you may leave it open to air   You may take a shower with the Aquacel dressing in place. Once the Aquacel is removed, you may shower and get your incision wet but do not submerge your incision under water in a bath tub, hot tub or swimming pool for 6 weeks after surgery. Preventing blood clots    Take Enteric coated Aspirin (delayed release) one tablet twice a day for one month following surgery    Pain management   Take pain medication as prescribed; decrease the amount you use as your pain lessens   Avoid alcoholic beverages while taking pain medication   Please be aware that many medications contain Tylenol (acetaminophen). We do not want you to over medicate so please read the information below as a guide. Do not take more than 3 Grams of Tylenol in a 24 hour period.   (There are 1000 milligrams in one Gram)   o Tylenol 325 mg per tablet (do not take more than 9 tablets in 24 hours)  o Tylenol 500 mg per tablet (do not take more than 6 tablets in 24 hours)  o Tylenol 650 mg per tablet (do not take more than 3 tablets in 24 hours)   Elevate your leg (do not bend/flex knee) and place  ice bags on your knee for 15-20 minutes after exercising    Diet   Resume usual diet; drink plenty of fluids; eat foods high in fiber   You may want to take a stool softener (such as Senokot-S or Colace) to prevent constipation while you are taking pain medication. If constipation occurs, take a laxative (such as Dulcolax tablets, Milk of Magnesia, or a suppository)      [facility] Protocol (to be followed by [facility]ohan)  Nursing-per physicians order   Complete head to toe assessment, vital signs   Medication reconciliation   Review pain management   Manage chronic medical conditions    Physical Therapy-per physicians order  Weight bearing status:  Precautions at Admission: Fall, WBAT     Right Side Weight Bearing: As tolerated    Mobility Status:  Supine to Sit: Minimum assistance  Sit to Stand: Contact guard assistance  Sit to Supine: Contact guard assistance, Additional time (verbal cues)     Gait:  Distance (ft): 150 Feet (ft)  Ambulation - Level of Assistance: Contact guard assistance  Assistive Device: Gait belt, Walker, rolling  Gait Abnormalities: Antalgic, Decreased step clearance, Trunk sway increased    ADL status overall composite:  Dressing Assistance: Supervision/set-up  Dressing Assistance: Stand-by assistance     Toilet Transfer : Stand-by assistance    Physical Therapy   Assessment and evaluation-bed mobility; functional transfers (bed, chair, bathroom, stairs); ambulation with equipment, car transfers, shower transfers, safety and ability to get out of house in the event of an emergency   Review weight bearing as tolerated, wean from walker or crutches as tolerated   Discuss pain management   Review how to do ADLs. Refer to page 42 of patient handbook    Home Exercise Program-refer to pages 33-41 of patient handbook for exercises.

## 2018-05-17 VITALS
OXYGEN SATURATION: 96 % | BODY MASS INDEX: 29.81 KG/M2 | SYSTOLIC BLOOD PRESSURE: 177 MMHG | TEMPERATURE: 98.2 F | DIASTOLIC BLOOD PRESSURE: 61 MMHG | HEART RATE: 61 BPM | RESPIRATION RATE: 16 BRPM | HEIGHT: 62 IN | WEIGHT: 162 LBS

## 2018-05-17 LAB — HGB BLD-MCNC: 9.5 G/DL (ref 11.5–16)

## 2018-05-17 PROCEDURE — 85018 HEMOGLOBIN: CPT | Performed by: ORTHOPAEDIC SURGERY

## 2018-05-17 PROCEDURE — 97116 GAIT TRAINING THERAPY: CPT

## 2018-05-17 PROCEDURE — 36415 COLL VENOUS BLD VENIPUNCTURE: CPT | Performed by: ORTHOPAEDIC SURGERY

## 2018-05-17 PROCEDURE — 74011250637 HC RX REV CODE- 250/637: Performed by: ORTHOPAEDIC SURGERY

## 2018-05-17 PROCEDURE — 74011250637 HC RX REV CODE- 250/637: Performed by: NURSE PRACTITIONER

## 2018-05-17 PROCEDURE — 97535 SELF CARE MNGMENT TRAINING: CPT

## 2018-05-17 PROCEDURE — 97530 THERAPEUTIC ACTIVITIES: CPT

## 2018-05-17 RX ORDER — ONDANSETRON 4 MG/1
2 TABLET, ORALLY DISINTEGRATING ORAL
Status: COMPLETED | OUTPATIENT
Start: 2018-05-17 | End: 2018-05-17

## 2018-05-17 RX ORDER — ONDANSETRON 4 MG/1
2 TABLET, ORALLY DISINTEGRATING ORAL
Qty: 5 TAB | Refills: 0 | Status: SHIPPED | OUTPATIENT
Start: 2018-05-17 | End: 2018-09-25

## 2018-05-17 RX ADMIN — Medication 5 ML: at 06:00

## 2018-05-17 RX ADMIN — NEBIVOLOL HYDROCHLORIDE 20 MG: 5 TABLET ORAL at 09:40

## 2018-05-17 RX ADMIN — POLYETHYLENE GLYCOL 3350 17 G: 17 POWDER, FOR SOLUTION ORAL at 09:40

## 2018-05-17 RX ADMIN — OXYCODONE HYDROCHLORIDE 5 MG: 5 TABLET ORAL at 10:16

## 2018-05-17 RX ADMIN — ASPIRIN 81 MG: 81 TABLET, COATED ORAL at 09:40

## 2018-05-17 RX ADMIN — ACETAMINOPHEN 500 MG: 500 TABLET, FILM COATED ORAL at 10:16

## 2018-05-17 RX ADMIN — HYDROCHLOROTHIAZIDE 25 MG: 25 TABLET ORAL at 09:41

## 2018-05-17 RX ADMIN — OXYCODONE HYDROCHLORIDE 5 MG: 5 TABLET ORAL at 03:51

## 2018-05-17 RX ADMIN — AMLODIPINE BESYLATE 5 MG: 5 TABLET ORAL at 09:45

## 2018-05-17 RX ADMIN — OXYCODONE HYDROCHLORIDE 5 MG: 5 TABLET ORAL at 00:35

## 2018-05-17 RX ADMIN — LORATADINE 10 MG: 10 TABLET ORAL at 09:41

## 2018-05-17 RX ADMIN — ONDANSETRON 2 MG: 4 TABLET, ORALLY DISINTEGRATING ORAL at 08:52

## 2018-05-17 RX ADMIN — ACETAMINOPHEN 500 MG: 500 TABLET, FILM COATED ORAL at 07:14

## 2018-05-17 RX ADMIN — STANDARDIZED SENNA CONCENTRATE AND DOCUSATE SODIUM 1 TABLET: 8.6; 5 TABLET, FILM COATED ORAL at 09:40

## 2018-05-17 RX ADMIN — OXYCODONE HYDROCHLORIDE 5 MG: 5 TABLET ORAL at 07:14

## 2018-05-17 NOTE — PROGRESS NOTES
Minimal pain. No cp/sob.  Mild nausea, no vomiting.     Visit Vitals    /61    Pulse 61    Temp 98.2 °F (36.8 °C)    Resp 16    Ht 5' 2\" (1.575 m)    Wt 73.5 kg (162 lb)    SpO2 96%    BMI 29.63 kg/m2          Alert   abd soft NT  R knee dressing dry  Calves soft NT  Motor 5/5  Pulses symmetrical     Recent Results (from the past 12 hour(s))   HEMOGLOBIN    Collection Time: 05/17/18  4:03 AM   Result Value Ref Range    HGB 9.5 (L) 11.5 - 16.0 g/dL          POD 2 R TKA; acute postop blood loss anemia (expected)  -ASA  -pain control  -may need low dose anti-emetic   -PT  -disch home today      Huma Jones MD

## 2018-05-17 NOTE — PROGRESS NOTES
[address]  SBAR Bundled Payment Handoff     FROM:                                TO: [address]                                                      ([address] [place] [address] or Facility name)  [address]  [address] [id number]  Dept: [address]: [phone]                                      Room#:  [phone]                                                      Discharging Nurse:  Mary Navarrete DG#:[phone]         SITUATION      ASAScore: ASA 3 - Patient with moderate systemic disease with functional limitations    Admitted:  5/15/2018  Hospital Day: 3      Attending Provider:  Digna Rodríguez MD     Consultations:  None    PCP:  Lalo Abdi MD   [phone]     Admitting Dx:  OSTEOARTHRITIS RIGHT KNEE  Primary localized osteoarthritis of right knee       Principal Problem:    Primary localized osteoarthritis of right knee (5/15/2018)      2 Days Post-Op of   Procedure(s):  RIGHT TOTAL KNEE REPLACEMENT  (SPINAL W/IV SED)   BY: Digna Rodríguez MD             ON: 5/15/2018                  Code Status: Full Code             Advance Directive? No Doesnt Have (Send w/patient)     Isolation:  There are currently no Active Isolations       MDRO: No current active infections    BACKGROUND     Allergies:   Allergies   Allergen Reactions    Azithromycin Shortness of Breath     Heart slows down  Heart slows down    Tetanus Toxoid, Adsorbed Shortness of Breath    Adhesive Tape-Silicones Rash    Codeine Hives     N/V  N/V    Dilaudid [Hydromorphone (Bulk)] Nausea and Vomiting    Dilaudid [Hydromorphone] Unknown (comments)     Other reaction(s): Unknown (comments)    Ivp Dye [Fd And C Blue No.1] Hives     SWELLING    Metrizamide Hives    Novocain [Procaine] Unknown (comments)     Other reaction(s): Unknown (comments)    Sectral [Acebutolol] Hives and Swelling    Sulfa (Sulfonamide Antibiotics) Nausea and Vomiting     And hives  Sulfasalazine Hives    Talwin [Pentazocine Lactate] Swelling    Tetanus Vaccines And Toxoid Shortness of Breath    Vasotec [Enalapril Maleate] Other (comments)     ELEVATED BP       Past Medical History:   Diagnosis Date    Arthritis     Asthma     albuterol    CAD (coronary artery disease)     Elevated glucose     borderline elevated A1c    GERD (gastroesophageal reflux disease)     Hypercholesterolemia     Hypertension     Nausea & vomiting     MALINI on CPAP        Past Surgical History:   Procedure Laterality Date    ECHO STRESS  2007    normal resting LV wall motion and no evidence of ischemia. The ejection fraction was 50-60%.  HOLTER MONITOR  2007    frequent ventricular extrasystoles. Rare couplets and triplets. NSR during diary entries.  HX HYSTERECTOMY  1986    HX ORTHOPAEDIC  1996    Right ROTATER CUFF SURGERY     HX ORTHOPAEDIC  2010    R KNEE MENISCUS    HX ORTHOPAEDIC  4/2013    left tibia repair    HX ORTHOPAEDIC  03/2012    LEFT ROTATER CUFF REPAIR     HX ORTHOPAEDIC  03/2009    THUMB TRIGGER FINGER RELEASE    HX OTHER SURGICAL      CARPAL TUNNEL BILATERAL, TRIGGER THUMB RELEASE    HX OTHER SURGICAL      Carotid dopplers 11/11- mild disease bilat    HX OTHER SURGICAL      Lexiscan Cardiolite 8/23/12 - normal; LVEF 65%    HX OTHER SURGICAL      Echo 8/23/12 - TDS, LVEF 60%, no sig valve disease, RVSP 25    HX OTHER SURGICAL      Carotid Duplex 3/14/13 - 10-49% stenosis bilat     HX OTHER SURGICAL      LE ZE's 3/14/13 - normal ZE's        Prior to Admission Medications   Prescriptions Last Dose Informant Patient Reported? Taking? MULTIVITAMIN (MULTIPLE VITAMINS PO) 5/8/2018 at Unknown time  Yes Yes   Sig: Take  by mouth daily. acetaminophen (TYLENOL) 325 mg tablet 5/13/2018  Yes No   Sig: Take 500 mg by mouth every four (4) hours as needed for Pain.    albuterol (PROVENTIL HFA, VENTOLIN HFA, PROAIR HFA) 90 mcg/actuation inhaler   Yes No   Sig: Take 2 Puffs by inhalation as needed. amLODIPine (NORVASC) 5 mg tablet 5/15/2018 at 0445  No Yes   Sig: Take 1 Tab by mouth daily. aspirin delayed-release 81 mg tablet 2018 at 0800  Yes Yes   Sig: Take  by mouth daily. cyanocobalamin (VITAMIN B-12) 1,000 mcg/mL injection 3/31/2018  Yes No   Si,000 mcg by IntraMUSCular route once. MONTHLY, APPROX 3/2018   hydroCHLOROthiazide (HYDRODIURIL) 25 mg tablet 2018 at Unknown time  No Yes   Sig: Take 1 Tab by mouth daily. ibuprofen (ADVIL) 200 mg tablet 4/15/2018 at Unknown time  Yes Yes   Sig: Take 200 mg by mouth every six (6) hours as needed for Pain.   loratadine (CLARITIN) 10 mg tablet 2018  Yes No   Sig: Take 10 mg by mouth daily. nebivolol (BYSTOLIC) 20 mg tablet 183 at 0445  No Yes   Sig: Take 1 Tab by mouth daily. polyethylene glycol (MIRALAX) 17 gram packet 2018  Yes No   Sig: Take 17 g by mouth daily. rosuvastatin (CRESTOR) 10 mg tablet 2018  No No   Sig: Take 1 Tab by mouth nightly. Facility-Administered Medications: None       Vaccinations:    Immunization History   Administered Date(s) Administered    Influenza Vaccine 10/01/2012    Pneumococcal Vaccine (Unspecified Type) 01/15/2006         ASSESSMENT   Age: 68 y.o.              Gender: female        Height: Height: 5' 2\" (157.5 cm)                    Weight:Weight: 73.5 kg (162 lb)     Patient Vitals for the past 8 hrs:   Temp Pulse Resp BP SpO2   18 0852 98.2 °F (36.8 °C) 61 16 177/61 96 %            Active Orders   Diet    DIET REGULAR       Orientation: Orientation Level: Oriented X4    Active Lines/Drains:  (Peg Tube / Nagy / CL or S/L?):no    Urinary Status: Voiding      Last BM: Last Bowel Movement Date: 18     Skin Integrity: Incision (comment) (right knee)   Wound Knee Right-DRESSING STATUS: Clean, dry, and intact    Wound Knee Right-DRESSING TYPE: Aquacel    Mobility: Slightly limited   Weight Bearing Status: WBAT (Weight Bearing as Tolerated) Gait Training  Assistive Device: Walker, rolling  Ambulation - Level of Assistance: Contact guard assistance  Distance (ft): 150 Feet (ft)  Stairs - Level of Assistance: Contact guard assistance  Number of Stairs Trained: 2  Rail Use: Left  (+ cane)     On Anticoagulation? YES  Aspirin                                      Last dose:  5/17/2018at 9:40    Pain Medications given:  Oxycodone                                Last dose: 5/17/2018 at  10:16    Lab Results   Component Value Date/Time    Glucose 195 (H) 05/16/2018 03:35 AM    Hemoglobin A1c 5.1 05/02/2018 03:44 PM    INR 1.1 05/02/2018 03:44 PM    INR 1.1 04/09/2013 08:08 PM    HGB 9.5 (L) 05/17/2018 04:03 AM    HGB 9.7 (L) 05/16/2018 03:35 AM    HGB 11.9 04/27/2018 07:38 AM    HGB 9.9 (L) 04/14/2013 04:20 AM       Readmission Risks: Falls  Score: 8     RECOMMENDATION     See After Visit Summary (AVS) for:  · Discharge instructions  · After [address]   · Medication Reconciliation          [facility] Orthopaedic Nurse Navigator  SHIRA Segovia, RN-BC       Office  [phone]  Cell      [phone]  Fax      [phone]  [email or web address]             . Andressa

## 2018-05-17 NOTE — PROGRESS NOTES
Problem: Mobility Impaired (Adult and Pediatric)  Goal: *Acute Goals and Plan of Care (Insert Text)  Physical Therapy Goals  Initiated 5/15/2018    1. Patient will move from supine to sit and sit to supine , scoot up and down and roll side to side in bed with modified independence within 4 days. 2. Patient will perform sit to stand with modified independence within 4 days. - Completing with supervision 5/17/18  3. Patient will ambulate with modified independence for 150 feet with the least restrictive device within 4 days. Completing with supervision 5/17/18  4. Patient will ascend/descend 2 stairs with SPC with modified independence within 4 days. Completing with CGA 5/17/18  5. Patient will perform home exercise program per protocol with modified independence within 4 days. MET 5/17/18  6. Patient will demonstrate AROM 0-90 degrees in operative joint within 4 days. progressing  physical Therapy TREATMENT/DISCHARGE  Patient: Mey Hooks (3 y.o. female)  Date: 5/17/2018  Diagnosis: OSTEOARTHRITIS RIGHT KNEE  Primary localized osteoarthritis of right knee Primary localized osteoarthritis of right knee  Procedure(s) (LRB):  RIGHT TOTAL KNEE REPLACEMENT  (SPINAL W/IV SED) (Right) 2 Days Post-Op  Precautions: Fall, WBAT  Chart, physical therapy assessment, plan of care and goals were reviewed. ASSESSMENT:  Pt has progressed well with overall mobility post op. Today she was able to, perform transfers and  ambulate 150ft with rolling walker with supervision only and ascended/descended 2 stairs with contact guard assist using L rail and SPC. Family present and educated on guarding techniques for gait and stairs. At this time, pt is safe to discharge home with family from a mobility standpoint and has no additional acute PT needs. Rec that pt follow up with HHPT upon discharge to progress mobility as tolerated.      Progression toward goals:  [x]          Improving appropriately and progressing toward goals  [] Improving slowly and progressing toward goals  []          Not making progress toward goals and plan of care will be adjusted     PLAN:  Patient will be discharged from physical therapy at this time. Rationale for discharge:  [x]     Goals Achieved  []     [address]  []     Patient not participating in therapy  []     Other:  Discharge Recommendations:  Home Health  Further Equipment Recommendations for Discharge:  Pt has a single point cane and a rolling walker for use at home upon discharge     SUBJECTIVE:   Patient stated I'm doing better now. I was nauseous this morning but now I'm good.     OBJECTIVE DATA SUMMARY:   Critical Behavior:  Neurologic State: Alert  Orientation Level: Oriented X4  Cognition: Appropriate decision making, Appropriate safety awareness  Safety/Judgement: Good awareness of safety precautions  Range of Motion:              RLE PROM  R Knee Flexion: 85  R Knee Extension: -5           Functional Mobility Training:  Bed Mobility:     Supine to Sit: Supervision  Sit to Supine:  (remained in chair)           Transfers:  Sit to Stand: Supervision  Stand to Sit: Supervision        Bed to Chair: Supervision                    Balance:  Sitting: Intact  Standing: Intact; With support  Ambulation/Gait Training:  Distance (ft): 150 Feet (ft)  Assistive Device: Walker, rolling     Right Side Weight Bearing: As tolerated     Base of Support: Widened     Speed/Vania: Slow     Stairs:  Number of Stairs Trained: 2  Stairs - Level of Assistance: Contact guard assistance  Rail Use: Left  (+ cane)  Therapeutic Exercises:     EXERCISE   Sets   Reps   Active Active Assist   Passive Self ROM   Comments   Ankle Pumps  10 [x]                                           []                                           []                                           []                                              Quad Sets   []                                           [] []                                           []                                              Hamstring Sets   []                                           []                                           []                                           []                                              Short Arc Quads   []                                           []                                           []                                           []                                              Knee Extension Stretch     []                                             []                                             []                                             []                                              Heel Slides   []                                           []                                           []                                           []                                              Long Arc Quads   []                                           []                                           []                                           []                                              Knee Flexion Stretch   []                                           []                                           []                                           []                                              Straight Leg Raises   []                                           []                                           []                                           []                                                Functional Measure:    Barthel Index:    Bathin  Bladder: 10  Bowels: 10  Groomin  Dressin  Feeding: 10  Mobility: 10  Stairs: 5  Toilet Use: 10  Transfer (Bed to Chair and Back): 15  Total: 85       Barthel and G-code impairment scale:  Percentage of impairment CH  0% CI  1-19% CJ  20-39% CK  40-59% CL  60-79% CM  80-99% CN  100%   Barthel Score 0-100 100 99-80 79-60 59-40 20-39 1-19   0   Barthel Score 0-20 20 17-19 13-16 9-12 5-8 1-4 0      The Barthel ADL Index: Guidelines  1. The index should be used as a record of what a patient does, not as a record of what a patient could do. 2. The main aim is to establish degree of independence from any help, physical or verbal, however minor and for whatever reason. 3. The need for supervision renders the patient not independent. 4. A patient's performance should be established using the best available evidence. Asking the patient, friends/relatives and nurses are the usual sources, but direct observation and common sense are also important. However direct testing is not needed. 5. Usually the patient's performance over the preceding 24-48 hours is important, but occasionally longer periods will be relevant. 6. Middle categories imply that the patient supplies over 50 per cent of the effort. 7. Use of aids to be independent is allowed. Tracy Love., Barthel, D.W. (8219). Functional evaluation: the Barthel Index. [address] [facility] (14)2. [facility] Press lawson KENDAL Mixon, David Mckeon., [facility]., [place], [address] [place] [address] (1999). Measuring the change indisability after inpatient rehabilitation; comparison of the responsiveness of the Barthel Index and Functional [place] Measure. Journal of Neurology, Neurosurgery, and Psychiatry, 66(4), 025-099. AL Bonilla, GINGER West, & J Carlos Guthrie MTheresaA. (2004.) Assessment of post-stroke quality of life in cost-effectiveness studies: The usefulness of the Barthel Index and the EuroQoL-5D. Quality of Life Research, 13, 977-14       G codes: In compliance with CMSs Claims Based Outcome Reporting, the following G-code set was chosen for this patient based on their primary functional limitation being treated: The outcome measure chosen to determine the severity of the functional limitation was the Barthel with a score of 85/100 which was correlated with the impairment scale.     ? Mobility - Walking and Moving Around:  - CURRENT STATUS: CI - 1%-19% impaired, limited or restricted    - GOAL STATUS: CI - 1%-19% impaired, limited or restricted    - D/C STATUS:  CI - 1%-19% impaired, limited or restricted     Pain:  Pain Scale 1: Numeric (0 - 10)  Pain Intensity 1: 8  Pain Location 1: Knee  Pain Orientation 1: Right  Pain Description 1: Aching  Pain Intervention(s) 1: Medication (see MAR)  Activity Tolerance:   Vital signs stable throughout, pain stable. Please refer to the flowsheet for vital signs taken during this treatment.   After treatment:   [x]  Patient left in no apparent distress sitting up in chair  []  Patient left in no apparent distress in bed  []  Call bell left within reach  [x]  Nursing notified  [x]  Caregiver present  []  Bed alarm activated    COMMUNICATION/COLLABORATION:   The patients plan of care was discussed with: Occupational Therapist and Registered Nurse    Dorcas Navarrete   Time Calculation: 45 mins

## 2018-05-17 NOTE — PROGRESS NOTES
Problem: Self Care Deficits Care Plan (Adult)  Goal: *Acute Goals and Plan of Care (Insert Text)  Occupational Therapy Goals  Initiated 5/16/2018  1. Patient will perform lower body ADLs with modified independence within 7 day(s). 2.  Patient will perform standing ADLs standing 5 mins without fatigue or LOB with modified independence within 7 day(s). 3.  Patient will perform all aspects of toileting with modified independence within 7day(s). 4.  Patient will complete toileting transfers with mod I within 7 day(s). 5.  Patient will utilize energy conservation techniques during functional activities without cues within 7 day(s). Occupational Therapy TREATMENT  Patient: Dwayne Gamino (75 y.o. female)  Date: 5/17/2018  Diagnosis: OSTEOARTHRITIS RIGHT KNEE  Primary localized osteoarthritis of right knee Primary localized osteoarthritis of right knee  Procedure(s) (LRB):  RIGHT TOTAL KNEE REPLACEMENT  (SPINAL W/IV SED) (Right) 2 Days Post-Op  Precautions: Fall, WBAT  Chart, occupational therapy assessment, plan of care, and goals were reviewed. ASSESSMENT:  Pt was able to progress with CHG bathing and dressing from chair this morning in preparation for discharge pending PT clearance. Pt eager to get moving but did c/o nausea. Pt provided with ginger ale and nursing assessed for intervention. Pt able to complete lower body dressing with min A overall mostly for distal dressing activities. Pt will have support from 4 daughters at home on a rotating schedule. Pt encouraged to discuss tub/shower tranfers with HHPT prior to attempting transfer. She reports understanding. Recommend home today with family support.    Progression toward goals:  [x]       Improving appropriately and progressing toward goals  []       Improving slowly and progressing toward goals  []       Not making progress toward goals and plan of care will be adjusted     PLAN:  Patient continues to benefit from skilled intervention to address the above impairments. Continue treatment per established plan of care. Discharge Recommendations:  None  Further Equipment Recommendations for Discharge:  none     SUBJECTIVE:   Patient stated I am really feeling much better but I did refuse to eat my breakfast.    OBJECTIVE DATA SUMMARY:   Cognitive/Behavioral Status:  Neurologic State: Alert  Orientation Level: Oriented X4  Cognition: Appropriate for age attention/concentration  Perception: Appears intact  Perseveration: No perseveration noted  Safety/Judgement: Good awareness of safety precautions    Functional Mobility and Transfers for ADLs:  Bed Mobility:  Supine to Sit: Supervision  Sit to Supine:  (remained in chair)    Transfers:  Sit to Stand: Supervision  Functional Transfers  Bathroom Mobility: Supervision/set up  Toilet Transfer : Supervision  Bed to Chair: Supervision    Balance:  Sitting: Intact  Standing: Intact; With support    ADL Intervention:    Patient instructed and demonstrated understanding in order to increase independence with lower body dressing, in a seated position, reach down surgical LE slowly to prevent tearing/shearing until slight pull is felt, hold at end range 10 seconds, and return to starting upright position 3 reps, 3 sets daily with independence. Patient recalled don/doff right LE 1st/last without cues. Patient instructed and indicated understanding technique of don all clothing sitting prior to standing, doff all clothing to knees standing, then sit to doff off knees - feet for fall prevention, pain management, energy conservation with independence .       Grooming  Grooming Assistance: Supervision/set up (standing at sink to wash hands)  Washing Face: Supervision/set-up  Washing Hands: Supervision/set-up    Upper Body Bathing  Bathing Assistance: Supervision/set-up  Position Performed: Seated in chair    Lower Body Bathing  Bathing Assistance: Minimum assistance  Perineal  : Supervision/set-up  Position Performed: Standing  Lower Body : Minimum assistance  Position Performed: Seated in chair    Upper Body Dressing Assistance  Dressing Assistance: Supervision/set-up  Pullover Shirt: Supervision/set-up    Lower Body Dressing Assistance  Dressing Assistance: Minimum assistance  Underpants: Supervision/set-up  Pants With Elastic Waist: Supervision/set-up  Socks: Minimum assistance  Slip on Shoes Without Back: Minimum assistance  Leg Crossed Method Used: No  Position Performed: Seated in chair  Cues: Verbal cues provided         Cognitive Retraining  Safety/Judgement: Good awareness of safety precautions    Pain:  Pain Scale 1: Numeric (0 - 10)  Pain Intensity 1: 9           Pain Intervention(s) 1: Medication (see MAR)  Activity Tolerance:   VSS throughout session.      After treatment:   [x] Patient left in no apparent distress sitting up in chair  [] Patient left in no apparent distress in bed  [x] Call bell left within reach  [x] Nursing notified  [] Caregiver present  [] Bed alarm activated    COMMUNICATION/COLLABORATION:   The patients plan of care was discussed with: Physical Therapist and Registered Nurse    Juanito Ortiz OT  Time Calculation: 39 mins

## 2018-05-17 NOTE — PROGRESS NOTES
I have reviewed discharge instructions with the patient. The patient verbalized understanding. Pt watched d/c video. Pt received hard rxs. Pt was d/c home via wheelchair by volunteer.

## 2018-05-17 NOTE — PROGRESS NOTES
Bedside and Verbal shift change report given to [facility] (oncoming nurse) by Nevin Ham (offgoing nurse). Report included the following information SBAR, Kardex, Intake/Output, MAR and Recent Results.

## 2018-05-17 NOTE — ROUTINE PROCESS
Bedside shift change report given to Uche MenendezRN (oncoming nurse) by Everardo Arnold RN(offgoing nurse). Report given with SBAR.

## 2018-05-17 NOTE — PROGRESS NOTES
Problem: Discharge Planning  Goal: *Discharge to safe environment  Outcome: Progressing Towards Goal  Discharge disposition: home with home health.     JOSEPH Sanchez/CRM

## 2018-05-18 ENCOUNTER — HOME CARE VISIT (OUTPATIENT)
Dept: SCHEDULING | Facility: HOME HEALTH | Age: 78
End: 2018-05-18
Payer: MEDICARE

## 2018-05-18 PROCEDURE — G0299 HHS/HOSPICE OF RN EA 15 MIN: HCPCS

## 2018-05-18 PROCEDURE — G0151 HHCP-SERV OF PT,EA 15 MIN: HCPCS

## 2018-05-18 PROCEDURE — 400013 HH SOC

## 2018-05-18 PROCEDURE — 3331090002 HH PPS REVENUE DEBIT

## 2018-05-18 PROCEDURE — 3331090001 HH PPS REVENUE CREDIT

## 2018-05-19 PROCEDURE — 3331090002 HH PPS REVENUE DEBIT

## 2018-05-19 PROCEDURE — 3331090001 HH PPS REVENUE CREDIT

## 2018-05-20 VITALS
BODY MASS INDEX: 29.44 KG/M2 | TEMPERATURE: 99 F | WEIGHT: 160 LBS | DIASTOLIC BLOOD PRESSURE: 72 MMHG | SYSTOLIC BLOOD PRESSURE: 120 MMHG | OXYGEN SATURATION: 95 % | HEIGHT: 62 IN | HEART RATE: 56 BPM

## 2018-05-20 PROCEDURE — 3331090001 HH PPS REVENUE CREDIT

## 2018-05-20 PROCEDURE — 3331090002 HH PPS REVENUE DEBIT

## 2018-05-21 ENCOUNTER — HOME CARE VISIT (OUTPATIENT)
Dept: SCHEDULING | Facility: HOME HEALTH | Age: 78
End: 2018-05-21
Payer: MEDICARE

## 2018-05-21 VITALS
DIASTOLIC BLOOD PRESSURE: 68 MMHG | OXYGEN SATURATION: 97 % | TEMPERATURE: 98.6 F | RESPIRATION RATE: 20 BRPM | HEART RATE: 72 BPM | SYSTOLIC BLOOD PRESSURE: 122 MMHG

## 2018-05-21 VITALS
SYSTOLIC BLOOD PRESSURE: 117 MMHG | DIASTOLIC BLOOD PRESSURE: 70 MMHG | HEART RATE: 63 BPM | OXYGEN SATURATION: 96 % | TEMPERATURE: 98.2 F

## 2018-05-21 PROCEDURE — 3331090002 HH PPS REVENUE DEBIT

## 2018-05-21 PROCEDURE — G0151 HHCP-SERV OF PT,EA 15 MIN: HCPCS

## 2018-05-21 PROCEDURE — 3331090001 HH PPS REVENUE CREDIT

## 2018-05-22 PROCEDURE — 3331090001 HH PPS REVENUE CREDIT

## 2018-05-22 PROCEDURE — 3331090002 HH PPS REVENUE DEBIT

## 2018-05-23 ENCOUNTER — PATIENT OUTREACH (OUTPATIENT)
Dept: OTHER | Age: 78
End: 2018-05-23

## 2018-05-23 PROCEDURE — 3331090001 HH PPS REVENUE CREDIT

## 2018-05-23 PROCEDURE — 3331090002 HH PPS REVENUE DEBIT

## 2018-05-23 NOTE — PROGRESS NOTES
This note will not be viewable in [address] Post Discharge Follow-up contact after Joint Replacement    Patient discharged on 5/17/18  By  Immanuel Tapia   following  right knee Arthroplasty. Spoke with patient today, who reports they \"don't have much of an appetite, but I am eating what I can . \"  Denies Fever, Shortness of Breath or Chest Pain. Home Health has visited. Patient also reports:. Incision  clean, dry, intact  Calf is non-tender,   operative extremity has moderate swelling. Pain is well managed. Discussed use of ice & elevation. is progressing with therapy and is exercising independently. Taking Aspirin for anticoagulation, oxycodone for pain. Patient   is not experiencing symptoms of constipation & urinating without difficulty. Discussed side effects of anticoagulants & pain medications (bleeding/bruising, constipation, lightheaded/dizziness)  Follow up appointment is scheduled. Pt has seen her PCP, Dr Selam Licea, for a follow up on 5/22/18. Discussed calling surgeon Dr Zehra Iniguez  for drainage, bleeding, swelling in operative extremity, fever or pain. Discussed calling PCP Dr Selam Licea with other medical issues.

## 2018-05-24 ENCOUNTER — HOME CARE VISIT (OUTPATIENT)
Dept: SCHEDULING | Facility: HOME HEALTH | Age: 78
End: 2018-05-24
Payer: MEDICARE

## 2018-05-24 VITALS
SYSTOLIC BLOOD PRESSURE: 130 MMHG | DIASTOLIC BLOOD PRESSURE: 74 MMHG | HEART RATE: 62 BPM | TEMPERATURE: 97.7 F | OXYGEN SATURATION: 97 %

## 2018-05-24 VITALS
HEART RATE: 76 BPM | SYSTOLIC BLOOD PRESSURE: 129 MMHG | OXYGEN SATURATION: 98 % | TEMPERATURE: 98 F | DIASTOLIC BLOOD PRESSURE: 70 MMHG

## 2018-05-24 PROCEDURE — 3331090001 HH PPS REVENUE CREDIT

## 2018-05-24 PROCEDURE — G0151 HHCP-SERV OF PT,EA 15 MIN: HCPCS

## 2018-05-24 PROCEDURE — G0152 HHCP-SERV OF OT,EA 15 MIN: HCPCS

## 2018-05-24 PROCEDURE — 3331090002 HH PPS REVENUE DEBIT

## 2018-05-24 PROCEDURE — G0155 HHCP-SVS OF CSW,EA 15 MIN: HCPCS

## 2018-05-25 ENCOUNTER — HOME CARE VISIT (OUTPATIENT)
Dept: SCHEDULING | Facility: HOME HEALTH | Age: 78
End: 2018-05-25
Payer: MEDICARE

## 2018-05-25 PROCEDURE — 3331090002 HH PPS REVENUE DEBIT

## 2018-05-25 PROCEDURE — 3331090001 HH PPS REVENUE CREDIT

## 2018-05-25 PROCEDURE — G0151 HHCP-SERV OF PT,EA 15 MIN: HCPCS

## 2018-05-26 PROCEDURE — 3331090002 HH PPS REVENUE DEBIT

## 2018-05-26 PROCEDURE — 3331090001 HH PPS REVENUE CREDIT

## 2018-05-27 VITALS
SYSTOLIC BLOOD PRESSURE: 138 MMHG | DIASTOLIC BLOOD PRESSURE: 82 MMHG | OXYGEN SATURATION: 97 % | TEMPERATURE: 97.7 F | HEART RATE: 62 BPM

## 2018-05-27 PROCEDURE — 3331090001 HH PPS REVENUE CREDIT

## 2018-05-27 PROCEDURE — 3331090002 HH PPS REVENUE DEBIT

## 2018-05-28 ENCOUNTER — HOME CARE VISIT (OUTPATIENT)
Dept: SCHEDULING | Facility: HOME HEALTH | Age: 78
End: 2018-05-28
Payer: MEDICARE

## 2018-05-28 VITALS
DIASTOLIC BLOOD PRESSURE: 70 MMHG | SYSTOLIC BLOOD PRESSURE: 112 MMHG | OXYGEN SATURATION: 97 % | TEMPERATURE: 98 F | HEART RATE: 73 BPM

## 2018-05-28 PROCEDURE — 3331090002 HH PPS REVENUE DEBIT

## 2018-05-28 PROCEDURE — G0151 HHCP-SERV OF PT,EA 15 MIN: HCPCS

## 2018-05-28 PROCEDURE — 3331090001 HH PPS REVENUE CREDIT

## 2018-05-29 PROCEDURE — 3331090002 HH PPS REVENUE DEBIT

## 2018-05-29 PROCEDURE — 3331090001 HH PPS REVENUE CREDIT

## 2018-05-30 ENCOUNTER — HOME CARE VISIT (OUTPATIENT)
Dept: SCHEDULING | Facility: HOME HEALTH | Age: 78
End: 2018-05-30
Payer: MEDICARE

## 2018-05-30 VITALS
DIASTOLIC BLOOD PRESSURE: 68 MMHG | SYSTOLIC BLOOD PRESSURE: 118 MMHG | OXYGEN SATURATION: 95 % | HEART RATE: 60 BPM | TEMPERATURE: 98.2 F

## 2018-05-30 PROCEDURE — 3331090002 HH PPS REVENUE DEBIT

## 2018-05-30 PROCEDURE — G0151 HHCP-SERV OF PT,EA 15 MIN: HCPCS

## 2018-05-30 PROCEDURE — 3331090001 HH PPS REVENUE CREDIT

## 2018-05-31 PROCEDURE — 3331090002 HH PPS REVENUE DEBIT

## 2018-05-31 PROCEDURE — 3331090001 HH PPS REVENUE CREDIT

## 2018-06-01 ENCOUNTER — HOME CARE VISIT (OUTPATIENT)
Dept: SCHEDULING | Facility: HOME HEALTH | Age: 78
End: 2018-06-01
Payer: MEDICARE

## 2018-06-01 VITALS
TEMPERATURE: 98.6 F | HEART RATE: 71 BPM | DIASTOLIC BLOOD PRESSURE: 70 MMHG | OXYGEN SATURATION: 97 % | SYSTOLIC BLOOD PRESSURE: 130 MMHG

## 2018-06-01 PROCEDURE — 3331090001 HH PPS REVENUE CREDIT

## 2018-06-01 PROCEDURE — 3331090002 HH PPS REVENUE DEBIT

## 2018-06-01 PROCEDURE — G0151 HHCP-SERV OF PT,EA 15 MIN: HCPCS

## 2018-06-02 PROCEDURE — 3331090002 HH PPS REVENUE DEBIT

## 2018-06-02 PROCEDURE — 3331090001 HH PPS REVENUE CREDIT

## 2018-06-03 PROCEDURE — 3331090002 HH PPS REVENUE DEBIT

## 2018-06-03 PROCEDURE — 3331090001 HH PPS REVENUE CREDIT

## 2018-06-04 ENCOUNTER — HOME CARE VISIT (OUTPATIENT)
Dept: SCHEDULING | Facility: HOME HEALTH | Age: 78
End: 2018-06-04
Payer: MEDICARE

## 2018-06-04 VITALS
OXYGEN SATURATION: 96 % | SYSTOLIC BLOOD PRESSURE: 110 MMHG | HEART RATE: 68 BPM | DIASTOLIC BLOOD PRESSURE: 72 MMHG | TEMPERATURE: 98.1 F

## 2018-06-04 PROCEDURE — 3331090001 HH PPS REVENUE CREDIT

## 2018-06-04 PROCEDURE — 3331090002 HH PPS REVENUE DEBIT

## 2018-06-04 PROCEDURE — G0151 HHCP-SERV OF PT,EA 15 MIN: HCPCS

## 2018-06-05 PROCEDURE — 3331090001 HH PPS REVENUE CREDIT

## 2018-06-05 PROCEDURE — 3331090002 HH PPS REVENUE DEBIT

## 2018-06-06 ENCOUNTER — HOSPITAL ENCOUNTER (OUTPATIENT)
Dept: VASCULAR SURGERY | Age: 78
Discharge: HOME | End: 2018-06-06
Attending: ORTHOPAEDIC SURGERY
Payer: MEDICARE

## 2018-06-06 ENCOUNTER — HOME CARE VISIT (OUTPATIENT)
Dept: SCHEDULING | Facility: HOME HEALTH | Age: 78
End: 2018-06-06
Payer: MEDICARE

## 2018-06-06 ENCOUNTER — HOSPITAL ENCOUNTER (EMERGENCY)
Age: 78
Discharge: HOME | End: 2018-06-06
Attending: EMERGENCY MEDICINE
Payer: MEDICARE

## 2018-06-06 VITALS
WEIGHT: 160 LBS | SYSTOLIC BLOOD PRESSURE: 161 MMHG | BODY MASS INDEX: 29.44 KG/M2 | TEMPERATURE: 98.7 F | HEART RATE: 61 BPM | HEIGHT: 62 IN | DIASTOLIC BLOOD PRESSURE: 76 MMHG | RESPIRATION RATE: 18 BRPM | OXYGEN SATURATION: 98 %

## 2018-06-06 VITALS
DIASTOLIC BLOOD PRESSURE: 88 MMHG | HEART RATE: 90 BPM | SYSTOLIC BLOOD PRESSURE: 130 MMHG | OXYGEN SATURATION: 97 % | TEMPERATURE: 98.4 F

## 2018-06-06 DIAGNOSIS — M79.669 CALF PAIN: ICD-10-CM

## 2018-06-06 DIAGNOSIS — I82.431 ACUTE DEEP VEIN THROMBOSIS (DVT) OF POPLITEAL VEIN OF RIGHT LOWER EXTREMITY (HCC): Primary | ICD-10-CM

## 2018-06-06 LAB
ANION GAP SERPL CALC-SCNC: 4 MMOL/L (ref 5–15)
BUN SERPL-MCNC: 10 MG/DL (ref 6–20)
BUN/CREAT SERPL: 12 (ref 12–20)
CALCIUM SERPL-MCNC: 9.4 MG/DL (ref 8.5–10.1)
CHLORIDE SERPL-SCNC: 105 MMOL/L (ref 97–108)
CO2 SERPL-SCNC: 29 MMOL/L (ref 21–32)
CREAT SERPL-MCNC: 0.82 MG/DL (ref 0.55–1.02)
ERYTHROCYTE [DISTWIDTH] IN BLOOD BY AUTOMATED COUNT: 13.5 % (ref 11.5–14.5)
GLUCOSE SERPL-MCNC: 98 MG/DL (ref 65–100)
HCT VFR BLD AUTO: 34.2 % (ref 35–47)
HGB BLD-MCNC: 11.3 G/DL (ref 11.5–16)
MCH RBC QN AUTO: 30 PG (ref 26–34)
MCHC RBC AUTO-ENTMCNC: 33 G/DL (ref 30–36.5)
MCV RBC AUTO: 90.7 FL (ref 80–99)
NRBC # BLD: 0 K/UL (ref 0–0.01)
NRBC BLD-RTO: 0 PER 100 WBC
PLATELET # BLD AUTO: 272 K/UL (ref 150–400)
PMV BLD AUTO: 9.7 FL (ref 8.9–12.9)
POTASSIUM SERPL-SCNC: 3.6 MMOL/L (ref 3.5–5.1)
RBC # BLD AUTO: 3.77 M/UL (ref 3.8–5.2)
SODIUM SERPL-SCNC: 138 MMOL/L (ref 136–145)
WBC # BLD AUTO: 5.7 K/UL (ref 3.6–11)

## 2018-06-06 PROCEDURE — 36415 COLL VENOUS BLD VENIPUNCTURE: CPT | Performed by: FAMILY MEDICINE

## 2018-06-06 PROCEDURE — G0151 HHCP-SERV OF PT,EA 15 MIN: HCPCS

## 2018-06-06 PROCEDURE — 3331090001 HH PPS REVENUE CREDIT

## 2018-06-06 PROCEDURE — 99283 EMERGENCY DEPT VISIT LOW MDM: CPT

## 2018-06-06 PROCEDURE — 85027 COMPLETE CBC AUTOMATED: CPT | Performed by: FAMILY MEDICINE

## 2018-06-06 PROCEDURE — 3331090002 HH PPS REVENUE DEBIT

## 2018-06-06 PROCEDURE — 93971 EXTREMITY STUDY: CPT

## 2018-06-06 PROCEDURE — 80048 BASIC METABOLIC PNL TOTAL CA: CPT | Performed by: FAMILY MEDICINE

## 2018-06-06 NOTE — PROCEDURES
[facility]  *** FINAL REPORT ***    Name: Niels Baeza  MRN: [id number]    Outpatient  : 19 Aug 1940  HIS Order #: [id number]  [id number] [facility] Visit #: [id number]  Date: 2018    TYPE OF TEST: Peripheral Venous Testing    REASON FOR TEST  Pain in limb    Right Leg:-  Deep venous thrombosis:           Yes  Proximal extent of thrombus:      Popliteal Below The Knee  Superficial venous thrombosis:    No  Deep venous insufficiency:        No  Superficial venous insufficiency: No      INTERPRETATION/FINDINGS  PROCEDURE:  RIGHT LOWER EXTREMITY  VENOUS DUPLEX. Evaluation of lower  extremity veins with ultrasound (B-mode imaging, pulsed Doppler, color   Doppler). Includes the common femoral, deep femoral, femoral,  popliteal, posterior tibial, peroneal, and great saphenous veins. Other veins, for example the gastrocnemius and soleal veins, may also  be visualized. FINDINGS: Lila Romo scale and color flow duplex images of the distal  popliteal, posterior tibia, and peroneal veins in the right lower  extremity demonstrate no compressibility, with filling defects. Thrombus appears acute. CONCLUSION: Right lower extremity venous duplex positive for acute  deep venous thrombosis involving the distal popliteal, posterior  tibial and peroneal veins. Left common femoral vein is thrombus free. ADDITIONAL COMMENTS    I have personally reviewed the data relevant to the interpretation of  this  study. TECHNOLOGIST: Jame Diaz. Gary  Signed: 2018 06:19 PM    PHYSICIAN: Negin Gregorio.  Concha Calles MD  Signed: 2018 09:42 AM

## 2018-06-06 NOTE — ED TRIAGE NOTES
Pt reports total right knee replacement 5/15 of this year. Sent here for positive DVT in right leg found in outpatient doppler today. Denies chest pain or SOB.

## 2018-06-06 NOTE — ED PROVIDER NOTES
HPI     68year old with complex medial history of asthma, CAD, GERD, Dyslipidemia, HTN, MALINI, OA s/p right total knee replacement sent here after found to have positive DVT at outpatient facility. Patient states symptoms started this morning with swelling notable in the right leg, up into the thigh. She states that she's had some swelling since the surgery but this was more than usual.  She states she had some intermittent calf pain but it wasn't severe; however, now calf  Pain is constant. Patient denies chest pain, palpitations, shortness of breath. Past Medical History:   Diagnosis Date    Arthritis     Asthma     albuterol    CAD (coronary artery disease)     Elevated glucose     borderline elevated A1c    GERD (gastroesophageal reflux disease)     Hypercholesterolemia     Hypertension     Nausea & vomiting     MALINI on CPAP        Past Surgical History:   Procedure Laterality Date    ECHO STRESS  2007    normal resting LV wall motion and no evidence of ischemia. The ejection fraction was 50-60%.  HOLTER MONITOR  2007    frequent ventricular extrasystoles. Rare couplets and triplets. NSR during diary entries.      HX HYSTERECTOMY  1986    HX ORTHOPAEDIC  1996    Right ROTATER CUFF SURGERY     HX ORTHOPAEDIC  2010    R KNEE MENISCUS    HX ORTHOPAEDIC  4/2013    left tibia repair    HX ORTHOPAEDIC  03/2012    LEFT ROTATER CUFF REPAIR     HX ORTHOPAEDIC  03/2009    THUMB TRIGGER FINGER RELEASE    HX OTHER SURGICAL      CARPAL TUNNEL BILATERAL, TRIGGER THUMB RELEASE    HX OTHER SURGICAL      Carotid dopplers 11/11- mild disease bilat    HX OTHER SURGICAL      Lexiscan Cardiolite 8/23/12 - normal; LVEF 65%    HX OTHER SURGICAL      Echo 8/23/12 - TDS, LVEF 60%, no sig valve disease, RVSP 25    HX OTHER SURGICAL      Carotid Duplex 3/14/13 - 10-49% stenosis bilat     HX OTHER SURGICAL      LE ZE's 3/14/13 - normal ZE's          Family History:   Problem Relation Age of Onset    Cancer Mother      LUNG AND CERVICAL CANCER    Heart Disease Father 46    Coronary Artery Disease Father     Heart Attack Father     Dementia Sister     Heart Disease Brother     Cancer Brother      COLON    Cancer Brother      THROAT CANCER    Diabetes Brother     Lung Disease Brother     Sleep Apnea Brother     Anesth Problems Neg Hx        Social History     Social History    Marital status:      Spouse name: N/A    Number of children: N/A    Years of education: N/A     Occupational History    Not on file. Social History Main Topics    Smoking status: Never Smoker    Smokeless tobacco: Never Used    Alcohol use No    Drug use: No    Sexual activity: No     Other Topics Concern    Not on file     Social History Narrative         ALLERGIES: Azithromycin; Tetanus toxoid, adsorbed; Adhesive tape-silicones; Codeine; Dilaudid [hydromorphone (bulk)]; Dilaudid [hydromorphone]; Ivp dye [fd and c blue no.1]; Metrizamide; Novocain [procaine]; Sectral [acebutolol]; Sulfa (sulfonamide antibiotics); Sulfasalazine; Talwin [pentazocine lactate]; Tetanus vaccines and toxoid; and Vasotec [enalapril maleate]    Review of Systems   Constitutional: Negative for diaphoresis. Respiratory: Negative for shortness of breath. Cardiovascular: Positive for leg swelling. Negative for chest pain and palpitations. Gastrointestinal: Negative for nausea and vomiting. Musculoskeletal: Negative for back pain. All other systems reviewed and are negative. Vitals:    06/06/18 1820   BP: 161/76   Pulse: 61   Resp: 18   Temp: 98.7 °F (37.1 °C)   SpO2: 98%   Weight: 72.6 kg (160 lb)   Height: 5' 2\" (1.575 m)            Physical Exam   Constitutional: She is oriented to person, place, and time. She appears well-developed and well-nourished. No distress. HENT:   Head: Atraumatic. Mouth/Throat: No oropharyngeal exudate. Eyes: Conjunctivae are normal. No scleral icterus. Neck: Normal range of motion. Cardiovascular: Normal rate, regular rhythm and normal heart sounds. Exam reveals no gallop and no friction rub. No murmur heard. Pulmonary/Chest: Effort normal and breath sounds normal. No respiratory distress. She has no wheezes. She has no rales. Musculoskeletal:   Tenderness to palpation in the right calf. Right leg with 2+pitting edema from mid thigh down to ankles. DP was 2+ on the right   Neurological: She is alert and oriented to person, place, and time. Skin: Skin is warm and dry. Psychiatric: She has a normal mood and affect. Her behavior is normal.        MDM  Number of Diagnoses or Management Options  Acute deep vein thrombosis (DVT) of popliteal vein of right lower extremity [facility]):   Diagnosis management comments: Patient with DVT. Hemodynamically stable, no tachycardia. O2 Sats 98% on RA. Discussed the various types of anticoagulation with patient as well as son present for exam. Patient does not want warfarin. Amenable to eliquis. Discussed with Ortho NURIS Ramirez who stated she is outside of window for true concerns for added bleeding from surgery while on [facility]. Sending patient home with xarelto   Creatining clearance is Estimated Creatinine Clearance: 53.6 mL/min (based on Cr of 0.82). So patient can do full dosing of xarelto. Advised to stop ibuprofen now.      Follow up with Ortho- Dr. Naz Braga is [place]  Follow up with PCP        ED Course       Procedures

## 2018-06-07 PROCEDURE — 3331090002 HH PPS REVENUE DEBIT

## 2018-06-07 PROCEDURE — 3331090001 HH PPS REVENUE CREDIT

## 2018-06-07 NOTE — DISCHARGE INSTRUCTIONS
Stop taking ibuprofen    Deep Vein Thrombosis: Care Instructions  Your Care Instructions    A deep vein thrombosis (DVT) is a blood clot in certain veins of the legs, pelvis, or arms. Blood clots in these veins need to be treated because they can get bigger, break loose, and travel through the bloodstream to the lungs. A blood clot in a lung can be life-threatening. The doctor may have given you a blood thinner (anticoagulant). A blood thinner can stop the blood clot from growing larger and prevent new clots from forming. You will need to take a blood thinner for 3 to 6 months or longer. The doctor has checked you carefully, but problems can develop later. If you notice any problems or new symptoms, get medical treatment right away. Follow-up care is a key part of your treatment and safety. Be sure to make and go to all appointments, and call your doctor if you are having problems. It's also a good idea to know your test results and keep a list of the medicines you take. How can you care for yourself at home? · Take your medicines exactly as prescribed. Call your doctor if you think you are having a problem with your medicine. · If you are taking a blood thinner, be sure you get instructions about how to take your medicine safely. Blood thinners can cause serious bleeding problems. · Wear compression stockings if your doctor recommends them. These stockings are tighter at the feet than on the legs. They may reduce pain and swelling in your legs. But there are different types of stockings, and they need to fit right. So your doctor will recommend what you need. · When you sit, use a pillow to raise the arm or leg that has the blood clot. Try to keep it above the level of your heart. When should you call for help? Call 911 anytime you think you may need emergency care. For example, call if:  ? · You passed out (lost consciousness).    ? · You have symptoms of a blood clot in your lung (called a pulmonary embolism). These include:  ¨ Sudden chest pain. ¨ Trouble breathing. ¨ Coughing up blood. ?Call your doctor now or seek immediate medical care if:  ? · You have new or worse trouble breathing. ? · You are dizzy or lightheaded, or you feel like you may faint. ? · You have symptoms of a blood clot in your arm or leg. These may include:  ¨ Pain in the arm, calf, back of the knee, thigh, or groin. ¨ Redness and swelling in the arm, leg, or groin. ? Watch closely for changes in your health, and be sure to contact your doctor if:  ? · You do not get better as expected. Where can you learn more? Go to http://diane-fiorella.info/. Enter H801 in the search box to learn more about \"Deep Vein Thrombosis: Care Instructions. \"  Current as of: March 20, 2017  Content Version: 11.4  © 8062-6383 [employer]. Care instructions adapted under license by [employer] (which disclaims liability or warranty for this information).  If you have questions about a medical condition or this instruction, always ask your healthcare professional. [place] [address] any warranty or liability for your use of this information.

## 2018-06-08 ENCOUNTER — HOME CARE VISIT (OUTPATIENT)
Dept: SCHEDULING | Facility: HOME HEALTH | Age: 78
End: 2018-06-08
Payer: MEDICARE

## 2018-06-08 VITALS
OXYGEN SATURATION: 96 % | HEART RATE: 72 BPM | DIASTOLIC BLOOD PRESSURE: 78 MMHG | TEMPERATURE: 99 F | SYSTOLIC BLOOD PRESSURE: 122 MMHG

## 2018-06-08 PROCEDURE — 3331090002 HH PPS REVENUE DEBIT

## 2018-06-08 PROCEDURE — G0151 HHCP-SERV OF PT,EA 15 MIN: HCPCS

## 2018-06-08 PROCEDURE — 3331090001 HH PPS REVENUE CREDIT

## 2018-06-09 PROCEDURE — 3331090002 HH PPS REVENUE DEBIT

## 2018-06-09 PROCEDURE — 3331090001 HH PPS REVENUE CREDIT

## 2018-06-10 PROCEDURE — 3331090002 HH PPS REVENUE DEBIT

## 2018-06-10 PROCEDURE — 3331090001 HH PPS REVENUE CREDIT

## 2018-06-11 PROCEDURE — 3331090001 HH PPS REVENUE CREDIT

## 2018-06-11 PROCEDURE — 3331090002 HH PPS REVENUE DEBIT

## 2018-06-12 PROCEDURE — 3331090002 HH PPS REVENUE DEBIT

## 2018-06-12 PROCEDURE — 3331090001 HH PPS REVENUE CREDIT

## 2018-06-13 PROCEDURE — 3331090001 HH PPS REVENUE CREDIT

## 2018-06-13 PROCEDURE — 3331090002 HH PPS REVENUE DEBIT

## 2018-06-14 ENCOUNTER — HOME CARE VISIT (OUTPATIENT)
Dept: SCHEDULING | Facility: HOME HEALTH | Age: 78
End: 2018-06-14
Payer: MEDICARE

## 2018-06-14 VITALS
OXYGEN SATURATION: 98 % | SYSTOLIC BLOOD PRESSURE: 138 MMHG | TEMPERATURE: 98.3 F | HEART RATE: 71 BPM | DIASTOLIC BLOOD PRESSURE: 80 MMHG

## 2018-06-14 PROCEDURE — 3331090002 HH PPS REVENUE DEBIT

## 2018-06-14 PROCEDURE — 3331090001 HH PPS REVENUE CREDIT

## 2018-06-14 PROCEDURE — G0151 HHCP-SERV OF PT,EA 15 MIN: HCPCS

## 2018-06-15 ENCOUNTER — HOME CARE VISIT (OUTPATIENT)
Dept: SCHEDULING | Facility: HOME HEALTH | Age: 78
End: 2018-06-15
Payer: MEDICARE

## 2018-06-15 VITALS
DIASTOLIC BLOOD PRESSURE: 70 MMHG | OXYGEN SATURATION: 98 % | HEART RATE: 68 BPM | SYSTOLIC BLOOD PRESSURE: 130 MMHG | TEMPERATURE: 99 F

## 2018-06-15 PROCEDURE — 3331090002 HH PPS REVENUE DEBIT

## 2018-06-15 PROCEDURE — 3331090001 HH PPS REVENUE CREDIT

## 2018-06-15 PROCEDURE — G0151 HHCP-SERV OF PT,EA 15 MIN: HCPCS

## 2018-06-16 ENCOUNTER — HOME CARE VISIT (OUTPATIENT)
Dept: HOME HEALTH SERVICES | Facility: HOME HEALTH | Age: 78
End: 2018-06-16
Payer: MEDICARE

## 2018-06-16 VITALS
DIASTOLIC BLOOD PRESSURE: 78 MMHG | OXYGEN SATURATION: 98 % | TEMPERATURE: 98.6 F | HEART RATE: 72 BPM | SYSTOLIC BLOOD PRESSURE: 112 MMHG

## 2018-06-16 PROCEDURE — 3331090001 HH PPS REVENUE CREDIT

## 2018-06-16 PROCEDURE — 3331090003 HH PPS REVENUE ADJ

## 2018-06-16 PROCEDURE — G0151 HHCP-SERV OF PT,EA 15 MIN: HCPCS

## 2018-06-16 PROCEDURE — 3331090002 HH PPS REVENUE DEBIT

## 2018-06-17 PROCEDURE — 3331090002 HH PPS REVENUE DEBIT

## 2018-06-17 PROCEDURE — 3331090001 HH PPS REVENUE CREDIT

## 2018-06-18 PROCEDURE — 3331090001 HH PPS REVENUE CREDIT

## 2018-06-18 PROCEDURE — 3331090002 HH PPS REVENUE DEBIT

## 2018-06-19 PROCEDURE — 3331090002 HH PPS REVENUE DEBIT

## 2018-06-19 PROCEDURE — 3331090001 HH PPS REVENUE CREDIT

## 2018-06-20 PROCEDURE — 3331090002 HH PPS REVENUE DEBIT

## 2018-06-20 PROCEDURE — 3331090001 HH PPS REVENUE CREDIT

## 2018-06-21 PROCEDURE — 3331090002 HH PPS REVENUE DEBIT

## 2018-06-21 PROCEDURE — 3331090001 HH PPS REVENUE CREDIT

## 2018-07-18 DIAGNOSIS — I77.9 BILATERAL CAROTID ARTERY DISEASE (HCC): ICD-10-CM

## 2018-07-18 DIAGNOSIS — I10 ESSENTIAL HYPERTENSION: ICD-10-CM

## 2018-07-18 DIAGNOSIS — E78.00 HYPERCHOLESTEROLEMIA: ICD-10-CM

## 2018-07-24 RX ORDER — HYDROCHLOROTHIAZIDE 25 MG/1
TABLET ORAL
Qty: 90 TAB | Refills: 1 | Status: SHIPPED | OUTPATIENT
Start: 2018-07-24 | End: 2019-04-26

## 2018-07-24 RX ORDER — ROSUVASTATIN CALCIUM 10 MG/1
TABLET, COATED ORAL
Qty: 90 TAB | Refills: 1 | Status: SHIPPED | OUTPATIENT
Start: 2018-07-24 | End: 2019-08-19 | Stop reason: SDUPTHER

## 2018-09-25 ENCOUNTER — OFFICE VISIT (OUTPATIENT)
Dept: CARDIOLOGY CLINIC | Age: 78
End: 2018-09-25

## 2018-09-25 VITALS
BODY MASS INDEX: 29.52 KG/M2 | SYSTOLIC BLOOD PRESSURE: 148 MMHG | WEIGHT: 160.4 LBS | DIASTOLIC BLOOD PRESSURE: 84 MMHG | HEART RATE: 57 BPM | RESPIRATION RATE: 16 BRPM | HEIGHT: 62 IN

## 2018-09-25 DIAGNOSIS — I10 ESSENTIAL HYPERTENSION: Primary | ICD-10-CM

## 2018-09-25 DIAGNOSIS — E78.00 HYPERCHOLESTEROLEMIA: ICD-10-CM

## 2018-09-25 RX ORDER — NEBIVOLOL 20 MG/1
20 TABLET ORAL DAILY
Qty: 21 TAB | Refills: 0 | Status: SHIPPED | COMMUNITY
Start: 2018-09-25 | End: 2019-05-30

## 2018-09-25 NOTE — MR AVS SNAPSHOT
[address] [facility] [address] [facility] 
[phone] Patient: Jennifer Barboza MRN: [id number] Visit Information Date & Time Provider Department Dept. Phone Encounter #  
 9/25/2018 11:40 AM Lazarus Dad, MD [facility] [phone] [id number] Your Appointments 3/28/2019 10:40 AM  
ESTABLISHED PATIENT with Lazarus Dad, MD  
[facility] ([address]) Appt Note: 6 mo fu  
 [address] [facility] [address] [facility]  
[address] [facility] [address] [address] Upcoming Health Maintenance Date Due DTaP/Tdap/Td series (1 - Tdap) 8/19/1961 Shingrix Vaccine Age 50> (1 of 2) 8/19/1990 GLAUCOMA SCREENING Q2Y 8/19/2005 Bone Densitometry (Dexa) Screening 8/19/2005 Pneumococcal 65+ Low/Medium Risk (2 of 2 - PPSV23) 1/15/2011 MEDICARE YEARLY EXAM 3/14/2018 Influenza Age 5 to Adult 8/1/2018 Allergies as of 9/25/2018  Review Complete On: 9/25/2018 By: Lazarus Dad, MD  
  
 Severity Noted Reaction Type Reactions Azithromycin High 07/14/2016    Shortness of Breath Heart slows down Heart slows down Tetanus Toxoid, Adsorbed High 11/11/2010    Shortness of Breath Adhesive Tape-silicones  06/13/2496    Rash Codeine  11/11/2010    Hives N/V 
N/V  
 Dilaudid [Hydromorphone (Bulk)]  03/08/2012    Nausea and Vomiting Dilaudid [Hydromorphone]  05/29/2012    Unknown (comments) Other reaction(s): Unknown (comments) Ivp Dye [Fd And C Blue No.1]  11/11/2010    Hives SWELLING Metrizamide  07/24/2017    Hives Novocain [Procaine]  04/10/2013   Not Verified Unknown (comments) Other reaction(s): Unknown (comments) Sectral [Acebutolol]  11/11/2010   Side Effect Hives, Swelling Sulfa (Sulfonamide Antibiotics)  03/08/2012    Nausea and Vomiting And hives Sulfasalazine  03/08/2012    Hives Talwin [Pentazocine Lactate]  11/11/2010    Swelling Tetanus Vaccines And Toxoid  11/11/2010    Shortness of Breath Vasotec [Enalapril Maleate]  11/11/2010    Other (comments) ELEVATED BP Current Immunizations  Reviewed on 4/9/2013 Name Date Influenza Vaccine 10/1/2012 Pneumococcal Vaccine (Unspecified Type) 1/15/2006 Not reviewed this visit You Were Diagnosed With   
  
 Codes Comments Essential hypertension    -  Primary ICD-10-CM: I10 
ICD-9-CM: 401.9 Hypercholesterolemia     ICD-10-CM: E78.00 ICD-9-CM: 272.0 Vitals BP Pulse Resp Height(growth percentile) Weight(growth percentile) BMI  
 148/84 (BP 1 Location: Left arm, BP Patient Position: Sitting) (!) 57 16 5' 2\" (1.575 m) 160 lb 6.4 oz (72.8 kg) 29.34 kg/m2 OB Status Smoking Status Hysterectomy Never Smoker Vitals History BMI and BSA Data Body Mass Index Body Surface Area  
 29.34 kg/m 2 1.78 m 2 Preferred Pharmacy Pharmacy Name Phone [email or web address] [phone] Your Updated Medication List  
  
   
This list is accurate as of 9/25/18 12:04 PM.  Always use your most recent med list.  
  
  
  
  
 albuterol 90 mcg/actuation inhaler Commonly known as:  PROVENTIL HFA, VENTOLIN HFA, PROAIR HFA Take 2 Puffs by inhalation as needed for Wheezing or Shortness of Breath. amLODIPine 5 mg tablet Commonly known as:  Yazoo Conine Take 1 Tab by mouth daily. CLARITIN 10 mg tablet Generic drug:  loratadine Take 10 mg by mouth daily as needed. hydroCHLOROthiazide 25 mg tablet Commonly known as:  HYDRODIURIL  
TAKE 1 TABLET EVERY DAY  
  
 MIRALAX 17 gram packet Generic drug:  polyethylene glycol Take 17 g by mouth as needed. nebivolol 20 mg tablet Commonly known as:  BYSTOLIC Take 1 Tab by mouth daily. rivaroxaban 15 mg (42)- 20 mg (9) Dspk Commonly known as:  Kirsten Root Take one 15 mg tablet twice a day with food for the first 21 days. Then, take one 20 mg tablet once a day with food for 9 days. rosuvastatin 10 mg tablet Commonly known as:  CRESTOR  
TAKE 1 TABLET EVERY NIGHT  
  
 VITAMIN B-12 1,000 mcg/mL injection Generic drug:  cyanocobalamin  
1,000 mcg by IntraMUSCular route once. MONTHLY, APPROX 3/2018 Introducing [facility] SERVICES! Dear Jose Alfredo Chou: 
Thank you for requesting a [employer] account. Our records indicate that you already have an active [employer] account. You can access your account anytime at [email or web address]. Karisma [email or web address]/[employer] Did you know that you can access your hospital and ER discharge instructions at any time in [employer]? You can also review all of your test results from your hospital stay or ER visit. Additional Information If you have questions, please visit the Frequently Asked Questions section of the [employer] website at [email or web address] Remember, [employer] is NOT to be used for urgent needs. For medical emergencies, dial 911. Now available from your iPhone and Android! Please provide this summary of care documentation to your next provider. Your primary care clinician is listed as Samantha Kelly. If you have any questions after today's visit, please call [phone].

## 2018-09-25 NOTE — PROGRESS NOTES
Margo Swann MD. [facility]              Patient: Asmita Ponce  : [id number]      Today's Date: 2018            HISTORY OF PRESENT ILLNESS:     History of Present Illness:  Here for follow-up. She had TKR and subsequent DVT . Doing better now. Breathing OK. No CP. Has some dizziness if she looks or reaches down and sometimes when she gets up. PAST MEDICAL HISTORY:     Past Medical History:   Diagnosis Date    Arthritis     Asthma     albuterol    CAD (coronary artery disease)     Elevated glucose     borderline elevated A1c    GERD (gastroesophageal reflux disease)     Hypercholesterolemia     Hypertension     Nausea & vomiting     MALINI on CPAP          Past Surgical History:   Procedure Laterality Date    ECHO STRESS      normal resting LV wall motion and no evidence of ischemia. The ejection fraction was 50-60%.  HOLTER MONITOR      frequent ventricular extrasystoles. Rare couplets and triplets. NSR during diary entries.      HX HYSTERECTOMY      HX ORTHOPAEDIC      Right ROTATER CUFF SURGERY     HX ORTHOPAEDIC      R KNEE MENISCUS    HX ORTHOPAEDIC  2013    left tibia repair    HX ORTHOPAEDIC  2012    LEFT ROTATER CUFF REPAIR     HX ORTHOPAEDIC  2009    THUMB TRIGGER FINGER RELEASE    HX OTHER SURGICAL      CARPAL TUNNEL BILATERAL, TRIGGER THUMB RELEASE    HX OTHER SURGICAL      Carotid dopplers - mild disease bilat    HX OTHER SURGICAL      Lexiscan Cardiolite 12 - normal; LVEF 65%    HX OTHER SURGICAL      Echo 12 - TDS, LVEF 60%, no sig valve disease, RVSP 25    HX OTHER SURGICAL      Carotid Duplex 3/14/13 - 10-49% stenosis bilat     HX OTHER SURGICAL      LE ZE's 3/14/13 - normal ZE's            MEDICATIONS:     Current Outpatient Prescriptions   Medication Sig Dispense Refill    hydroCHLOROthiazide (HYDRODIURIL) 25 mg tablet TAKE 1 TABLET EVERY DAY 90 Tab 1    rosuvastatin (CRESTOR) 10 mg tablet TAKE 1 TABLET EVERY NIGHT 90 Tab 1    rivaroxaban (XARELTO) 15 mg (42)- 20 mg (9) DsPk Take one 15 mg tablet twice a day with food for the first 21 days. Then, take one 20 mg tablet once a day with food for 9 days. 1 Dose Pack 0    albuterol (PROVENTIL HFA, VENTOLIN HFA, PROAIR HFA) 90 mcg/actuation inhaler Take 2 Puffs by inhalation as needed for Wheezing or Shortness of Breath.  loratadine (CLARITIN) 10 mg tablet Take 10 mg by mouth daily as needed.  amLODIPine (NORVASC) 5 mg tablet Take 1 Tab by mouth daily. 90 Tab 3    nebivolol (BYSTOLIC) 20 mg tablet Take 1 Tab by mouth daily. 30 Tab 12    polyethylene glycol (MIRALAX) 17 gram packet Take 17 g by mouth as needed.  cyanocobalamin (VITAMIN B-12) 1,000 mcg/mL injection 1,000 mcg by IntraMUSCular route once.  MONTHLY, APPROX 3/2018         Allergies   Allergen Reactions    Azithromycin Shortness of Breath     Heart slows down  Heart slows down    Tetanus Toxoid, Adsorbed Shortness of Breath    Adhesive Tape-Silicones Rash    Codeine Hives     N/V  N/V    Dilaudid [Hydromorphone (Bulk)] Nausea and Vomiting    Dilaudid [Hydromorphone] Unknown (comments)     Other reaction(s): Unknown (comments)    Ivp Dye [Fd And C Blue No.1] Hives     SWELLING    Metrizamide Hives    Novocain [Procaine] Unknown (comments)     Other reaction(s): Unknown (comments)    Sectral [Acebutolol] Hives and Swelling    Sulfa (Sulfonamide Antibiotics) Nausea and Vomiting     And hives    Sulfasalazine Hives    Talwin [Pentazocine Lactate] Swelling    Tetanus Vaccines And Toxoid Shortness of Breath    Vasotec [Enalapril Maleate] Other (comments)     ELEVATED BP             SOCIAL HISTORY:     Social History   Substance Use Topics    Smoking status: Never Smoker    Smokeless tobacco: Never Used    Alcohol use No         FAMILY HISTORY:     Family History   Problem Relation Age of Onset    Cancer Mother      LUNG AND CERVICAL CANCER    Heart Disease Father 46    Coronary Artery Disease Father     Heart Attack Father     Dementia Sister     Heart Disease Brother     Cancer Brother      COLON    Cancer Brother      THROAT CANCER    Diabetes Brother     Lung Disease Brother     Sleep Apnea Brother     Anesth Problems Neg Hx             REVIEW OF SYSTEMS:         Review of Systems:    Constitutional: Negative for fever, chills    HEENT: Negative for vision changes.    Respiratory: no cough    Cardiovascular: Negative for orthopnea, syncope, and PND.    Gastrointestinal: Negative for abdominal pain,or melena  + constipation   Genitourinary: Negative for dysuria    Musculoskeletal: Negative for myalgias.  + knee pain   Skin: Negative for rash    Heme: No problems bleeding.    Neurological: Negative for speech change and focal weakness.    + restless leg syndrome    + Urinary frequency               PHYSICAL EXAM:         Physical Exam:        Visit Vitals    /84 (BP 1 Location: Left arm, BP Patient Position: Sitting)    Pulse (!) 57    Resp 16    Ht 5' 2\" (1.575 m)    Wt 160 lb 6.4 oz (72.8 kg)    BMI 29.34 kg/m2          Patient appears generally well, mood and affect are appropriate and pleasant.    HEENT: Normocephalic, atraumatic. Lucillie Alken anicteric.  Hearing intact.    Neck Exam: Supple, no bruits    Lung Exam: Clear to auscultation, even breath sounds.    Cardiac Exam: Regular rate and rhythm with 1/6 systolic murmur    Abdomen: Soft, non-tender, normal bowel sounds.    Extremities: No lower extremity edema.    Psych - appropriate affect    Neuro - non focal           LABS / OTHER STUDIES:           Labs 4/18 - chol 149, LDL 77, , HDL 36   .Labs 8/18 - CBC and CMP OK               CARDIAC DIAGNOSTICS:        EKG 1/7/16 - sinus bradycardia, normal   EKG 1/19/17 - sinus marylin, otherwise normal   EKG 5/3/18 - sinus marylin, RBBB         Carotid Duplex 7/14/16 - 0-9% RITA and 42-46% LICA  Carotid Dopplers 5/18/17 - 10-49% stenosis bilat   Exercise Cardiolite 5/18/17 - walked 4:19 (7.0 METS) - normal stress EKG and MPI.  LVEF 67%  Echo 5/18/17 - LVEF 60%, grade 1 diastology.  RV normal.  Mild MR.  RVSP 37      Venous Doppler 6/6/18 - Right lower extremity venous duplex positive for acute deep venous thrombosis involving the distal popliteal, posterior tibial and peroneal veins.         ASSESSMENT AND PLAN:         Assessment and Plan:    1) HTN    - She had problems with lightheadedness which is better now. Symptoms have been suggestive of orthostatic hypotension. She has supine HTN. Previously symptoms improved somewhat after cutting back Hyzaar. Due to risk of orthostasis with Hyzaar, I stopped that. I then tried labetalol, but due to vague complaints with labetalol I had to stop that. Due to leg pains, we stopped Norvasc. Due to dry mouth, she couldn't tolerate even 0.1 QHS of clonidine.    - She is tolerating Norvasc, Bystolic and HCTZ.     - She is tolerating regimen right now - BP fair at home (130's/70's).       2) Mild carotid artery disease   - she is on a statin and ASA       3) History HYLTON and atypical chest pain    - lexiscan cardiolite and echo were normal 5/17   - Doing well on 5/3/18       4) Dyslipidemia    - we stopped atorvastatin and zeta given muscle complaints - those symptoms are better now    - she is tolerating Crestor   - recent lipids look good      5) Acute DVT 6/6/18 after Knee surgery 5/15/18   - Venous Doppler 6/6/18 - Right lower extremity venous duplex positive for acute deep venous thrombosis involving the distal popliteal, posterior tibial and peroneal veins.   - She is on Xarelto for short course   - She had repeat venous doppler done recently at [address] [place] [address] --- PCP is managing and is to see her soon - may be able to stop Xarelto soon       6) RTC in 4 months.  Patient expressed understanding of the plan - questions were answered.       Kids (she had 7) and grandkids are in the area. [facility]             Denis Holt MD, [address] [facility] [address]               [address]  [address] [facility], [address] [place] [address]  Ph: [phone]                                                              [phone]

## 2018-09-25 NOTE — PROGRESS NOTES
Chief Complaint   Patient presents with    Hypertension    Medication Evaluation     PT thinks xarelto is causing dizziness      1. Have you been to the ER, urgent care clinic since your last visit? Hospitalized since your last visit? Yes Reason for visit: [facility] - Blood Clot 6/6/18     2. Have you seen or consulted any other health care providers outside of the [address] [place] [address] since your last visit? Include any pap smears or colon screening.  Yes Reason for visit: Dr. Cortez Xie /84 (BP 1 Location: Left arm, BP Patient Position: Sitting)    Pulse (!) 57    Resp 16    Ht 5' 2\" (1.575 m)    Wt 160 lb 6.4 oz (72.8 kg)    BMI 29.34 kg/m2

## 2019-03-14 ENCOUNTER — TELEPHONE (OUTPATIENT)
Dept: CARDIOLOGY CLINIC | Age: 79
End: 2019-03-14

## 2019-03-14 DIAGNOSIS — E78.00 HYPERCHOLESTEROLEMIA: Primary | ICD-10-CM

## 2019-04-15 DIAGNOSIS — E78.00 HYPERCHOLESTEROLEMIA: ICD-10-CM

## 2019-04-18 LAB
ALBUMIN SERPL-MCNC: 4.5 G/DL (ref 3.5–4.8)
ALBUMIN/GLOB SERPL: 1.7 {RATIO} (ref 1.2–2.2)
ALP SERPL-CCNC: 85 IU/L (ref 39–117)
ALT SERPL-CCNC: 12 IU/L (ref 0–32)
AST SERPL-CCNC: 21 IU/L (ref 0–40)
BILIRUB SERPL-MCNC: 0.4 MG/DL (ref 0–1.2)
BUN SERPL-MCNC: 15 MG/DL (ref 8–27)
BUN/CREAT SERPL: 16 (ref 12–28)
CALCIUM SERPL-MCNC: 9.7 MG/DL (ref 8.7–10.3)
CHLORIDE SERPL-SCNC: 105 MMOL/L (ref 96–106)
CHOLEST SERPL-MCNC: 168 MG/DL (ref 100–199)
CO2 SERPL-SCNC: 24 MMOL/L (ref 20–29)
CREAT SERPL-MCNC: 0.95 MG/DL (ref 0.57–1)
GLOBULIN SER CALC-MCNC: 2.7 G/DL (ref 1.5–4.5)
GLUCOSE SERPL-MCNC: 111 MG/DL (ref 65–99)
HDLC SERPL-MCNC: 43 MG/DL
INTERPRETATION, 910389: NORMAL
INTERPRETATION: NORMAL
LDLC SERPL CALC-MCNC: 100 MG/DL (ref 0–99)
PDF IMAGE, 910387: NORMAL
POTASSIUM SERPL-SCNC: 4.1 MMOL/L (ref 3.5–5.2)
PROT SERPL-MCNC: 7.2 G/DL (ref 6–8.5)
SODIUM SERPL-SCNC: 143 MMOL/L (ref 134–144)
TRIGL SERPL-MCNC: 127 MG/DL (ref 0–149)
VLDLC SERPL CALC-MCNC: 25 MG/DL (ref 5–40)

## 2019-04-26 ENCOUNTER — OFFICE VISIT (OUTPATIENT)
Dept: CARDIOLOGY CLINIC | Age: 79
End: 2019-04-26

## 2019-04-26 VITALS
HEART RATE: 54 BPM | OXYGEN SATURATION: 96 % | HEIGHT: 62 IN | DIASTOLIC BLOOD PRESSURE: 80 MMHG | RESPIRATION RATE: 16 BRPM | BODY MASS INDEX: 29.81 KG/M2 | WEIGHT: 162 LBS | SYSTOLIC BLOOD PRESSURE: 140 MMHG

## 2019-04-26 DIAGNOSIS — I77.9 BILATERAL CAROTID ARTERY DISEASE (HCC): ICD-10-CM

## 2019-04-26 DIAGNOSIS — E78.00 HYPERCHOLESTEROLEMIA: ICD-10-CM

## 2019-04-26 DIAGNOSIS — I10 ESSENTIAL HYPERTENSION: ICD-10-CM

## 2019-04-26 DIAGNOSIS — I10 HYPERTENSION, UNSPECIFIED TYPE: Primary | ICD-10-CM

## 2019-04-26 RX ORDER — BISMUTH SUBSALICYLATE 262 MG
1 TABLET,CHEWABLE ORAL DAILY
COMMUNITY

## 2019-04-26 RX ORDER — HYDROCHLOROTHIAZIDE 25 MG/1
12.5 TABLET ORAL DAILY
Qty: 45 TAB | Refills: 3 | Status: SHIPPED | OUTPATIENT
Start: 2019-04-26 | End: 2019-09-20 | Stop reason: SDUPTHER

## 2019-04-26 NOTE — PROGRESS NOTES
1. Have you been to the ER, urgent care clinic since your last visit? Hospitalized since your last visit? No    2. Have you seen or consulted any other health care providers outside of the [address] [place] [address] since your last visit? Include any pap smears or colon screening. No     Pt reports Med Rec. Completed.     Chief Complaint   Patient presents with    Hypertension    Cholesterol Problem    Dizziness     x3 weeks     Visit Vitals  /80 (BP 1 Location: Left arm, BP Patient Position: Sitting)   Pulse (!) 54   Resp 16   Ht 5' 2\" (1.575 m)   Wt 162 lb (73.5 kg)   SpO2 96%   BMI 29.63 kg/m²

## 2019-04-26 NOTE — PROGRESS NOTES
Anny Noonan MD. [facility]              Patient: Adrienne Chang  : [birth year]      Today's Date: 2019          HISTORY OF PRESENT ILLNESS:     History of Present Illness:  Here for follow-up. When she gets up or bends down, she gets a little dizzy - passes on its own. Stays tired. No CP or SOB. PAST MEDICAL HISTORY:     Past Medical History:   Diagnosis Date    Arthritis     Asthma     albuterol    CAD (coronary artery disease)     DVT (deep venous thrombosis) ([facility])     Acute DVT 18 after Knee surgery 5/15/18     Elevated glucose     borderline elevated A1c    GERD (gastroesophageal reflux disease)     Hypercholesterolemia     Hypertension     Nausea & vomiting     MALINI on CPAP          Past Surgical History:   Procedure Laterality Date    ECHO STRESS      normal resting LV wall motion and no evidence of ischemia. The ejection fraction was 50-60%.  HOLTER MONITOR      frequent ventricular extrasystoles. Rare couplets and triplets. NSR during diary entries.  HX CATARACT REMOVAL Left 2019    HX HYSTERECTOMY      HX ORTHOPAEDIC      Right ROTATER CUFF SURGERY     HX ORTHOPAEDIC      R KNEE MENISCUS    HX ORTHOPAEDIC  2013    left tibia repair    HX ORTHOPAEDIC  2012    LEFT ROTATER CUFF REPAIR     HX ORTHOPAEDIC  2009    THUMB TRIGGER FINGER RELEASE    HX OTHER SURGICAL      CARPAL TUNNEL BILATERAL, TRIGGER THUMB RELEASE    HX OTHER SURGICAL      Carotid dopplers - mild disease bilat    HX OTHER SURGICAL      Lexiscan Cardiolite 12 - normal; LVEF 65%    HX OTHER SURGICAL      Echo 12 - TDS, LVEF 60%, no sig valve disease, RVSP 25    HX OTHER SURGICAL      Carotid Duplex 3/14/13 - 10-49% stenosis bilat     HX OTHER SURGICAL      LE ZE's 3/14/13 - normal ZE's          MEDICATIONS:     Current Outpatient Medications   Medication Sig Dispense Refill    multivitamin (ONE A DAY) tablet Take 1 Tab by mouth daily.  nebivolol (BYSTOLIC) 20 mg tablet Take 1 Tab by mouth daily. 21 Tab 0    hydroCHLOROthiazide (HYDRODIURIL) 25 mg tablet TAKE 1 TABLET EVERY DAY 90 Tab 1    rosuvastatin (CRESTOR) 10 mg tablet TAKE 1 TABLET EVERY NIGHT 90 Tab 1    loratadine (CLARITIN) 10 mg tablet Take 10 mg by mouth daily as needed.  amLODIPine (NORVASC) 5 mg tablet Take 1 Tab by mouth daily. 90 Tab 3    polyethylene glycol (MIRALAX) 17 gram packet Take 17 g by mouth as needed.  cyanocobalamin (VITAMIN B-12) 1,000 mcg/mL injection 1,000 mcg by IntraMUSCular route once. MONTHLY, APPROX 3/2018      albuterol (PROVENTIL HFA, VENTOLIN HFA, PROAIR HFA) 90 mcg/actuation inhaler Take 2 Puffs by inhalation as needed for Wheezing or Shortness of Breath.          Allergies   Allergen Reactions    Azithromycin Shortness of Breath     Heart slows down  Heart slows down    Tetanus Toxoid, Adsorbed Shortness of Breath    Adhesive Tape-Silicones Rash    Codeine Hives     N/V  N/V    Dilaudid [Hydromorphone (Bulk)] Nausea and Vomiting    Dilaudid [Hydromorphone] Unknown (comments)     Other reaction(s): Unknown (comments)    Ivp Dye [Fd And C Blue No.1] Hives     SWELLING    Metrizamide Hives    Novocain [Procaine] Unknown (comments)     Other reaction(s): Unknown (comments)    Sectral [Acebutolol] Hives and Swelling    Sulfa (Sulfonamide Antibiotics) Nausea and Vomiting     And hives    Sulfasalazine Hives    Talwin [Pentazocine Lactate] Swelling    Tetanus Vaccines And Toxoid Shortness of Breath    Vasotec [Enalapril Maleate] Other (comments)     ELEVATED BP             SOCIAL HISTORY:     Social History     Tobacco Use    Smoking status: Never Smoker    Smokeless tobacco: Never Used   Substance Use Topics    Alcohol use: No     Alcohol/week: 0.0 oz    Drug use: No         FAMILY HISTORY:     Family History   Problem Relation Age of Onset    Cancer Mother         LUNG AND CERVICAL CANCER    Heart Disease Father 46    Coronary Artery Disease Father     Heart Attack Father     Dementia Sister     Heart Disease Brother     Cancer Brother         COLON    Cancer Brother         THROAT CANCER    Diabetes Brother     Lung Disease Brother     Sleep Apnea Brother     Anesth Problems Neg Hx               REVIEW OF SYSTEMS:         Review of Systems:    Constitutional: Negative for fever, chills . Stays cold.     HEENT: Negative for vision changes.    Respiratory: no cough    Cardiovascular: Negative for orthopnea, syncope, and PND.    Gastrointestinal: Negative for abdominal pain,or melena  + constipation   Genitourinary: Negative for dysuria    Musculoskeletal: Negative for myalgias.  + knee pain   Skin: Negative for rash    Heme: No problems bleeding.    Neurological: Negative for speech change and focal weakness.    + restless leg syndrome    + Urinary frequency               PHYSICAL EXAM:         Physical Exam:        Visit Vitals  /80 (BP 1 Location: Left arm, BP Patient Position: Sitting)   Pulse (!) 54   Resp 16   Ht 5' 2\" (1.575 m)   Wt 162 lb (73.5 kg)   SpO2 96%   BMI 29.63 kg/m²          Patient appears generally well, mood and affect are appropriate and pleasant.    HEENT: Normocephalic, atraumatic. Murel Phillip anicteric.  Hearing intact.    Neck Exam: Supple, + left neck bruit   Lung Exam: Clear to auscultation, even breath sounds.    Cardiac Exam: Regular rate and rhythm with 1/6 systolic murmur    Abdomen: Soft, non-tender, normal bowel sounds.    Extremities: No lower extremity edema.    Psych - appropriate affect    Neuro - non focal           LABS / OTHER STUDIES:            Labs 4/18 - chol 149, LDL 77, , HDL 36   Labs 8/18 - CBC and CMP OK     Lab Results   Component Value Date/Time    Hemoglobin A1c 5.1 05/02/2018 03:44 PM     Lab Results   Component Value Date/Time    Sodium 143 04/17/2019 07:30 AM    Potassium 4.1 04/17/2019 07:30 AM    Chloride 105 04/17/2019 07:30 AM    CO2 24 04/17/2019 07:30 AM    Anion gap 4 (L) 06/06/2018 07:33 PM    Glucose 111 (H) 04/17/2019 07:30 AM    BUN 15 04/17/2019 07:30 AM    Creatinine 0.95 04/17/2019 07:30 AM    BUN/Creatinine ratio 16 04/17/2019 07:30 AM    GFR est AA 66 04/17/2019 07:30 AM    GFR est non-AA 58 (L) 04/17/2019 07:30 AM    Calcium 9.7 04/17/2019 07:30 AM    Bilirubin, total 0.4 04/17/2019 07:30 AM    AST (SGOT) 21 04/17/2019 07:30 AM    Alk. phosphatase 85 04/17/2019 07:30 AM    Protein, total 7.2 04/17/2019 07:30 AM    Albumin 4.5 04/17/2019 07:30 AM    Globulin 3.5 04/09/2013 08:08 PM    A-G Ratio 1.7 04/17/2019 07:30 AM    ALT (SGPT) 12 04/17/2019 07:30 AM       Lab Results   Component Value Date/Time    Cholesterol, total 168 04/17/2019 07:30 AM    HDL Cholesterol 43 04/17/2019 07:30 AM    LDL, calculated 100 (H) 04/17/2019 07:30 AM    VLDL, calculated 25 04/17/2019 07:30 AM    Triglyceride 127 04/17/2019 07:30 AM     Lab Results   Component Value Date/Time    WBC 5.7 06/06/2018 07:33 PM    HGB 11.3 (L) 06/06/2018 07:33 PM    HCT 34.2 (L) 06/06/2018 07:33 PM    PLATELET 953 85/27/0960 07:33 PM    MCV 90.7 06/06/2018 07:33 PM                     CARDIAC DIAGNOSTICS:        EKG 1/7/16 - sinus bradycardia, normal   EKG 1/19/17 - sinus marylin, otherwise normal   EKG 5/3/18 - sinus marylin, RBBB         Carotid Duplex 7/14/16 - 0-9% RITA and 64-51% LICA  Carotid Dopplers 5/18/17 - 10-49% stenosis bilat   Exercise Cardiolite 5/18/17 - walked 4:19 (7.0 METS) - normal stress EKG and MPI.  LVEF 67%  Echo 5/18/17 - LVEF 60%, grade 1 diastology.  RV normal.  Mild MR.  RVSP 37      Venous Doppler 6/6/18 - Right lower extremity venous duplex positive for acute deep venous thrombosis involving the distal popliteal, posterior tibial and peroneal veins.         ASSESSMENT AND PLAN:         Assessment and Plan:    1) HTN    - She had problems with lightheadedness which is better now. Symptoms have been suggestive of orthostatic hypotension. She has supine HTN.  Previously symptoms improved somewhat after cutting back Hyzaar. Due to risk of orthostasis with Hyzaar, I stopped that. I then tried labetalol, but due to vague complaints with labetalol I had to stop that. Due to leg pains, we stopped Norvasc. Due to dry mouth, she couldn't tolerate even 0.1 QHS of clonidine.    - She is tolerating Norvasc, Bystolic and HCTZ.     - She is tolerating regimen right now - BP fair at home (130-140/70-80). Due to some dizziness bending over, will cut back HCTZ to 12.5 mg daily.        2) Mild carotid artery disease   - she is on a statin and ASA   - recheck       3) History HYLTON and atypical chest pain    - lexiscan cardiolite and echo were normal 5/17   - Doing well on 5/3/18       4) Dyslipidemia    - we stopped atorvastatin and zeta given muscle complaints - those symptoms are better now    - she is tolerating Crestor   - recent lipids look good       5) Acute DVT 6/6/18 after Knee surgery 5/15/18   - Venous Doppler 6/6/18 - Right lower extremity venous duplex positive for acute deep venous thrombosis involving the distal popliteal, posterior tibial and peroneal veins.   - Was on Xarleto 3 months  - She had repeat venous doppler at [facility] which was OK per patient and Xarelto later stopped     6) RTC in 4 months.  Patient expressed understanding of the plan - questions were answered.       Kids (she had 7) and grandkids are in the area. Kel Barlow MD, [address] [place] [address], [address]                [phone] [address]  [address] [place] [address], [address] [place] [address], [address] [place] [address]  Ph: [phone]                                                              [phone]

## 2019-05-30 RX ORDER — NEBIVOLOL HYDROCHLORIDE 20 MG/1
TABLET ORAL
Qty: 30 TAB | Refills: 3 | Status: SHIPPED | OUTPATIENT
Start: 2019-05-30 | End: 2019-12-10 | Stop reason: SDUPTHER

## 2019-05-30 NOTE — TELEPHONE ENCOUNTER
Refill for bystolic 20 mg daily per Dr. Venancio Castro VO.    [facility] 4/26/19  NOV 8/30/19

## 2019-07-11 DIAGNOSIS — E78.00 HYPERCHOLESTEROLEMIA: ICD-10-CM

## 2019-07-11 DIAGNOSIS — I10 ESSENTIAL HYPERTENSION: ICD-10-CM

## 2019-07-12 RX ORDER — AMLODIPINE BESYLATE 5 MG/1
TABLET ORAL
Qty: 90 TAB | Refills: 0 | Status: SHIPPED | OUTPATIENT
Start: 2019-07-12 | End: 2020-01-23

## 2019-08-19 DIAGNOSIS — I77.9 BILATERAL CAROTID ARTERY DISEASE (HCC): ICD-10-CM

## 2019-08-19 DIAGNOSIS — E78.00 HYPERCHOLESTEROLEMIA: ICD-10-CM

## 2019-08-19 DIAGNOSIS — I10 ESSENTIAL HYPERTENSION: ICD-10-CM

## 2019-08-19 RX ORDER — ROSUVASTATIN CALCIUM 10 MG/1
TABLET, COATED ORAL
Qty: 90 TAB | Refills: 1 | Status: SHIPPED | OUTPATIENT
Start: 2019-08-19 | End: 2020-09-01

## 2019-08-19 NOTE — TELEPHONE ENCOUNTER
Refill for crestor 10 mg daily per Dr. Isa Rodas VO.    [facility] 4/26/19  NOV 8/30/19

## 2019-09-20 ENCOUNTER — OFFICE VISIT (OUTPATIENT)
Dept: CARDIOLOGY CLINIC | Age: 79
End: 2019-09-20

## 2019-09-20 VITALS
HEIGHT: 62 IN | BODY MASS INDEX: 29.81 KG/M2 | HEART RATE: 57 BPM | WEIGHT: 162 LBS | OXYGEN SATURATION: 97 % | DIASTOLIC BLOOD PRESSURE: 78 MMHG | SYSTOLIC BLOOD PRESSURE: 154 MMHG

## 2019-09-20 DIAGNOSIS — E78.00 HYPERCHOLESTEROLEMIA: Primary | ICD-10-CM

## 2019-09-20 DIAGNOSIS — I10 ESSENTIAL HYPERTENSION: ICD-10-CM

## 2019-09-20 RX ORDER — HYDROCHLOROTHIAZIDE 25 MG/1
25 TABLET ORAL DAILY
Qty: 90 TAB | Refills: 3 | Status: SHIPPED | OUTPATIENT
Start: 2019-09-20 | End: 2019-11-27 | Stop reason: ALTCHOICE

## 2019-09-20 NOTE — PROGRESS NOTES
Myrtle Toledo MD. [facility]              Patient: Ole Lennox  :       Today's Date: 2019          HISTORY OF PRESENT ILLNESS:     History of Present Illness:  Here for follow-up. Had shingles 6 weeks ago - getting better now. Other than that OK. No CP or SOB or syncope. PAST MEDICAL HISTORY:     Past Medical History:   Diagnosis Date    Arthritis     Asthma     albuterol    CAD (coronary artery disease)     DVT [facility] thrombosis) ([facility])     Acute DVT 18 after Knee surgery 5/15/18     Elevated glucose     borderline elevated A1c    GERD (gastroesophageal reflux disease)     Hypercholesterolemia     Hypertension     Nausea & vomiting     MALINI on CPAP     Shingles        Past Surgical History:   Procedure Laterality Date    ECHO STRESS      normal resting LV wall motion and no evidence of ischemia. The ejection fraction was 50-60%.  HOLTER MONITOR      frequent ventricular extrasystoles. Rare couplets and triplets. NSR during diary entries.      HX CATARACT REMOVAL Left 2019    HX HYSTERECTOMY      HX ORTHOPAEDIC      Right ROTATER CUFF SURGERY     HX ORTHOPAEDIC      R KNEE MENISCUS    HX ORTHOPAEDIC  2013    left tibia repair    HX ORTHOPAEDIC  2012    LEFT ROTATER CUFF REPAIR     HX ORTHOPAEDIC  2009    THUMB TRIGGER FINGER RELEASE    HX OTHER SURGICAL      CARPAL TUNNEL BILATERAL, TRIGGER THUMB RELEASE    HX OTHER SURGICAL      Carotid dopplers - mild disease bilat    HX OTHER SURGICAL      Lexiscan Cardiolite 12 - normal; LVEF 65%    HX OTHER SURGICAL      Echo 12 - TDS, LVEF 60%, no sig valve disease, RVSP 25    HX OTHER SURGICAL      Carotid Duplex 3/14/13 - 10-49% stenosis bilat     HX OTHER SURGICAL      LE ZE's 3/14/13 - normal ZE's          MEDICATIONS:     Current Outpatient Medications   Medication Sig Dispense Refill    rosuvastatin (CRESTOR) 10 mg tablet TAKE 1 TABLET EVERY NIGHT 90 Tab 1    amLODIPine (NORVASC) 5 mg tablet TAKE 1 TABLET EVERY DAY 90 Tab 0    BYSTOLIC 20 mg tablet TAKE ONE TABLET BY MOUTH DAILY 30 Tab 3    multivitamin (ONE A DAY) tablet Take 1 Tab by mouth daily.  hydroCHLOROthiazide (HYDRODIURIL) 25 mg tablet Take 0.5 Tabs by mouth daily. 45 Tab 3    albuterol (PROVENTIL HFA, VENTOLIN HFA, PROAIR HFA) 90 mcg/actuation inhaler Take 2 Puffs by inhalation as needed for Wheezing or Shortness of Breath.  loratadine (CLARITIN) 10 mg tablet Take 10 mg by mouth daily as needed.  cyanocobalamin (VITAMIN B-12) 1,000 mcg/mL injection 1,000 mcg by IntraMUSCular route once.  MONTHLY, APPROX 3/2018         Allergies   Allergen Reactions    Azithromycin Shortness of Breath     Heart slows down  Heart slows down    Tetanus Toxoid, Adsorbed Shortness of Breath    Adhesive Tape-Silicones Rash    Codeine Hives     N/V  N/V    Dilaudid [Hydromorphone (Bulk)] Nausea and Vomiting    Dilaudid [Hydromorphone] Unknown (comments)     Other reaction(s): Unknown (comments)    Ivp Dye [Fd And C Blue No.1] Hives     SWELLING    Metrizamide Hives    Novocain [Procaine] Unknown (comments)     Other reaction(s): Unknown (comments)    Sectral [Acebutolol] Hives and Swelling    Sulfa (Sulfonamide Antibiotics) Nausea and Vomiting     And hives    Sulfasalazine Hives    Talwin [Pentazocine Lactate] Swelling    Tetanus Vaccines And Toxoid Shortness of Breath    Vasotec [Enalapril Maleate] Other (comments)     ELEVATED BP           SOCIAL HISTORY:     Social History     Tobacco Use    Smoking status: Never Smoker    Smokeless tobacco: Never Used   Substance Use Topics    Alcohol use: No     Alcohol/week: 0.0 standard drinks    Drug use: No         FAMILY HISTORY:     Family History   Problem Relation Age of Onset    Cancer Mother         LUNG AND CERVICAL CANCER    Heart Disease Father 46    Coronary Artery Disease Father     Heart Attack Father     Dementia Sister     Heart Disease Brother     Cancer Brother         COLON    Cancer Brother         THROAT CANCER    Diabetes Brother     Lung Disease Brother     Sleep Apnea Brother     Anesth Problems Neg Hx             REVIEW OF SYSTEMS:         Review of Systems:    Constitutional: Negative for fever, chills . Stays cold.     HEENT: Negative for vision changes.    Respiratory: no cough    Cardiovascular: Negative for orthopnea, syncope, and PND.    Gastrointestinal: Negative for abdominal pain,or melena  + constipation   Genitourinary: Negative for dysuria    Musculoskeletal: Negative for myalgias.  + knee pain   Skin: Negative for rash    Heme: No problems bleeding.    Neurological: Negative for speech change and focal weakness.    + restless leg syndrome    + Urinary frequency               PHYSICAL EXAM:         Physical Exam:        Visit Vitals  /78 (BP 1 Location: Left arm, BP Patient Position: Sitting)   Pulse (!) 57   Ht 5' 2\" (1.575 m)   Wt 162 lb (73.5 kg)   SpO2 97%   BMI 29.63 kg/m²          Patient appears generally well, mood and affect are appropriate and pleasant.    HEENT: Normocephalic, atraumatic. Joyceann Elks anicteric.  Hearing intact.    Neck Exam: Supple, + left neck bruit   Lung Exam: Clear to auscultation, even breath sounds.    Cardiac Exam: Regular rate and rhythm with 1/6 systolic murmur    Abdomen: Soft, non-tender, normal bowel sounds.    Extremities: No lower extremity edema.    Psych - appropriate affect    Neuro - non focal           LABS / OTHER STUDIES:         Lab Results   Component Value Date/Time    Sodium 143 04/17/2019 07:30 AM    Potassium 4.1 04/17/2019 07:30 AM    Chloride 105 04/17/2019 07:30 AM    CO2 24 04/17/2019 07:30 AM    Anion gap 4 (L) 06/06/2018 07:33 PM    Glucose 111 (H) 04/17/2019 07:30 AM    BUN 15 04/17/2019 07:30 AM    Creatinine 0.95 04/17/2019 07:30 AM    BUN/Creatinine ratio 16 04/17/2019 07:30 AM    GFR est AA 66 04/17/2019 07:30 AM    GFR est non-AA 58 (L) 04/17/2019 07:30 AM    Calcium 9.7 04/17/2019 07:30 AM    Bilirubin, total 0.4 04/17/2019 07:30 AM    AST (SGOT) 21 04/17/2019 07:30 AM    Alk. phosphatase 85 04/17/2019 07:30 AM    Protein, total 7.2 04/17/2019 07:30 AM    Albumin 4.5 04/17/2019 07:30 AM    Globulin 3.5 04/09/2013 08:08 PM    A-G Ratio 1.7 04/17/2019 07:30 AM    ALT (SGPT) 12 04/17/2019 07:30 AM       Lab Results   Component Value Date/Time    WBC 5.7 06/06/2018 07:33 PM    HGB 11.3 (L) 06/06/2018 07:33 PM    HCT 34.2 (L) 06/06/2018 07:33 PM    PLATELET 559 18/77/3758 07:33 PM    MCV 90.7 06/06/2018 07:33 PM     Lab Results   Component Value Date/Time    Cholesterol, total 168 04/17/2019 07:30 AM    HDL Cholesterol 43 04/17/2019 07:30 AM    LDL, calculated 100 (H) 04/17/2019 07:30 AM    VLDL, calculated 25 04/17/2019 07:30 AM    Triglyceride 127 04/17/2019 07:30 AM                  CARDIAC DIAGNOSTICS:        EKG 1/7/16 - sinus bradycardia, normal   EKG 1/19/17 - sinus marylin, otherwise normal   EKG 5/3/18 - sinus marylin, RBBB         Carotid Duplex 7/14/16 - 0-9% RITA and 37-22% LICA  Carotid Dopplers 5/18/17 - 10-49% stenosis bilat   Exercise Cardiolite 5/18/17 - walked 4:19 (7.0 METS) - normal stress EKG and MPI.  LVEF 67%  Echo 5/18/17 - LVEF 60%, grade 1 diastology.  RV normal.  Mild MR.  RVSP 37      Venous Doppler 6/6/18 - Right lower extremity venous duplex positive for acute deep venous thrombosis involving the distal popliteal, posterior tibial and peroneal veins.     Carotid Doppler 9/20/19 - 10-49% stenosis bilat           ASSESSMENT AND PLAN:         Assessment and Plan:    1) HTN    - She had problems with lightheadedness which is better now. Symptoms have been suggestive of orthostatic hypotension. She has supine HTN. Previously symptoms improved somewhat after cutting back Hyzaar. Due to risk of orthostasis with Hyzaar, I stopped that. I then tried labetalol, but due to vague complaints with labetalol I had to stop that. Due to leg pains, we stopped Norvasc. Due to dry mouth, she couldn't tolerate even 0.1 QHS of clonidine.    - She is tolerating Norvasc, Bystolic and HCTZ.     - Since BP is high, will increase HCTZ to 25 mg daily - recheck BMP in 2 weeks       2) Mild carotid artery disease   - she is on a statin       3) History HYLTON and atypical chest pain    - lexiscan cardiolite and echo were normal 5/17   - Doing well lately without chest pain       4) Dyslipidemia    - we stopped atorvastatin and zeta given muscle complaints - those symptoms are better now    - she is tolerating Crestor   - recent lipids look good       5) See NP in one month.  Patient expressed understanding of the plan - questions were answered.       Kids (she had 7) and grandkids are in the area.         Thomas Rasheed MD, [address] [place] [address], Suite [facility]                [phone]  [address] [place] [address], [address] [place] [address], [address] [place] [address]  Ph: [phone]                                                             [phone]

## 2019-09-20 NOTE — PROGRESS NOTES
Darryl Candelaria is a 78 y.o. female    Chief Complaint   Patient presents with    Hypertension    Cholesterol Problem    Coronary Artery Disease       Chest pain No    SOB No    Dizziness patient stated some dizzines. She thinks it was coming from medication. Swelling No    Refills No    Visit Vitals  /78 (BP 1 Location: Left arm, BP Patient Position: Sitting)   Pulse (!) 57   Ht 5' 2\" (1.575 m)   Wt 162 lb (73.5 kg)   SpO2 97%   BMI 29.63 kg/m²       1. Have you been to the ER, urgent care clinic since your last visit? Hospitalized since your last visit? No    2. Have you seen or consulted any other health care providers outside of the [address] [place] [address] since your last visit? Include any pap smears or colon screening.  No

## 2019-09-24 ENCOUNTER — HOSPITAL ENCOUNTER (OUTPATIENT)
Dept: VASCULAR SURGERY | Age: 79
Discharge: HOME | End: 2019-09-24
Attending: ORTHOPAEDIC SURGERY
Payer: MEDICARE

## 2019-09-24 DIAGNOSIS — M79.661 RIGHT CALF PAIN: ICD-10-CM

## 2019-09-24 PROCEDURE — 93971 EXTREMITY STUDY: CPT

## 2019-10-04 ENCOUNTER — HOSPITAL ENCOUNTER (OUTPATIENT)
Dept: LAB | Age: 79
Discharge: HOME | End: 2019-10-04
Payer: MEDICARE

## 2019-10-04 PROCEDURE — 80048 BASIC METABOLIC PNL TOTAL CA: CPT

## 2019-10-04 PROCEDURE — 36415 COLL VENOUS BLD VENIPUNCTURE: CPT

## 2019-10-05 LAB
BUN SERPL-MCNC: 9 MG/DL (ref 8–27)
BUN/CREAT SERPL: 10 (ref 12–28)
CALCIUM SERPL-MCNC: 9.7 MG/DL (ref 8.7–10.3)
CHLORIDE SERPL-SCNC: 103 MMOL/L (ref 96–106)
CO2 SERPL-SCNC: 24 MMOL/L (ref 20–29)
CREAT SERPL-MCNC: 0.87 MG/DL (ref 0.57–1)
GLUCOSE SERPL-MCNC: 101 MG/DL (ref 65–99)
POTASSIUM SERPL-SCNC: 4.3 MMOL/L (ref 3.5–5.2)
SODIUM SERPL-SCNC: 142 MMOL/L (ref 134–144)

## 2019-10-21 ENCOUNTER — OFFICE VISIT (OUTPATIENT)
Dept: CARDIOLOGY CLINIC | Age: 79
End: 2019-10-21

## 2019-10-21 VITALS
BODY MASS INDEX: 29.26 KG/M2 | WEIGHT: 159 LBS | RESPIRATION RATE: 16 BRPM | OXYGEN SATURATION: 98 % | SYSTOLIC BLOOD PRESSURE: 156 MMHG | DIASTOLIC BLOOD PRESSURE: 68 MMHG | HEIGHT: 62 IN | HEART RATE: 58 BPM

## 2019-10-21 DIAGNOSIS — I10 ESSENTIAL HYPERTENSION: Primary | ICD-10-CM

## 2019-10-21 NOTE — PROGRESS NOTES
Alida Nicholson, [facility]  [id number]  [place], [address] [facility]    Office [phone]  Fax [phone]        Patient: Carmita Yañez  :       Today's Date:  10/21/2019      HISTORY OF PRESENT ILLNESS:     History of Present Illness:  Here for follow-up. Had shingles in Aug - still recovering. Biggest c/o is ongoing shingles pain for which she can not find relief. Following closely with PCP. Has not increased HCTZ as advised last visit. Misunderstood her instructions. Patient denies any exertional chest pain, dyspnea, palpitations, syncope, orthopnea, edema, or paroxysmal nocturnal dyspnea. Pt has long hx of sporadic CP that is non exertional; states pains are very brief. No new changes. PAST MEDICAL HISTORY:     Past Medical History:   Diagnosis Date    Arthritis     Asthma     albuterol    CAD (coronary artery disease)     DVT [facility]deep venous thrombosis) ([facility])     Acute DVT 18 after Knee surgery 5/15/18     Elevated glucose     borderline elevated A1c    GERD (gastroesophageal reflux disease)     Hypercholesterolemia     Hypertension     Nausea & vomiting     MALINI on CPAP     Shingles        Past Surgical History:   Procedure Laterality Date    ECHO STRESS      normal resting LV wall motion and no evidence of ischemia. The ejection fraction was 50-60%.  HOLTER MONITOR      frequent ventricular extrasystoles. Rare couplets and triplets. NSR during diary entries.      HX CATARACT REMOVAL Left 2019    HX HYSTERECTOMY      HX ORTHOPAEDIC      Right ROTATER CUFF SURGERY     HX ORTHOPAEDIC      R KNEE MENISCUS    HX ORTHOPAEDIC  2013    left tibia repair    HX ORTHOPAEDIC  2012    LEFT ROTATER CUFF REPAIR     HX ORTHOPAEDIC  2009    THUMB TRIGGER FINGER RELEASE    HX OTHER SURGICAL      CARPAL TUNNEL BILATERAL, TRIGGER THUMB RELEASE    HX OTHER SURGICAL      Carotid dopplers - mild disease bilat  HX OTHER SURGICAL      Lexiscan Cardiolite 8/23/12 - normal; LVEF 65%    HX OTHER SURGICAL      Echo 8/23/12 - TDS, LVEF 60%, no sig valve disease, RVSP 25    HX OTHER SURGICAL      Carotid Duplex 3/14/13 - 10-49% stenosis bilat     HX OTHER SURGICAL      LE ZE's 3/14/13 - normal ZE's          MEDICATIONS:     Current Outpatient Medications   Medication Sig Dispense Refill    rosuvastatin (CRESTOR) 10 mg tablet TAKE 1 TABLET EVERY NIGHT 90 Tab 1    amLODIPine (NORVASC) 5 mg tablet TAKE 1 TABLET EVERY DAY 90 Tab 0    BYSTOLIC 20 mg tablet TAKE ONE TABLET BY MOUTH DAILY 30 Tab 3    multivitamin (ONE A DAY) tablet Take 1 Tab by mouth daily.  albuterol (PROVENTIL HFA, VENTOLIN HFA, PROAIR HFA) 90 mcg/actuation inhaler Take 2 Puffs by inhalation as needed for Wheezing or Shortness of Breath.  loratadine (CLARITIN) 10 mg tablet Take 10 mg by mouth daily as needed.  cyanocobalamin (VITAMIN B-12) 1,000 mcg/mL injection 1,000 mcg by IntraMUSCular route once. MONTHLY, APPROX 3/2018      hydroCHLOROthiazide (HYDRODIURIL) 25 mg tablet Take 1 Tab by mouth daily.  90 Tab 3       Allergies   Allergen Reactions    Azithromycin Shortness of Breath     Heart slows down  Heart slows down    Tetanus Toxoid, Adsorbed Shortness of Breath    Adhesive Tape-Silicones Rash    Codeine Hives     N/V  N/V    Dilaudid [Hydromorphone (Bulk)] Nausea and Vomiting    Dilaudid [Hydromorphone] Unknown (comments)     Other reaction(s): Unknown (comments)    Ivp Dye [Fd And C Blue No.1] Hives     SWELLING    Metrizamide Hives    Novocain [Procaine] Unknown (comments)     Other reaction(s): Unknown (comments)    Sectral [Acebutolol] Hives and Swelling    Sulfa (Sulfonamide Antibiotics) Nausea and Vomiting     And hives    Sulfasalazine Hives    Talwin [Pentazocine Lactate] Swelling    Tetanus Vaccines And Toxoid Shortness of Breath    Vasotec [Enalapril Maleate] Other (comments)     ELEVATED BP           SOCIAL HISTORY:     Social History     Tobacco Use    Smoking status: Never Smoker    Smokeless tobacco: Never Used   Substance Use Topics    Alcohol use: No     Alcohol/week: 0.0 standard drinks    Drug use: No         FAMILY HISTORY:     Family History   Problem Relation Age of Onset    Cancer Mother         LUNG AND CERVICAL CANCER    Heart Disease Father 46    Coronary Artery Disease Father     Heart Attack Father     Dementia Sister     Heart Disease Brother     Cancer Brother         COLON    Cancer Brother         THROAT CANCER    Diabetes Brother     Lung Disease Brother     Sleep Apnea Brother     Anesth Problems Neg Hx             REVIEW OF SYSTEMS:         Review of Systems:    Constitutional: Negative for fever, chills . Stays cold. HEENT: Negative for vision changes.    Respiratory: no cough    Cardiovascular: Negative for orthopnea, syncope, and PND.    Gastrointestinal: Negative for abdominal pain,or melena  + constipation   Genitourinary: Negative for dysuria    Musculoskeletal: Negative for myalgias.  + knee pain   Heme: No problems bleeding.    Neurological: Negative for speech change and focal weakness.    + restless leg syndrome    + Urinary frequency               PHYSICAL EXAM:         Physical Exam:        Visit Vitals  /68 (BP 1 Location: Left arm, BP Patient Position: Sitting)   Pulse (!) 58   Resp 16   Ht 5' 2\" (1.575 m)   Wt 159 lb (72.1 kg)   SpO2 98%   BMI 29.08 kg/m²     Repeat /72.       Patient appears generally well, mood and affect are appropriate and pleasant.    HEENT: Normocephalic, atraumatic. Merribeth Comfort anicteric.  Hearing intact.    Neck Exam: Supple, no JVD  Lung Exam: Clear to auscultation, even breath sounds.    Cardiac Exam: Regular rate and rhythm with 1/6 systolic murmur    Abdomen: Soft, non-tender, normal bowel sounds.    Extremities: No lower extremity edema.    Psych - appropriate affect    Neuro - non focal           LABS / OTHER STUDIES:       Lab Results   Component Value Date/Time    Sodium 142 10/04/2019 08:35 AM    Potassium 4.3 10/04/2019 08:35 AM    Chloride 103 10/04/2019 08:35 AM    CO2 24 10/04/2019 08:35 AM    Anion gap 4 (L) 06/06/2018 07:33 PM    Glucose 101 (H) 10/04/2019 08:35 AM    BUN 9 10/04/2019 08:35 AM    Creatinine 0.87 10/04/2019 08:35 AM    BUN/Creatinine ratio 10 (L) 10/04/2019 08:35 AM    GFR est AA 73 10/04/2019 08:35 AM    GFR est non-AA 64 10/04/2019 08:35 AM    Calcium 9.7 10/04/2019 08:35 AM    Bilirubin, total 0.4 04/17/2019 07:30 AM    AST (SGOT) 21 04/17/2019 07:30 AM    Alk.  phosphatase 85 04/17/2019 07:30 AM    Protein, total 7.2 04/17/2019 07:30 AM    Albumin 4.5 04/17/2019 07:30 AM    Globulin 3.5 04/09/2013 08:08 PM    A-G Ratio 1.7 04/17/2019 07:30 AM    ALT (SGPT) 12 04/17/2019 07:30 AM       Lab Results   Component Value Date/Time    WBC 5.7 06/06/2018 07:33 PM    HGB 11.3 (L) 06/06/2018 07:33 PM    HCT 34.2 (L) 06/06/2018 07:33 PM    PLATELET 175 11/95/6440 07:33 PM    MCV 90.7 06/06/2018 07:33 PM     Lab Results   Component Value Date/Time    Cholesterol, total 168 04/17/2019 07:30 AM    HDL Cholesterol 43 04/17/2019 07:30 AM    LDL, calculated 100 (H) 04/17/2019 07:30 AM    VLDL, calculated 25 04/17/2019 07:30 AM    Triglyceride 127 04/17/2019 07:30 AM         CARDIAC DIAGNOSTICS:        EKG 1/7/16 - sinus bradycardia, normal   EKG 1/19/17 - sinus marylin, otherwise normal   EKG 5/3/18 - sinus marylin, RBBB     Carotid Duplex 7/14/16 - 0-9% RITA and 99-77% LICA  Carotid Dopplers 5/18/17 - 10-49% stenosis bilat   Exercise Cardiolite 5/18/17 - walked 4:19 (7.0 METS) - normal stress EKG and MPI.  LVEF 67%  Echo 5/18/17 - LVEF 60%, grade 1 diastology.  RV normal.  Mild MR.  RVSP 37      Venous Doppler 6/6/18 - Right lower extremity venous duplex positive for acute deep venous thrombosis involving the distal popliteal, posterior tibial and peroneal veins.     Carotid Doppler 9/20/19 - 10-49% stenosis bilat      ASSESSMENT AND PLAN:         Assessment and Plan:    1) HTN    - She had problems with lightheadedness which is better now. Symptoms have been suggestive of orthostatic hypotension. She has supine HTN. Previously symptoms improved somewhat after cutting back Hyzaar. Due to risk of orthostasis with Hyzaar, I stopped that. I then tried labetalol, but due to vague complaints with labetalol I had to stop that. Due to leg pains, we stopped Norvasc. Due to dry mouth, she couldn't tolerate even 0.1 QHS of clonidine.    - She is tolerating Norvasc, Bystolic and HCTZ.     - BP is high, increase HCTZ to 25 mg daily - recheck BMP in 3-4 weeks       2) Mild carotid artery disease   - she is on a statin; unsure if taking aspirin - will discuss at f/u      3) History HYLTON and atypical chest pain    - lexiscan cardiolite and echo were normal 5/17   - no changes / symptoms stable      4) Dyslipidemia    - we stopped atorvastatin and zeta given muscle complaints - those symptoms are better now    - she is tolerating Crestor   - recent lipids look good       Walter Donovan, ANP

## 2019-10-21 NOTE — PATIENT INSTRUCTIONS
Increase hydrochlorothiazide to 25mg per day. Follow back up in 1 month; bring your blood pressure cuff to this appointment. Please get repeat labs prior to this visit.

## 2019-10-21 NOTE — LETTER
10/22/19 Patient: Adeline Romano YOB: 1940 Date of Visit: 10/21/2019 MD ABENA Winn/ [address] [facility] [address] VIA Facsimile: [phone] Dear Ninfa Jimenes MD, Thank you for referring Ms. Rivka Mccurdy to [facility] for evaluation. My notes for this consultation are attached. If you have questions, please do not hesitate to call me. I look forward to following your patient along with you.  
 
 
Sincerely, 
 
Olivier Velázquez NP

## 2019-10-21 NOTE — PROGRESS NOTES
Chief Complaint   Patient presents with    Follow-up    Hypertension    Cholesterol Problem     Visit Vitals  /68 (BP 1 Location: Left arm, BP Patient Position: Sitting)   Pulse (!) 58   Resp 16   Ht 5' 2\" (1.575 m)   Wt 159 lb (72.1 kg)   SpO2 98%   BMI 29.08 kg/m²     Patient presents in office without complaint. No recent hospital visits. Needs refill on HCTZ 12.5 mg instead of 25 mg tabs because she cannot split HCTZ in half.

## 2019-11-18 ENCOUNTER — HOSPITAL ENCOUNTER (OUTPATIENT)
Dept: LAB | Age: 79
Discharge: HOME | End: 2019-11-18
Payer: MEDICARE

## 2019-11-18 PROCEDURE — 80048 BASIC METABOLIC PNL TOTAL CA: CPT

## 2019-11-18 PROCEDURE — 36415 COLL VENOUS BLD VENIPUNCTURE: CPT

## 2019-11-19 LAB
BUN SERPL-MCNC: 12 MG/DL (ref 8–27)
BUN/CREAT SERPL: 14 (ref 12–28)
CALCIUM SERPL-MCNC: 9 MG/DL (ref 8.7–10.3)
CHLORIDE SERPL-SCNC: 105 MMOL/L (ref 96–106)
CO2 SERPL-SCNC: 23 MMOL/L (ref 20–29)
CREAT SERPL-MCNC: 0.87 MG/DL (ref 0.57–1)
GLUCOSE SERPL-MCNC: 98 MG/DL (ref 65–99)
POTASSIUM SERPL-SCNC: 4.2 MMOL/L (ref 3.5–5.2)
SODIUM SERPL-SCNC: 141 MMOL/L (ref 134–144)

## 2019-11-27 ENCOUNTER — OFFICE VISIT (OUTPATIENT)
Dept: CARDIOLOGY CLINIC | Age: 79
End: 2019-11-27

## 2019-11-27 VITALS
DIASTOLIC BLOOD PRESSURE: 80 MMHG | BODY MASS INDEX: 29.08 KG/M2 | RESPIRATION RATE: 16 BRPM | HEART RATE: 62 BPM | OXYGEN SATURATION: 99 % | SYSTOLIC BLOOD PRESSURE: 128 MMHG | WEIGHT: 158 LBS | HEIGHT: 62 IN

## 2019-11-27 DIAGNOSIS — I65.23 BILATERAL CAROTID ARTERY STENOSIS: ICD-10-CM

## 2019-11-27 DIAGNOSIS — I10 ESSENTIAL HYPERTENSION: ICD-10-CM

## 2019-11-27 DIAGNOSIS — E78.5 DYSLIPIDEMIA: ICD-10-CM

## 2019-11-27 DIAGNOSIS — I10 ESSENTIAL HYPERTENSION: Primary | ICD-10-CM

## 2019-11-27 RX ORDER — LISINOPRIL 5 MG/1
5 TABLET ORAL DAILY
Qty: 30 TAB | Refills: 0 | Status: SHIPPED | OUTPATIENT
Start: 2019-11-27 | End: 2020-06-04

## 2019-11-27 RX ORDER — NEBIVOLOL 20 MG/1
20 TABLET ORAL DAILY
Qty: 7 TAB | Refills: 0 | Status: SHIPPED | COMMUNITY
Start: 2019-11-27 | End: 2019-12-10 | Stop reason: SDUPTHER

## 2019-11-27 NOTE — PATIENT INSTRUCTIONS
Stop HCTZ Start lisinopril 5mg per day Goal blood pressure <135/85. If your blood pressure is elevated at home on the new medication, please call us prior to follow-up. Follow-up in 6 months with Dr. Jada Rivera Get blood work when you see your primary care next.

## 2019-11-27 NOTE — PROGRESS NOTES
Pt in office for f/u on HTN, but states BP has been good (brought readings to office) complains of having to void every hour at night     Visit Vitals  /80 (BP 1 Location: Left arm, BP Patient Position: Sitting)   Pulse 62   Resp 16   Ht 5' 2\" (1.575 m)   Wt 158 lb (71.7 kg)   SpO2 99%   BMI 28.90 kg/m²

## 2019-11-27 NOTE — PROGRESS NOTES
Isis Patton, JAMIA  [address]  [place], [address] [facility]    Office [phone]  Fax [phone]        Patient: Carrie Sheffield  : [id number]      Today's Date:  2019      HISTORY OF PRESENT ILLNESS:     History of Present Illness:  Here for follow-up of blood pressure. Has home log and states blood pressures have been well controlled. 120s-130s/60s-70s. HR 50s-60s. Is frustrated with urination overnight since increasing HCTZ. States this is new and was not an issue in the past.  Wonders if she could try a different med. Still has shingles pain from her rt lower back running down into her upper thigh. States noting is helping. Patient denies any exertional chest pain, dyspnea, palpitations, syncope, orthopnea, edema, or paroxysmal nocturnal dyspnea. Pt has long hx of sporadic CP that is non exertional; states pains are very brief. No new changes. PAST MEDICAL HISTORY:     Past Medical History:   Diagnosis Date    Arthritis     Asthma     albuterol    CAD (coronary artery disease)     D[facility] ([facility]     Acute DVT 18 after Knee surgery 5/15/18     Elevated glucose     borderline elevated A1c    GERD (gastroesophageal reflux disease)     Hypercholesterolemia     Hypertension     Nausea & vomiting     MALINI on CPAP     Shingles        Past Surgical History:   Procedure Laterality Date    ECHO STRESS      normal resting LV wall motion and no evidence of ischemia. The ejection fraction was 50-60%.  HOLTER MONITOR      frequent ventricular extrasystoles. Rare couplets and triplets. NSR during diary entries.      HX CATARACT REMOVAL Left 2019    HX HYSTERECTOMY  1986    HX ORTHOPAEDIC      Right ROTATER CUFF SURGERY     HX ORTHOPAEDIC      R KNEE MENISCUS    HX ORTHOPAEDIC  2013    left tibia repair    HX ORTHOPAEDIC  2012    LEFT ROTATER CUFF REPAIR     HX ORTHOPAEDIC  2009 THUMB TRIGGER FINGER RELEASE    HX OTHER SURGICAL      CARPAL TUNNEL BILATERAL, TRIGGER THUMB RELEASE    HX OTHER SURGICAL      Carotid dopplers 11/11- mild disease bilat    HX OTHER SURGICAL      Lexiscan Cardiolite 8/23/12 - normal; LVEF 65%    HX OTHER SURGICAL      Echo 8/23/12 - TDS, LVEF 60%, no sig valve disease, RVSP 25    HX OTHER SURGICAL      Carotid Duplex 3/14/13 - 10-49% stenosis bilat     HX OTHER SURGICAL      LE ZE's 3/14/13 - normal ZE's          MEDICATIONS:     Current Outpatient Medications   Medication Sig Dispense Refill    aspirin delayed-release 81 mg tablet Take 1 Tab by mouth daily. 30 Tab 0    nebivolol (BYSTOLIC) 20 mg tablet Take 1 Tab by mouth daily. 7 Tab 0    lisinopril (PRINIVIL, ZESTRIL) 5 mg tablet Take 1 Tab by mouth daily. 30 Tab 0    rosuvastatin (CRESTOR) 10 mg tablet TAKE 1 TABLET EVERY NIGHT 90 Tab 1    amLODIPine (NORVASC) 5 mg tablet TAKE 1 TABLET EVERY DAY 90 Tab 0    BYSTOLIC 20 mg tablet TAKE ONE TABLET BY MOUTH DAILY 30 Tab 3    multivitamin (ONE A DAY) tablet Take 1 Tab by mouth daily.  albuterol (PROVENTIL HFA, VENTOLIN HFA, PROAIR HFA) 90 mcg/actuation inhaler Take 2 Puffs by inhalation as needed for Wheezing or Shortness of Breath.  loratadine (CLARITIN) 10 mg tablet Take 10 mg by mouth daily as needed.  cyanocobalamin (VITAMIN B-12) 1,000 mcg/mL injection 1,000 mcg by IntraMUSCular route once.  MONTHLY, APPROX 3/2018         Allergies   Allergen Reactions    Azithromycin Shortness of Breath     Heart slows down  Heart slows down    Tetanus Toxoid, Adsorbed Shortness of Breath    Adhesive Tape-Silicones Rash    Codeine Hives     N/V  N/V    Dilaudid [Hydromorphone (Bulk)] Nausea and Vomiting    Dilaudid [Hydromorphone] Unknown (comments)     Other reaction(s): Unknown (comments)    Ivp Dye [Fd And C Blue No.1] Hives     SWELLING    Metrizamide Hives    Novocain [Procaine] Unknown (comments)     Other reaction(s): Unknown (comments)    Sectral [Acebutolol] Hives and Swelling    Sulfa (Sulfonamide Antibiotics) Nausea and Vomiting     And hives    Sulfasalazine Hives    Talwin [Pentazocine Lactate] Swelling    Tetanus Vaccines And Toxoid Shortness of Breath    Vasotec [Enalapril Maleate] Other (comments)     ELEVATED BP           SOCIAL HISTORY:     Social History     Tobacco Use    Smoking status: Never Smoker    Smokeless tobacco: Never Used   Substance Use Topics    Alcohol use: No     Alcohol/week: 0.0 standard drinks    Drug use: No         FAMILY HISTORY:     Family History   Problem Relation Age of Onset    Cancer Mother         LUNG AND CERVICAL CANCER    Heart Disease Father 46    Coronary Artery Disease Father     Heart Attack Father     Dementia Sister     Heart Disease Brother     Cancer Brother         COLON    Cancer Brother         THROAT CANCER    Diabetes Brother     Lung Disease Brother     Sleep Apnea Brother     Anesth Problems Neg Hx             REVIEW OF SYSTEMS:         Review of Systems:    Constitutional: Negative for fever, chills . Stays cold.     HEENT: Negative for vision changes.    Respiratory: no cough    Cardiovascular: Negative for orthopnea, syncope, and PND.    Gastrointestinal: Negative for abdominal pain,or melena  + constipation   Genitourinary: Negative for dysuria    Musculoskeletal: Negative for myalgias.     Heme: No problems bleeding.    Neurological: Negative for speech change and focal weakness.    + restless leg syndrome    + Urinary frequency, now worse at night  + shingles pain               PHYSICAL EXAM:         Physical Exam:        Visit Vitals  /80 (BP 1 Location: Left arm, BP Patient Position: Sitting)   Pulse 62   Resp 16   Ht 5' 2\" (1.575 m)   Wt 158 lb (71.7 kg)   SpO2 99%   BMI 28.90 kg/m²     Patient appears generally well, mood and affect are appropriate and pleasant.    HEENT: Normocephalic, atraumatic.  sclera anicteric.  Hearing intact.    Neck Exam: Supple, no JVD  Lung Exam: Clear to auscultation, even breath sounds. Cardiac Exam: Regular rate and rhythm with 1/6 systolic murmur    Abdomen: Soft, non-tender, normal bowel sounds.    Extremities: No lower extremity edema.    Psych - appropriate affect    Neuro - non focal           LABS / OTHER STUDIES:         Lab Results   Component Value Date/Time    Sodium 141 11/18/2019 08:12 AM    Potassium 4.2 11/18/2019 08:12 AM    Chloride 105 11/18/2019 08:12 AM    CO2 23 11/18/2019 08:12 AM    Anion gap 4 (L) 06/06/2018 07:33 PM    Glucose 98 11/18/2019 08:12 AM    BUN 12 11/18/2019 08:12 AM    Creatinine 0.87 11/18/2019 08:12 AM    BUN/Creatinine ratio 14 11/18/2019 08:12 AM    GFR est AA 73 11/18/2019 08:12 AM    GFR est non-AA 64 11/18/2019 08:12 AM    Calcium 9.0 11/18/2019 08:12 AM    Bilirubin, total 0.4 04/17/2019 07:30 AM    AST (SGOT) 21 04/17/2019 07:30 AM    Alk.  phosphatase 85 04/17/2019 07:30 AM    Protein, total 7.2 04/17/2019 07:30 AM    Albumin 4.5 04/17/2019 07:30 AM    Globulin 3.5 04/09/2013 08:08 PM    A-G Ratio 1.7 04/17/2019 07:30 AM    ALT (SGPT) 12 04/17/2019 07:30 AM       Lab Results   Component Value Date/Time    WBC 5.7 06/06/2018 07:33 PM    HGB 11.3 (L) 06/06/2018 07:33 PM    HCT 34.2 (L) 06/06/2018 07:33 PM    PLATELET 170 61/28/5848 07:33 PM    MCV 90.7 06/06/2018 07:33 PM     Lab Results   Component Value Date/Time    Cholesterol, total 168 04/17/2019 07:30 AM    HDL Cholesterol 43 04/17/2019 07:30 AM    LDL, calculated 100 (H) 04/17/2019 07:30 AM    VLDL, calculated 25 04/17/2019 07:30 AM    Triglyceride 127 04/17/2019 07:30 AM         CARDIAC DIAGNOSTICS:        EKG 1/7/16 - sinus bradycardia, normal   EKG 1/19/17 - sinus marylin, otherwise normal   EKG 5/3/18 - sinus marylin, RBBB     Carotid Duplex 7/14/16 - 0-9% RITA and 27-48% LICA  Carotid Dopplers 5/18/17 - 10-49% stenosis bilat   Exercise Cardiolite 5/18/17 - walked 4:19 (7.0 METS) - normal stress EKG and MPI.  LVEF 67%  Echo 5/18/17 - LVEF 60%, grade 1 diastology.  RV normal.  Mild MR.  RVSP 37      Venous Doppler 6/6/18 - Right lower extremity venous duplex positive for acute deep venous thrombosis involving the distal popliteal, posterior tibial and peroneal veins.     Carotid Doppler 9/20/19 - 10-49% stenosis bilat        ASSESSMENT AND PLAN:         Assessment and Plan:    1) HTN    - She had problems with lightheadedness which is better now. Symptoms have been suggestive of orthostatic hypotension. She has supine HTN. Previously symptoms improved somewhat after cutting back Hyzaar. Due to risk of orthostasis with Hyzaar, I stopped that. I then tried labetalol, but due to vague complaints with labetalol I had to stop that. Due to leg pains, we stopped Norvasc.  Due to dry mouth, she couldn't tolerate even 0.1 QHS of clonidine.    - She is tolerating Norvasc and Bystolic     - c/o worsening nocturnal urination - change HCTZ to lisinopril 5mg/d   - has close f/u with PCP; rec BMP check at this time      2) Mild carotid artery disease   - she is on a statin as well as baby aspirin 81mg/d      3) History HYLTON and atypical chest pain    - lexiscan cardiolite and echo were normal 5/17   - no changes / symptoms stable      4) Dyslipidemia    - we stopped atorvastatin and zeta given muscle complaints - those symptoms are better now    - she is tolerating Crestor   - recent lipids look good     F/u in 6 months or PRN      Ernst JAMIA Kirby

## 2019-11-27 NOTE — LETTER
12/9/19 Patient: Carmita Yañez YOB: 1940 Date of Visit: 11/27/2019 MD ABENA Anne/ [address] [place] [address] [facility] [address] VIA Facsimile: [phone] Dear Norma Pedraza MD, Thank you for referring Ms. Rajiv Rsuhing to [facility] for evaluation. My notes for this consultation are attached. If you have questions, please do not hesitate to call me. I look forward to following your patient along with you.  
 
 
Sincerely, 
 
Gloria Herrera NP

## 2019-12-09 RX ORDER — ASPIRIN 81 MG/1
81 TABLET ORAL DAILY
Qty: 30 TAB | Refills: 0
Start: 2019-12-09 | End: 2021-06-24

## 2019-12-10 RX ORDER — NEBIVOLOL HYDROCHLORIDE 20 MG/1
TABLET ORAL
Qty: 90 TAB | Refills: 2 | Status: SHIPPED | OUTPATIENT
Start: 2019-12-10 | End: 2020-10-15 | Stop reason: SDUPTHER

## 2019-12-10 NOTE — TELEPHONE ENCOUNTER
Per Dr. Yonas Louis VO:  LOV:11/27/2019  Future Appointments   Date Time Provider Eliezer Conner   6/4/2020 10:40 AM Jesus Herzog MD [facility] [place]     Requested Prescriptions     Pending Prescriptions Disp Refills    BYSTOLIC 20 mg tablet [Pharmacy Med Name: BYSTOLIC 83MW TABLET] 30 Tab 10     Sig: TAKE ONE TABLET BY MOUTH DAILY

## 2020-01-20 DIAGNOSIS — E78.00 HYPERCHOLESTEROLEMIA: ICD-10-CM

## 2020-01-20 DIAGNOSIS — I10 ESSENTIAL HYPERTENSION: ICD-10-CM

## 2020-01-23 RX ORDER — AMLODIPINE BESYLATE 5 MG/1
TABLET ORAL
Qty: 90 TAB | Refills: 2 | Status: SHIPPED | OUTPATIENT
Start: 2020-01-23 | End: 2020-09-22

## 2020-01-23 NOTE — TELEPHONE ENCOUNTER
Per Dr. Hall Gage VO:  LOV:11/27/2019  Future Appointments   Date Time Provider Eliezer Conner   6/4/2020 10:40 AM Dayami Trujillo MD [facility]     Requested Prescriptions     Pending Prescriptions Disp Refills    amLODIPine (NORVASC) 5 mg tablet [Pharmacy Med Name: AMLODIPINE BESYLATE 5 MG Tablet] 90 Tab 0     Sig: TAKE 1 TABLET EVERY DAY

## 2020-06-04 ENCOUNTER — OFFICE VISIT (OUTPATIENT)
Dept: CARDIOLOGY CLINIC | Age: 80
End: 2020-06-04

## 2020-06-04 VITALS
HEART RATE: 64 BPM | BODY MASS INDEX: 28.71 KG/M2 | HEIGHT: 62 IN | SYSTOLIC BLOOD PRESSURE: 118 MMHG | DIASTOLIC BLOOD PRESSURE: 76 MMHG | OXYGEN SATURATION: 97 % | WEIGHT: 156 LBS

## 2020-06-04 DIAGNOSIS — I10 ESSENTIAL HYPERTENSION: Primary | ICD-10-CM

## 2020-06-04 DIAGNOSIS — I77.9 BILATERAL CAROTID ARTERY DISEASE, UNSPECIFIED TYPE (HCC): ICD-10-CM

## 2020-06-04 DIAGNOSIS — E78.00 HYPERCHOLESTEROLEMIA: ICD-10-CM

## 2020-06-04 NOTE — PROGRESS NOTES
Verta Holstein, MD. [facility] [place]              Patient: Rickie Almendarez  :       Today's Date: 2020            HISTORY OF PRESENT ILLNESS:     History of Present Illness:  She is doing well. No sig complaints. Slight SOB walking in the yard and brief (1 sec) atypical (random) CP. No new complaints. BP has been OK overall. PAST MEDICAL HISTORY:     Past Medical History:   Diagnosis Date    Arthritis     Asthma     albuterol    Carotid artery disease ([address]     mild    DVT [facility]deep venous thrombosis) ([facility])     Acute DVT 18 after Knee surgery 5/15/18     Elevated glucose     borderline elevated A1c    GERD (gastroesophageal reflux disease)     Hypercholesterolemia     Hypertension     Nausea & vomiting     MALINI on CPAP     Shingles          Past Surgical History:   Procedure Laterality Date    ECHO STRESS      normal resting LV wall motion and no evidence of ischemia. The ejection fraction was 50-60%.  HOLTER MONITOR      frequent ventricular extrasystoles. Rare couplets and triplets. NSR during diary entries.      HX CATARACT REMOVAL Left 2019    HX HYSTERECTOMY      HX ORTHOPAEDIC      Right ROTATER CUFF SURGERY     HX ORTHOPAEDIC      R KNEE MENISCUS    HX ORTHOPAEDIC  2013    left tibia repair    HX ORTHOPAEDIC  2012    LEFT ROTATER CUFF REPAIR     HX ORTHOPAEDIC  2009    THUMB TRIGGER FINGER RELEASE    HX OTHER SURGICAL      CARPAL TUNNEL BILATERAL, TRIGGER THUMB RELEASE    HX OTHER SURGICAL      Carotid dopplers - mild disease bilat    HX OTHER SURGICAL      Lexiscan Cardiolite 12 - normal; LVEF 65%    HX OTHER SURGICAL      Echo 12 - TDS, LVEF 60%, no sig valve disease, RVSP 25    HX OTHER SURGICAL      Carotid Duplex 3/14/13 - 10-49% stenosis bilat     HX OTHER SURGICAL      LE ZE's 3/14/13 - normal ZE's            MEDICATIONS:     Current Outpatient Medications   Medication Sig Dispense Refill    amLODIPine (NORVASC) 5 mg tablet TAKE 1 TABLET EVERY DAY 90 Tab 2    BYSTOLIC 20 mg tablet TAKE ONE TABLET BY MOUTH DAILY 90 Tab 2    aspirin delayed-release 81 mg tablet Take 1 Tab by mouth daily. 30 Tab 0    rosuvastatin (CRESTOR) 10 mg tablet TAKE 1 TABLET EVERY NIGHT 90 Tab 1    multivitamin (ONE A DAY) tablet Take 1 Tab by mouth daily.  albuterol (PROVENTIL HFA, VENTOLIN HFA, PROAIR HFA) 90 mcg/actuation inhaler Take 2 Puffs by inhalation as needed for Wheezing or Shortness of Breath.  loratadine (CLARITIN) 10 mg tablet Take 10 mg by mouth daily as needed.  cyanocobalamin (VITAMIN B-12) 1,000 mcg/mL injection 1,000 mcg by IntraMUSCular route once.  MONTHLY, APPROX 3/2018         Allergies   Allergen Reactions    Azithromycin Shortness of Breath     Heart slows down  Heart slows down    Tetanus Toxoid, Adsorbed Shortness of Breath    Adhesive Tape-Silicones Rash    Codeine Hives     N/V  N/V    Dilaudid [Hydromorphone (Bulk)] Nausea and Vomiting    Dilaudid [Hydromorphone] Unknown (comments)     Other reaction(s): Unknown (comments)    Ivp Dye [Fd And C Blue No.1] Hives     SWELLING    Metrizamide Hives    Novocain [Procaine] Unknown (comments)     Other reaction(s): Unknown (comments)    Sectral [Acebutolol] Hives and Swelling    Sulfa (Sulfonamide Antibiotics) Nausea and Vomiting     And hives    Sulfasalazine Hives    Talwin [Pentazocine Lactate] Swelling    Tetanus Vaccines And Toxoid Shortness of Breath    Vasotec [Enalapril Maleate] Other (comments)     ELEVATED BP           SOCIAL HISTORY:     Social History     Tobacco Use    Smoking status: Never Smoker    Smokeless tobacco: Never Used   Substance Use Topics    Alcohol use: No     Alcohol/week: 0.0 standard drinks    Drug use: No         FAMILY HISTORY:     Family History   Problem Relation Age of Onset    Cancer Mother         LUNG AND CERVICAL CANCER    Heart Disease Father 46    Coronary Artery Disease Father     Heart Attack Father     Dementia Sister     Heart Disease Brother     Cancer Brother         COLON    Cancer Brother         THROAT CANCER    Diabetes Brother     Lung Disease Brother     Sleep Apnea Brother     Anesth Problems Neg Hx             REVIEW OF SYSTEMS:         Review of Systems:    Constitutional: Negative for fever, chills .  Stays cold.    HEENT: Negative for vision changes.    Respiratory: no cough    Cardiovascular: Negative for orthopnea, syncope, and PND.    Gastrointestinal: Negative for abdominal pain,or melena  + constipation   Genitourinary: Negative for dysuria    Musculoskeletal: Negative for myalgias.  + knee pain   Skin: Negative for rash    Heme: No problems bleeding.    Neurological: Negative for speech change and focal weakness.    + restless leg syndrome    + Urinary frequency               PHYSICAL EXAM:         Physical Exam:        Visit Vitals  /76 (BP 1 Location: Left arm, BP Patient Position: Sitting)   Pulse 64   Ht 5' 2\" (1.575 m)   Wt 156 lb (70.8 kg)   SpO2 97%   BMI 28.53 kg/m²       Patient appears generally well, mood and affect are appropriate and pleasant.    HEENT: Normocephalic, atraumatic. Elise Salm anicteric.  Hearing intact.    Neck Exam: Supple, + left neck bruit   Lung Exam: Clear to auscultation, even breath sounds.    Cardiac Exam: Regular rate and rhythm with 1/6 systolic murmur    Abdomen: Soft, non-tender, normal bowel sounds.    Extremities: No lower extremity edema.    Psych - appropriate affect    Neuro - non focal           LABS / OTHER STUDIES:         Lab Results   Component Value Date/Time    Sodium 141 11/18/2019 08:12 AM    Potassium 4.2 11/18/2019 08:12 AM    Chloride 105 11/18/2019 08:12 AM    CO2 23 11/18/2019 08:12 AM    Anion gap 4 (L) 06/06/2018 07:33 PM    Glucose 98 11/18/2019 08:12 AM    BUN 12 11/18/2019 08:12 AM    Creatinine 0.87 11/18/2019 08:12 AM    BUN/Creatinine ratio 14 11/18/2019 08:12 AM    GFR est AA 73 11/18/2019 08:12 AM GFR est non-AA 64 11/18/2019 08:12 AM    Calcium 9.0 11/18/2019 08:12 AM    Bilirubin, total 0.4 04/17/2019 07:30 AM    Alk.  phosphatase 85 04/17/2019 07:30 AM    Protein, total 7.2 04/17/2019 07:30 AM    Albumin 4.5 04/17/2019 07:30 AM    Globulin 3.5 04/09/2013 08:08 PM    A-G Ratio 1.7 04/17/2019 07:30 AM    ALT (SGPT) 12 04/17/2019 07:30 AM     Lab Results   Component Value Date/Time    WBC 5.7 06/06/2018 07:33 PM    HGB 11.3 (L) 06/06/2018 07:33 PM    HCT 34.2 (L) 06/06/2018 07:33 PM    PLATELET 574 08/93/1740 07:33 PM    MCV 90.7 06/06/2018 07:33 PM     Lab Results   Component Value Date/Time    Cholesterol, total 168 04/17/2019 07:30 AM    HDL Cholesterol 43 04/17/2019 07:30 AM    LDL, calculated 100 (H) 04/17/2019 07:30 AM    VLDL, calculated 25 04/17/2019 07:30 AM    Triglyceride 127 04/17/2019 07:30 AM                  CARDIAC DIAGNOSTICS:        EKG 1/7/16 - sinus bradycardia, normal   EKG 1/19/17 - sinus marylin, otherwise normal   EKG 5/3/18 - sinus marylin, RBBB  EKG 6/4/20 - sinus marylin, RBBB         Carotid Duplex 7/14/16 - 0-9% RITA and 76-09% LICA  Carotid Dopplers 5/18/17 - 10-49% stenosis bilat   Exercise Cardiolite 5/18/17 - walked 4:19 (7.0 METS) - normal stress EKG and MPI.  LVEF 67%  Echo 5/18/17 - LVEF 60%, grade 1 diastology.  RV normal.  Mild MR.  RVSP 37      Venous Doppler 6/6/18 - Right lower extremity venous duplex positive for acute deep venous thrombosis involving the distal popliteal, posterior tibial and peroneal veins.      Carotid Doppler 9/20/19 - 10-49% stenosis bilat           ASSESSMENT AND PLAN:         Assessment and Plan:    1) HTN    - Doing OK lately - no sig lightheadedness   - She is tolerating Norvasc and Bystolic      2) Mild carotid artery disease   - she is on a statin   - She is on ASA - benefit for her is questionable - she is taking just sometimes and can stop if she wants       3) History HYLTON and atypical chest pain    - lexiscan cardiolite and echo were normal 5/17   - Doing well lately without any significant chest pain       4) Dyslipidemia    - we stopped atorvastatin and zeta given muscle complaints - those symptoms are better now    - she is tolerating Crestor   - recheck lipids       5) See me in 6 months with carotid dopplers.  Patient expressed understanding of the plan - questions were answered.       Kids (she had 7) and grandkids are in the area.                Cielo Short MD, [address] [place] [address], [place] [address]                [phone] [place] [address].  [address], [address] [place] [address], [address] [place] [address]  Ph: [phone]                                                             [phone]

## 2020-06-04 NOTE — PROGRESS NOTES
Visit Vitals  /76 (BP 1 Location: Left arm, BP Patient Position: Sitting)   Pulse 64   Ht 5' 2\" (1.575 m)   Wt 156 lb (70.8 kg)   SpO2 97%   BMI 28.53 kg/m²         Didn't get lab done because she was worried with covid going on    Chest pain: has  had chest tightness, doesn't last long  Shortness of breath: somewhat  Edema: right ankle  Palpitations: no  Dizziness: no    New diagnosis/Surgeries: no    ER/Hospitalizations: no  no  Refills:

## 2020-07-08 LAB
ALBUMIN SERPL-MCNC: 4.5 G/DL (ref 3.7–4.7)
ALBUMIN/GLOB SERPL: 1.8 {RATIO} (ref 1.2–2.2)
ALP SERPL-CCNC: 74 IU/L (ref 39–117)
ALT SERPL-CCNC: 16 IU/L (ref 0–32)
AST SERPL-CCNC: 27 IU/L (ref 0–40)
BASOPHILS # BLD AUTO: 0.1 X10E3/UL (ref 0–0.2)
BASOPHILS NFR BLD AUTO: 1 %
BILIRUB SERPL-MCNC: 0.5 MG/DL (ref 0–1.2)
BUN SERPL-MCNC: 12 MG/DL (ref 8–27)
BUN/CREAT SERPL: 13 (ref 12–28)
CALCIUM SERPL-MCNC: 9.7 MG/DL (ref 8.7–10.3)
CHLORIDE SERPL-SCNC: 105 MMOL/L (ref 96–106)
CHOLEST SERPL-MCNC: 151 MG/DL (ref 100–199)
CO2 SERPL-SCNC: 23 MMOL/L (ref 20–29)
CREAT SERPL-MCNC: 0.93 MG/DL (ref 0.57–1)
EOSINOPHIL # BLD AUTO: 0.3 X10E3/UL (ref 0–0.4)
EOSINOPHIL NFR BLD AUTO: 6 %
ERYTHROCYTE [DISTWIDTH] IN BLOOD BY AUTOMATED COUNT: 13.2 % (ref 11.7–15.4)
GLOBULIN SER CALC-MCNC: 2.5 G/DL (ref 1.5–4.5)
GLUCOSE SERPL-MCNC: 104 MG/DL (ref 65–99)
HCT VFR BLD AUTO: 40 % (ref 34–46.6)
HDLC SERPL-MCNC: 44 MG/DL
HGB BLD-MCNC: 13.2 G/DL (ref 11.1–15.9)
IMM GRANULOCYTES # BLD AUTO: 0 X10E3/UL (ref 0–0.1)
IMM GRANULOCYTES NFR BLD AUTO: 0 %
INTERPRETATION, 910389: NORMAL
INTERPRETATION: NORMAL
LDLC SERPL CALC-MCNC: 89 MG/DL (ref 0–99)
LYMPHOCYTES # BLD AUTO: 1.9 X10E3/UL (ref 0.7–3.1)
LYMPHOCYTES NFR BLD AUTO: 37 %
MCH RBC QN AUTO: 29.7 PG (ref 26.6–33)
MCHC RBC AUTO-ENTMCNC: 33 G/DL (ref 31.5–35.7)
MCV RBC AUTO: 90 FL (ref 79–97)
MONOCYTES # BLD AUTO: 0.5 X10E3/UL (ref 0.1–0.9)
MONOCYTES NFR BLD AUTO: 9 %
NEUTROPHILS # BLD AUTO: 2.5 X10E3/UL (ref 1.4–7)
NEUTROPHILS NFR BLD AUTO: 47 %
PDF IMAGE, 910387: NORMAL
PLATELET # BLD AUTO: 239 X10E3/UL (ref 150–450)
POTASSIUM SERPL-SCNC: 4.3 MMOL/L (ref 3.5–5.2)
PROT SERPL-MCNC: 7 G/DL (ref 6–8.5)
RBC # BLD AUTO: 4.45 X10E6/UL (ref 3.77–5.28)
SODIUM SERPL-SCNC: 142 MMOL/L (ref 134–144)
SPECIMEN STATUS REPORT, ROLRST: NORMAL
TRIGL SERPL-MCNC: 92 MG/DL (ref 0–149)
VLDLC SERPL CALC-MCNC: 18 MG/DL (ref 5–40)
WBC # BLD AUTO: 5.2 X10E3/UL (ref 3.4–10.8)

## 2020-09-18 DIAGNOSIS — I10 ESSENTIAL HYPERTENSION: ICD-10-CM

## 2020-09-18 DIAGNOSIS — E78.00 HYPERCHOLESTEROLEMIA: ICD-10-CM

## 2020-09-22 RX ORDER — AMLODIPINE BESYLATE 5 MG/1
TABLET ORAL
Qty: 90 TAB | Refills: 2 | Status: SHIPPED | OUTPATIENT
Start: 2020-09-22 | End: 2021-06-07

## 2020-09-22 NOTE — TELEPHONE ENCOUNTER
Per Dr. Vance Xiong VO:  LOV: 6/4/20  Future Appointments   Date Time Provider Eliezer Conner   12/17/2020 11:00 AM VASCULAR, RODRIGUEZ MCHUGH   12/17/2020 11:40 AM MD LUZ EscamillaF BS AMB     Requested Prescriptions     Pending Prescriptions Disp Refills    amLODIPine (NORVASC) 5 mg tablet [Pharmacy Med Name: AMLODIPINE BESYLATE 5 MG Tablet] 90 Tab 2     Sig: TAKE 1 TABLET EVERY DAY

## 2020-10-15 RX ORDER — NEBIVOLOL 20 MG/1
TABLET ORAL
Qty: 90 TAB | Refills: 0 | Status: SHIPPED | OUTPATIENT
Start: 2020-10-15 | End: 2020-12-30

## 2020-10-15 NOTE — TELEPHONE ENCOUNTER
Per Dr. Adolfo Jones VO:  LOV:6/4/20  Future Appointments   Date Time Provider Eliezer Conner   12/17/2020 11:00 AM RODRIGUEZ FRNAZ   12/17/2020 11:40 AM MD LAURA Hoyos BS AMB     Requested Prescriptions     Pending Prescriptions Disp Refills    nebivoloL (Bystolic) 20 mg tablet 90 Tab 2

## 2020-10-15 NOTE — TELEPHONE ENCOUNTER
Pharmacy confirmed     Patient is requesting to have refills so that she does not have call for a refill every 30 days.      Patient is out of medication

## 2020-12-17 ENCOUNTER — ANCILLARY PROCEDURE (OUTPATIENT)
Dept: CARDIOLOGY CLINIC | Age: 80
End: 2020-12-17
Payer: MEDICARE

## 2020-12-17 ENCOUNTER — OFFICE VISIT (OUTPATIENT)
Dept: CARDIOLOGY CLINIC | Age: 80
End: 2020-12-17
Payer: MEDICARE

## 2020-12-17 VITALS
WEIGHT: 161 LBS | DIASTOLIC BLOOD PRESSURE: 82 MMHG | SYSTOLIC BLOOD PRESSURE: 150 MMHG | HEIGHT: 62 IN | BODY MASS INDEX: 29.63 KG/M2

## 2020-12-17 VITALS
OXYGEN SATURATION: 98 % | WEIGHT: 161 LBS | DIASTOLIC BLOOD PRESSURE: 82 MMHG | BODY MASS INDEX: 29.63 KG/M2 | HEART RATE: 56 BPM | SYSTOLIC BLOOD PRESSURE: 150 MMHG | HEIGHT: 62 IN

## 2020-12-17 DIAGNOSIS — I10 ESSENTIAL HYPERTENSION: Primary | ICD-10-CM

## 2020-12-17 DIAGNOSIS — I77.9 BILATERAL CAROTID ARTERY DISEASE, UNSPECIFIED TYPE (HCC): ICD-10-CM

## 2020-12-17 DIAGNOSIS — I65.23 BILATERAL CAROTID ARTERY STENOSIS: ICD-10-CM

## 2020-12-17 LAB
LEFT CCA DIST DIAS: 11.1 CENTIMETER/SECOND
LEFT CCA DIST SYS: 82.5 CENTIMETER/SECOND
LEFT CCA PROX DIAS: 7.9 CENTIMETER/SECOND
LEFT CCA PROX SYS: 86.7 CENTIMETER/SECOND
LEFT ECA DIAS: 6.86 CENTIMETER/SECOND
LEFT ECA SYS: 130.4 CENTIMETER/SECOND
LEFT ICA DIST DIAS: 18.5 CENTIMETER/SECOND
LEFT ICA DIST SYS: 91.7 CENTIMETER/SECOND
LEFT ICA MID DIAS: 11.5 CENTIMETER/SECOND
LEFT ICA MID SYS: 92.7 CENTIMETER/SECOND
LEFT ICA PROX DIAS: 12.5 CENTIMETER/SECOND
LEFT ICA PROX SYS: 96 CENTIMETER/SECOND
LEFT ICA/CCA SYS: 1.11
LEFT VERTEBRAL DIAS: 13 CENTIMETER/SECOND
LEFT VERTEBRAL SYS: 77.3 CENTIMETER/SECOND
RIGHT CCA DIST DIAS: 12.8 CENTIMETER/SECOND
RIGHT CCA DIST SYS: 70 CENTIMETER/SECOND
RIGHT CCA PROX DIAS: 17.5 CENTIMETER/SECOND
RIGHT CCA PROX SYS: 105 CENTIMETER/SECOND
RIGHT ECA DIAS: 6.4 CENTIMETER/SECOND
RIGHT ECA SYS: 92.2 CENTIMETER/SECOND
RIGHT ICA DIST DIAS: 20.2 CENTIMETER/SECOND
RIGHT ICA DIST SYS: 86.9 CENTIMETER/SECOND
RIGHT ICA MID DIAS: 17.7 CENTIMETER/SECOND
RIGHT ICA MID SYS: 73.7 CENTIMETER/SECOND
RIGHT ICA PROX DIAS: 15.6 CENTIMETER/SECOND
RIGHT ICA PROX SYS: 68.7 CENTIMETER/SECOND
RIGHT ICA/CCA SYS: 0.8
RIGHT VERTEBRAL DIAS: 13.47 CENTIMETER/SECOND
RIGHT VERTEBRAL SYS: 72.7 CENTIMETER/SECOND

## 2020-12-17 PROCEDURE — 93880 EXTRACRANIAL BILAT STUDY: CPT | Performed by: SPECIALIST

## 2020-12-17 PROCEDURE — 1090F PRES/ABSN URINE INCON ASSESS: CPT | Performed by: SPECIALIST

## 2020-12-17 PROCEDURE — G8536 NO DOC ELDER MAL SCRN: HCPCS | Performed by: SPECIALIST

## 2020-12-17 PROCEDURE — G8419 CALC BMI OUT NRM PARAM NOF/U: HCPCS | Performed by: SPECIALIST

## 2020-12-17 PROCEDURE — G8427 DOCREV CUR MEDS BY ELIG CLIN: HCPCS | Performed by: SPECIALIST

## 2020-12-17 PROCEDURE — G0463 HOSPITAL OUTPT CLINIC VISIT: HCPCS | Performed by: SPECIALIST

## 2020-12-17 PROCEDURE — G8400 PT W/DXA NO RESULTS DOC: HCPCS | Performed by: SPECIALIST

## 2020-12-17 PROCEDURE — G8754 DIAS BP LESS 90: HCPCS | Performed by: SPECIALIST

## 2020-12-17 PROCEDURE — 1101F PT FALLS ASSESS-DOCD LE1/YR: CPT | Performed by: SPECIALIST

## 2020-12-17 PROCEDURE — G8432 DEP SCR NOT DOC, RNG: HCPCS | Performed by: SPECIALIST

## 2020-12-17 PROCEDURE — 99214 OFFICE O/P EST MOD 30 MIN: CPT | Performed by: SPECIALIST

## 2020-12-17 PROCEDURE — G8753 SYS BP > OR = 140: HCPCS | Performed by: SPECIALIST

## 2020-12-17 NOTE — PROGRESS NOTES
Deisy Phelan MD. [facility] [place]              Patient: Melissa Cuellar  : [id number]      Today's Date: 2020          HISTORY OF PRESENT ILLNESS:     History of Present Illness:  Doing well. No cardiac complaints. BP usually looks good 130-140/70 or so. No CP or SOB. No passing out. PAST MEDICAL HISTORY:     Past Medical History:   Diagnosis Date    Arthritis     Asthma     albuterol    Carotid artery disease ([address]     mild    DVT [facility]deep venous thrombosis) ([facility])     Acute DVT 18 after Knee surgery 5/15/18     Elevated glucose     borderline elevated A1c    GERD (gastroesophageal reflux disease)     Hypercholesterolemia     Hypertension     Nausea & vomiting     MALINI on CPAP     Shingles          Past Surgical History:   Procedure Laterality Date    ECHO STRESS      normal resting LV wall motion and no evidence of ischemia. The ejection fraction was 50-60%.  HOLTER MONITOR      frequent ventricular extrasystoles. Rare couplets and triplets. NSR during diary entries.      HX CATARACT REMOVAL Left 2019    HX HYSTERECTOMY  1986    HX ORTHOPAEDIC      Right ROTATER CUFF SURGERY     HX ORTHOPAEDIC      R KNEE MENISCUS    HX ORTHOPAEDIC  2013    left tibia repair    HX ORTHOPAEDIC  2012    LEFT ROTATER CUFF REPAIR     HX ORTHOPAEDIC  2009    THUMB TRIGGER FINGER RELEASE    HX OTHER SURGICAL      CARPAL TUNNEL BILATERAL, TRIGGER THUMB RELEASE    HX OTHER SURGICAL      Carotid dopplers - mild disease bilat    HX OTHER SURGICAL      Lexiscan Cardiolite 12 - normal; LVEF 65%    HX OTHER SURGICAL      Echo 12 - TDS, LVEF 60%, no sig valve disease, RVSP 25    HX OTHER SURGICAL      Carotid Duplex 3/14/13 - 10-49% stenosis bilat     HX OTHER SURGICAL      LE ZE's 3/14/13 - normal ZE's              MEDICATIONS:     Current Outpatient Medications   Medication Sig Dispense Refill    nebivoloL (Bystolic) 20 mg tablet TAKE ONE TABLET BY MOUTH DAILY 90 Tab 0    amLODIPine (NORVASC) 5 mg tablet TAKE 1 TABLET EVERY DAY 90 Tab 2    rosuvastatin (CRESTOR) 10 mg tablet TAKE ONE TABLET BY MOUTH EVERY NIGHT 90 Tab 3    multivitamin (ONE A DAY) tablet Take 1 Tab by mouth daily.  albuterol (PROVENTIL HFA, VENTOLIN HFA, PROAIR HFA) 90 mcg/actuation inhaler Take 2 Puffs by inhalation as needed for Wheezing or Shortness of Breath.  loratadine (CLARITIN) 10 mg tablet Take 10 mg by mouth daily as needed.  cyanocobalamin (VITAMIN B-12) 1,000 mcg/mL injection 1,000 mcg by IntraMUSCular route once. MONTHLY, APPROX 3/2018      aspirin delayed-release 81 mg tablet Take 1 Tab by mouth daily.  30 Tab 0       Allergies   Allergen Reactions    Azithromycin Shortness of Breath     Heart slows down  Heart slows down    Tetanus Toxoid, Adsorbed Shortness of Breath    Adhesive Tape-Silicones Rash    Codeine Hives     N/V  N/V    Dilaudid [Hydromorphone (Bulk)] Nausea and Vomiting    Dilaudid [Hydromorphone] Unknown (comments)     Other reaction(s): Unknown (comments)    Ivp Dye [Fd And C Blue No.1] Hives     SWELLING    Metrizamide Hives    Novocain [Procaine] Unknown (comments)     Other reaction(s): Unknown (comments)    Sectral [Acebutolol] Hives and Swelling    Sulfa (Sulfonamide Antibiotics) Nausea and Vomiting     And hives    Sulfasalazine Hives    Talwin [Pentazocine Lactate] Swelling    Tetanus Vaccines And Toxoid Shortness of Breath    Vasotec [Enalapril Maleate] Other (comments)     ELEVATED BP             SOCIAL HISTORY:     Social History     Tobacco Use    Smoking status: Never Smoker    Smokeless tobacco: Never Used   Substance Use Topics    Alcohol use: No     Alcohol/week: 0.0 standard drinks    Drug use: No           FAMILY HISTORY:     Family History   Problem Relation Age of Onset    Cancer Mother         LUNG AND CERVICAL CANCER    Heart Disease Father 46    Coronary Artery Disease Father     Heart Attack Father  Dementia Sister     Heart Disease Brother     Cancer Brother         COLON    Cancer Brother         THROAT CANCER    Diabetes Brother     Lung Disease Brother     Sleep Apnea Brother     Anesth Problems Neg Hx             REVIEW OF SYSTEMS:         Review of Systems:    Constitutional: Negative for fever, chills .  Stays cold.    HEENT: Negative for vision changes.    Respiratory: no cough    Cardiovascular: Negative for orthopnea, syncope, and PND.    Gastrointestinal: Negative for abdominal pain,or melena  + constipation   Genitourinary: Negative for dysuria    Musculoskeletal: Negative for myalgias.  + knee pain   Skin: Negative for rash    Heme: No problems bleeding.    Neurological: Negative for speech change and focal weakness.    + restless leg syndrome    + Urinary frequency               PHYSICAL EXAM:         Physical Exam:        Visit Vitals  BP (!) 150/82 (BP 1 Location: Left arm, BP Patient Position: Sitting)   Pulse (!) 56   Ht 5' 2\" (1.575 m)   Wt 161 lb (73 kg)   SpO2 98%   BMI 29.45 kg/m²          Patient appears generally well, mood and affect are appropriate and pleasant.    HEENT: Normocephalic, atraumatic. Ninoska Litten anicteric.  Hearing intact.    Neck Exam: Supple, + left neck bruit   Lung Exam: Clear to auscultation, even breath sounds.    Cardiac Exam: Regular rate and rhythm with 1/6 systolic murmur    Abdomen: Soft, non-tender, normal bowel sounds.    Extremities: No lower extremity edema.    Psych - appropriate affect    Neuro - non focal           LABS / OTHER STUDIES:           Lab Results   Component Value Date/Time    Sodium 142 07/07/2020 08:07 AM    Potassium 4.3 07/07/2020 08:07 AM    Chloride 105 07/07/2020 08:07 AM    CO2 23 07/07/2020 08:07 AM    Anion gap 4 (L) 06/06/2018 07:33 PM    Glucose 104 (H) 07/07/2020 08:07 AM    BUN 12 07/07/2020 08:07 AM    Creatinine 0.93 07/07/2020 08:07 AM    BUN/Creatinine ratio 13 07/07/2020 08:07 AM    GFR est AA 68 07/07/2020 08:07 AM    GFR est non-AA 59 (L) 07/07/2020 08:07 AM    Calcium 9.7 07/07/2020 08:07 AM    Bilirubin, total 0.5 07/07/2020 08:07 AM    Alk.  phosphatase 74 07/07/2020 08:07 AM    Protein, total 7.0 07/07/2020 08:07 AM    Albumin 4.5 07/07/2020 08:07 AM    Globulin 3.5 04/09/2013 08:08 PM    A-G Ratio 1.8 07/07/2020 08:07 AM    ALT (SGPT) 16 07/07/2020 08:07 AM    AST (SGOT) 27 07/07/2020 08:07 AM     Lab Results   Component Value Date/Time    WBC 5.2 07/07/2020 08:07 AM    HGB 13.2 07/07/2020 08:07 AM    HCT 40.0 07/07/2020 08:07 AM    PLATELET 082 18/81/3818 08:07 AM    MCV 90 07/07/2020 08:07 AM         Lab Results   Component Value Date/Time    Cholesterol, total 151 07/07/2020 08:07 AM    HDL Cholesterol 44 07/07/2020 08:07 AM    LDL, calculated 89 07/07/2020 08:07 AM    VLDL, calculated 18 07/07/2020 08:07 AM    Triglyceride 92 07/07/2020 08:07 AM     Labs 7/20 - CBC OK, CMP OK,  Chol 151, HDL 44, LDL 82, , TSH 1.2, A1c 5.4             CARDIAC DIAGNOSTICS:        EKG 1/7/16 - sinus bradycardia, normal   EKG 1/19/17 - sinus marylin, otherwise normal   EKG 5/3/18 - sinus marylin, RBBB  EKG 6/4/20 - sinus marylin, RBBB         Carotid Duplex 7/14/16 - 0-9% RITA and 86-02% LICA  Carotid Dopplers 5/18/17 - 10-49% stenosis bilat   Exercise Cardiolite 5/18/17 - walked 4:19 (7.0 METS) - normal stress EKG and MPI.  LVEF 67%  Echo 5/18/17 - LVEF 60%, grade 1 diastology.  RV normal.  Mild MR.  RVSP 37      Venous Doppler 6/6/18 - Right lower extremity venous duplex positive for acute deep venous thrombosis involving the distal popliteal, posterior tibial and peroneal veins.      Carotid Doppler 9/20/19 - 10-49% stenosis bilat     Carotid Doppler 12/17/20 - 10-49% bilat ICA stenosis             ASSESSMENT AND PLAN:         Assessment and Plan:    1) HTN    - BP a little high today but usually OK    - She is tolerating Norvasc and Bystolic  - Will have her follow BP at home and call me if BP is high       2) Mild carotid artery disease   - she is on a statin       3) History HYLTON and atypical chest pain    - lexiscan cardiolite and echo were normal 5/17   - Doing well lately without any significant chest pain       4) Dyslipidemia    - we stopped atorvastatin and zeta given muscle complaints - those symptoms are better now    - she is tolerating Crestor   - recent lipids OK       5) See me in 6 months.  Patient expressed understanding of the plan - questions were answered.       Kids (she had 7) and grandkids are in the area.                Monalisa Parsons MD, [address] [place] [address], [place] [address]                [phone] [address] [place] [address].  [address] [place] [address], [address] [place] [address], [address] [place] [address]  Ph: [phone]                                                              [phone]

## 2020-12-17 NOTE — PROGRESS NOTES
Gabriella Patel is a [de-identified] y.o. female    Visit Vitals  BP (!) 150/82 (BP 1 Location: Left arm, BP Patient Position: Sitting)   Pulse (!) 56   Ht 5' 2\" (1.575 m)   Wt 161 lb (73 kg)   SpO2 98%   BMI 29.45 kg/m²       No chief complaint on file.       Chest pain NO  SOB NO  Dizziness NO  Swelling NO  Recent hospital visit NO  Refills NO

## 2020-12-30 RX ORDER — NEBIVOLOL 20 MG/1
TABLET ORAL
Qty: 90 TAB | Refills: 1 | Status: SHIPPED | OUTPATIENT
Start: 2020-12-30 | End: 2021-01-15

## 2020-12-30 NOTE — TELEPHONE ENCOUNTER
Refill per VO of Dr. Kaminski Deacon:  Last appt: 12/17/2020  Future Appointments   Date Time Provider Eliezer Dalila   6/24/2021 10:20 AM Juan Miguel Rodriguez MD [facility] BS AMB       Requested Prescriptions     Signed Prescriptions Disp Refills    nebivoloL (Bystolic) 20 mg tablet 90 Tab 1     Sig: TAKE ONE TABLET BY MOUTH DAILY     Authorizing Provider: Gifty Morris     Ordering User: Maty Chang

## 2021-01-15 RX ORDER — NEBIVOLOL 20 MG/1
TABLET ORAL
Qty: 90 TAB | Refills: 1 | Status: SHIPPED | OUTPATIENT
Start: 2021-01-15 | End: 2021-12-28

## 2021-01-15 NOTE — TELEPHONE ENCOUNTER
Refill per VO of Dr. Robby Archuleta:  Last appt: 12/17/2020  Future Appointments   Date Time Provider Eliezer Conner   6/24/2021 10:20 AM Jorge Waggoner MD [facility] BS AMB       Requested Prescriptions     Signed Prescriptions Disp Refills    nebivoloL (Bystolic) 20 mg tablet 90 Tab 1     Sig: TAKE ONE TABLET BY MOUTH DAILY     Authorizing Provider: Alen Jackson     Ordering User: Andrey Green

## 2021-06-05 DIAGNOSIS — I10 ESSENTIAL HYPERTENSION: ICD-10-CM

## 2021-06-05 DIAGNOSIS — E78.00 HYPERCHOLESTEROLEMIA: ICD-10-CM

## 2021-06-07 RX ORDER — AMLODIPINE BESYLATE 5 MG/1
TABLET ORAL
Qty: 90 TABLET | Refills: 0 | Status: SHIPPED | OUTPATIENT
Start: 2021-06-07 | End: 2021-09-17

## 2021-06-07 NOTE — TELEPHONE ENCOUNTER
Refill per VO of Dr. Reyes Salts:  Last appt: 12/17/2020  Future Appointments   Date Time Provider Eliezer Dalila   6/24/2021 10:20 AM Anand Lazaro MD [facility] BS AMB       Requested Prescriptions     Signed Prescriptions Disp Refills    amLODIPine (NORVASC) 5 mg tablet 90 Tablet 0     Sig: TAKE 1 TABLET EVERY DAY     Authorizing Provider: Mary Hernadnez     Ordering User: Juan Alfaro

## 2021-06-24 ENCOUNTER — OFFICE VISIT (OUTPATIENT)
Dept: CARDIOLOGY CLINIC | Age: 81
End: 2021-06-24
Payer: MEDICARE

## 2021-06-24 VITALS
WEIGHT: 166.4 LBS | BODY MASS INDEX: 30.62 KG/M2 | DIASTOLIC BLOOD PRESSURE: 70 MMHG | RESPIRATION RATE: 16 BRPM | HEART RATE: 60 BPM | HEIGHT: 62 IN | OXYGEN SATURATION: 98 % | SYSTOLIC BLOOD PRESSURE: 136 MMHG

## 2021-06-24 DIAGNOSIS — R06.09 DOE (DYSPNEA ON EXERTION): ICD-10-CM

## 2021-06-24 DIAGNOSIS — R07.9 CHEST PAIN, UNSPECIFIED TYPE: ICD-10-CM

## 2021-06-24 DIAGNOSIS — E78.5 DYSLIPIDEMIA: ICD-10-CM

## 2021-06-24 DIAGNOSIS — I77.9 BILATERAL CAROTID ARTERY DISEASE, UNSPECIFIED TYPE (HCC): ICD-10-CM

## 2021-06-24 DIAGNOSIS — R06.02 SOB (SHORTNESS OF BREATH): ICD-10-CM

## 2021-06-24 DIAGNOSIS — R53.83 FATIGUE, UNSPECIFIED TYPE: Primary | ICD-10-CM

## 2021-06-24 DIAGNOSIS — I10 ESSENTIAL HYPERTENSION: ICD-10-CM

## 2021-06-24 PROCEDURE — G8510 SCR DEP NEG, NO PLAN REQD: HCPCS | Performed by: SPECIALIST

## 2021-06-24 PROCEDURE — G8752 SYS BP LESS 140: HCPCS | Performed by: SPECIALIST

## 2021-06-24 PROCEDURE — 1090F PRES/ABSN URINE INCON ASSESS: CPT | Performed by: SPECIALIST

## 2021-06-24 PROCEDURE — G8536 NO DOC ELDER MAL SCRN: HCPCS | Performed by: SPECIALIST

## 2021-06-24 PROCEDURE — 1101F PT FALLS ASSESS-DOCD LE1/YR: CPT | Performed by: SPECIALIST

## 2021-06-24 PROCEDURE — 93010 ELECTROCARDIOGRAM REPORT: CPT | Performed by: SPECIALIST

## 2021-06-24 PROCEDURE — G8427 DOCREV CUR MEDS BY ELIG CLIN: HCPCS | Performed by: SPECIALIST

## 2021-06-24 PROCEDURE — G8754 DIAS BP LESS 90: HCPCS | Performed by: SPECIALIST

## 2021-06-24 PROCEDURE — G0463 HOSPITAL OUTPT CLINIC VISIT: HCPCS | Performed by: SPECIALIST

## 2021-06-24 PROCEDURE — 93005 ELECTROCARDIOGRAM TRACING: CPT | Performed by: SPECIALIST

## 2021-06-24 PROCEDURE — G8400 PT W/DXA NO RESULTS DOC: HCPCS | Performed by: SPECIALIST

## 2021-06-24 PROCEDURE — 99214 OFFICE O/P EST MOD 30 MIN: CPT | Performed by: SPECIALIST

## 2021-06-24 PROCEDURE — G8417 CALC BMI ABV UP PARAM F/U: HCPCS | Performed by: SPECIALIST

## 2021-06-24 NOTE — PROGRESS NOTES
Ree Flowers MD. [facility] [place]              Patient: Alphonso Avendaño  :       Today's Date: 2021          HISTORY OF PRESENT ILLNESS:     History of Present Illness:  Doing well overall. Stays active, but tired since she had her COVID vaccine  - able to do what she needs. Some HYLTON. Has some chronic, atypical CP - no changes. PAST MEDICAL HISTORY:     Past Medical History:   Diagnosis Date    Arthritis     Asthma     albuterol    Carotid artery disease ([address]     mild    DVT [facility]deep venous thrombosis) ([facility])     Acute DVT 18 after Knee surgery 5/15/18     Elevated glucose     borderline elevated A1c    GERD (gastroesophageal reflux disease)     Hypercholesterolemia     Hypertension     Nausea & vomiting     MALINI on CPAP     Shingles        Past Surgical History:   Procedure Laterality Date    ECHO STRESS      normal resting LV wall motion and no evidence of ischemia. The ejection fraction was 50-60%.  HOLTER MONITOR      frequent ventricular extrasystoles. Rare couplets and triplets. NSR during diary entries.      HX CATARACT REMOVAL Left 2019    HX HYSTERECTOMY      HX ORTHOPAEDIC      Right ROTATER CUFF SURGERY     HX ORTHOPAEDIC      R KNEE MENISCUS    HX ORTHOPAEDIC  2013    left tibia repair    HX ORTHOPAEDIC  2012    LEFT ROTATER CUFF REPAIR     HX ORTHOPAEDIC  2009    THUMB TRIGGER FINGER RELEASE    HX OTHER SURGICAL      CARPAL TUNNEL BILATERAL, TRIGGER THUMB RELEASE    HX OTHER SURGICAL      Carotid dopplers - mild disease bilat    HX OTHER SURGICAL      Lexiscan Cardiolite 12 - normal; LVEF 65%    HX OTHER SURGICAL      Echo 12 - TDS, LVEF 60%, no sig valve disease, RVSP 25    HX OTHER SURGICAL      Carotid Duplex 3/14/13 - 10-49% stenosis bilat     HX OTHER SURGICAL      LE ZE's 3/14/13 - normal ZE's          MEDICATIONS:     Current Outpatient Medications   Medication Sig Dispense Refill    amLODIPine (NORVASC) 5 mg tablet TAKE 1 TABLET EVERY DAY 90 Tablet 0    nebivoloL (Bystolic) 20 mg tablet TAKE ONE TABLET BY MOUTH DAILY 90 Tab 1    rosuvastatin (CRESTOR) 10 mg tablet TAKE ONE TABLET BY MOUTH EVERY NIGHT 90 Tab 3    multivitamin (ONE A DAY) tablet Take 1 Tab by mouth daily.  albuterol (PROVENTIL HFA, VENTOLIN HFA, PROAIR HFA) 90 mcg/actuation inhaler Take 2 Puffs by inhalation as needed for Wheezing or Shortness of Breath.  loratadine (CLARITIN) 10 mg tablet Take 10 mg by mouth daily as needed.  cyanocobalamin (VITAMIN B-12) 1,000 mcg/mL injection 1,000 mcg by IntraMUSCular route once.  MONTHLY, APPROX 3/2018         Allergies   Allergen Reactions    Azithromycin Shortness of Breath     Heart slows down  Heart slows down    Tetanus Toxoid, Adsorbed Shortness of Breath    Adhesive Tape-Silicones Rash    Codeine Hives     N/V  N/V    Dilaudid [Hydromorphone (Bulk)] Nausea and Vomiting    Dilaudid [Hydromorphone] Unknown (comments)     Other reaction(s): Unknown (comments)    Ivp Dye [Fd And C Blue No.1] Hives     SWELLING    Metrizamide Hives    Novocain [Procaine] Unknown (comments)     Other reaction(s): Unknown (comments)    Sectral [Acebutolol] Hives and Swelling    Sulfa (Sulfonamide Antibiotics) Nausea and Vomiting     And hives    Sulfasalazine Hives    Talwin [Pentazocine Lactate] Swelling    Tetanus Vaccines And Toxoid Shortness of Breath    Vasotec [Enalapril Maleate] Other (comments)     ELEVATED BP           SOCIAL HISTORY:     Social History     Tobacco Use    Smoking status: Never Smoker    Smokeless tobacco: Never Used   Substance Use Topics    Alcohol use: No     Alcohol/week: 0.0 standard drinks    Drug use: No         FAMILY HISTORY:     Family History   Problem Relation Age of Onset    Cancer Mother         LUNG AND CERVICAL CANCER    Heart Disease Father 46    Coronary Artery Disease Father     Heart Attack Father     Dementia Sister    [facility] Heart Disease Brother     Cancer Brother         COLON    Cancer Brother         THROAT CANCER    Diabetes Brother     Lung Disease Brother     Sleep Apnea Brother     Anesth Problems Neg Hx            SOCIAL HISTORY:     Social History     Tobacco Use    Smoking status: Never Smoker    Smokeless tobacco: Never Used   Substance Use Topics    Alcohol use: No     Alcohol/week: 0.0 standard drinks    Drug use: No         FAMILY HISTORY:     Family History   Problem Relation Age of Onset    Cancer Mother         LUNG AND CERVICAL CANCER    Heart Disease Father 46    Coronary Artery Disease Father     Heart Attack Father     Dementia Sister     Heart Disease Brother     Cancer Brother         COLON    Cancer Brother         THROAT CANCER    Diabetes Brother     Lung Disease Brother     Sleep Apnea Brother     Anesth Problems Neg Hx            REVIEW OF SYMPTOMS:     Review of Symptoms:  Constitutional: Negative for fever, chills .  Stays cold.    HEENT: Negative for vision changes.    Respiratory: no cough    Cardiovascular: Negative for orthopnea, syncope, and PND.    Gastrointestinal: Negative for abdominal pain,or melena  + constipation   Genitourinary: Negative for dysuria    Musculoskeletal: Negative for myalgias.  + knee pain   Skin: Negative for rash    Heme: No problems bleeding.    Neurological: Negative for speech change and focal weakness.    + restless leg syndrome    + Urinary frequency        PHYSICAL EXAM:     Physical Exam:  Visit Vitals  /70 (BP 1 Location: Right arm, BP Patient Position: Sitting, BP Cuff Size: Large adult)   Pulse 60   Resp 16   Ht 5' 2\" (1.575 m)   Wt 166 lb 6.4 oz (75.5 kg)   SpO2 98%   BMI 30.43 kg/m²     Patient appears generally well, mood and affect are appropriate and pleasant.    HEENT: Normocephalic, atraumatic. [place] anicteric.  Hearing intact.    Neck Exam: Supple, no JVD  Lung Exam: Clear to auscultation, even breath sounds.    Cardiac Exam: Regular rate and rhythm with no murmur or rub   Abdomen: Soft, non-tender, normal bowel sounds.    Extremities: No lower extremity edema.    Psych - appropriate affect    Neuro - non focal         LABS / OTHER STUDIES reviewed:     Lab Results   Component Value Date/Time    Sodium 142 07/07/2020 08:07 AM    Potassium 4.3 07/07/2020 08:07 AM    Chloride 105 07/07/2020 08:07 AM    CO2 23 07/07/2020 08:07 AM    Anion gap 4 (L) 06/06/2018 07:33 PM    Glucose 104 (H) 07/07/2020 08:07 AM    BUN 12 07/07/2020 08:07 AM    Creatinine 0.93 07/07/2020 08:07 AM    BUN/Creatinine ratio 13 07/07/2020 08:07 AM    GFR est AA 68 07/07/2020 08:07 AM    GFR est non-AA 59 (L) 07/07/2020 08:07 AM    Calcium 9.7 07/07/2020 08:07 AM    Bilirubin, total 0.5 07/07/2020 08:07 AM    Alk. phosphatase 74 07/07/2020 08:07 AM    Protein, total 7.0 07/07/2020 08:07 AM    Albumin 4.5 07/07/2020 08:07 AM    Globulin 3.5 04/09/2013 08:08 PM    A-G Ratio 1.8 07/07/2020 08:07 AM    ALT (SGPT) 16 07/07/2020 08:07 AM    AST (SGOT) 27 07/07/2020 08:07 AM     Lab Results   Component Value Date/Time    WBC 5.2 07/07/2020 08:07 AM    HGB 13.2 07/07/2020 08:07 AM    HCT 40.0 07/07/2020 08:07 AM    PLATELET 513 85/40/3203 08:07 AM    MCV 90 07/07/2020 08:07 AM       Lab Results   Component Value Date/Time    Cholesterol, total 151 07/07/2020 08:07 AM    HDL Cholesterol 44 07/07/2020 08:07 AM    LDL, calculated 89 07/07/2020 08:07 AM    VLDL, calculated 18 07/07/2020 08:07 AM    Triglyceride 92 07/07/2020 08:07 AM             CARDIAC DIAGNOSTICS:     Cardiac Evaluation Includes:  I reviewed the results below.      EKG 1/7/16 - sinus bradycardia, normal   EKG 1/19/17 - sinus marylin, otherwise normal   EKG 5/3/18 - sinus marylin, RBBB  EKG 6/4/20 - sinus marylin, RBBB  EKG 6/24/21 - sinus marylin, RBBB         Carotid Duplex 7/14/16 - 0-9% RITA and 92-76% LICA  Carotid Dopplers 5/18/17 - 10-49% stenosis bilat   Exercise Cardiolite 5/18/17 - walked 4:19 (7.0 METS) - normal stress EKG and MPI.  LVEF 67%  Echo 5/18/17 - LVEF 60%, grade 1 diastology.  RV normal.  Mild MR.  RVSP 37      Venous Doppler 6/6/18 - Right lower extremity venous duplex positive for acute deep venous thrombosis involving the distal popliteal, posterior tibial and peroneal veins.      Carotid Doppler 9/20/19 - 10-49% stenosis bilat      Carotid Doppler 12/17/20 - 10-49% bilat ICA stenosis            ASSESSMENT AND PLAN:     Assessment and Plan:    1) HTN    - BP is OK at home   - She is tolerating Norvasc and Bystolic which we'll continue      2) Mild carotid artery disease   - Cont statin   - check carotids periodically       3) History HYLTON and atypical chest pain    - lexiscan cardiolite and echo were normal 5/17   - Doing well lately without any significant changes - still with HYLTON and occasional CP  - Will check an echo and exercise cardiolite (she would like to get things checked as is more tired)       4) Dyslipidemia    - we stopped atorvastatin and zeta given muscle complaints - those symptoms are better now    - she is tolerating Crestor   - prior lipids OK       5) See me in 6 months.  Patient expressed understanding of the plan - questions were answered.       Kids (she had 7) and grandkids are in the area.             Yadira Rodriguez MD, [place] [address], [address]  [address] [place] [address], [facility] [place]. Theo Vides  [place], [address] [place] [address]  Ph: [phone]   Ph [phone]      ADDENDUM   7/26/2021    Lexiscan Cardiolite 7/22/21 - normal MPI, LVEF 70%  Echo 7/22/21 - LVEF 60%, mild LAE, Asc Ao mildly dilated at 3.4 cm     Echo and stress test look OK. Stable. No signs of ischemia. Will have nurse call.

## 2021-06-24 NOTE — PROGRESS NOTES
Orders for Echo and exercise cardiolite (hold Bystolic) when possible   See me in 6 months per Dr. Vickey RANDOLPH.   Dx: alvarado, cp

## 2021-07-05 LAB
ALBUMIN SERPL-MCNC: 4.1 G/DL (ref 3.7–4.7)
ALBUMIN/GLOB SERPL: 1.5 {RATIO} (ref 1.2–2.2)
ALP SERPL-CCNC: 82 IU/L (ref 48–121)
ALT SERPL-CCNC: 9 IU/L (ref 0–32)
AST SERPL-CCNC: 17 IU/L (ref 0–40)
BILIRUB SERPL-MCNC: 0.5 MG/DL (ref 0–1.2)
BUN SERPL-MCNC: 12 MG/DL (ref 8–27)
BUN/CREAT SERPL: 13 (ref 12–28)
CALCIUM SERPL-MCNC: 9.5 MG/DL (ref 8.7–10.3)
CHLORIDE SERPL-SCNC: 104 MMOL/L (ref 96–106)
CHOLEST SERPL-MCNC: 160 MG/DL (ref 100–199)
CO2 SERPL-SCNC: 23 MMOL/L (ref 20–29)
CREAT SERPL-MCNC: 0.96 MG/DL (ref 0.57–1)
ERYTHROCYTE [DISTWIDTH] IN BLOOD BY AUTOMATED COUNT: 14 % (ref 11.7–15.4)
GLOBULIN SER CALC-MCNC: 2.7 G/DL (ref 1.5–4.5)
GLUCOSE SERPL-MCNC: 110 MG/DL (ref 65–99)
HCT VFR BLD AUTO: 38.1 % (ref 34–46.6)
HDLC SERPL-MCNC: 39 MG/DL
HGB BLD-MCNC: 12.9 G/DL (ref 11.1–15.9)
IMP & REVIEW OF LAB RESULTS: NORMAL
INTERPRETATION: NORMAL
LDLC SERPL CALC-MCNC: 99 MG/DL (ref 0–99)
MCH RBC QN AUTO: 30.4 PG (ref 26.6–33)
MCHC RBC AUTO-ENTMCNC: 33.9 G/DL (ref 31.5–35.7)
MCV RBC AUTO: 90 FL (ref 79–97)
PLATELET # BLD AUTO: 257 X10E3/UL (ref 150–450)
POTASSIUM SERPL-SCNC: 4.2 MMOL/L (ref 3.5–5.2)
PROT SERPL-MCNC: 6.8 G/DL (ref 6–8.5)
RBC # BLD AUTO: 4.24 X10E6/UL (ref 3.77–5.28)
SODIUM SERPL-SCNC: 141 MMOL/L (ref 134–144)
TRIGL SERPL-MCNC: 119 MG/DL (ref 0–149)
TSH SERPL DL<=0.005 MIU/L-ACNC: 1.56 UIU/ML (ref 0.45–4.5)
VLDLC SERPL CALC-MCNC: 22 MG/DL (ref 5–40)
WBC # BLD AUTO: 5.6 X10E3/UL (ref 3.4–10.8)

## 2021-07-22 ENCOUNTER — ANCILLARY PROCEDURE (OUTPATIENT)
Dept: CARDIOLOGY CLINIC | Age: 81
End: 2021-07-22
Payer: MEDICARE

## 2021-07-22 VITALS
DIASTOLIC BLOOD PRESSURE: 74 MMHG | BODY MASS INDEX: 30.55 KG/M2 | SYSTOLIC BLOOD PRESSURE: 130 MMHG | HEIGHT: 62 IN | WEIGHT: 166 LBS

## 2021-07-22 VITALS — WEIGHT: 166 LBS | BODY MASS INDEX: 30.55 KG/M2 | HEIGHT: 62 IN

## 2021-07-22 DIAGNOSIS — R06.02 SOB (SHORTNESS OF BREATH): ICD-10-CM

## 2021-07-22 DIAGNOSIS — R07.9 CHEST PAIN, UNSPECIFIED TYPE: ICD-10-CM

## 2021-07-22 DIAGNOSIS — R06.09 DOE (DYSPNEA ON EXERTION): ICD-10-CM

## 2021-07-22 DIAGNOSIS — R53.83 FATIGUE, UNSPECIFIED TYPE: ICD-10-CM

## 2021-07-22 DIAGNOSIS — E78.5 DYSLIPIDEMIA: ICD-10-CM

## 2021-07-22 DIAGNOSIS — I77.9 BILATERAL CAROTID ARTERY DISEASE, UNSPECIFIED TYPE (HCC): ICD-10-CM

## 2021-07-22 DIAGNOSIS — I10 ESSENTIAL HYPERTENSION: ICD-10-CM

## 2021-07-22 PROCEDURE — 78452 HT MUSCLE IMAGE SPECT MULT: CPT | Performed by: SPECIALIST

## 2021-07-22 PROCEDURE — 93306 TTE W/DOPPLER COMPLETE: CPT | Performed by: SPECIALIST

## 2021-07-22 PROCEDURE — 93018 CV STRESS TEST I&R ONLY: CPT | Performed by: SPECIALIST

## 2021-07-22 PROCEDURE — 93017 CV STRESS TEST TRACING ONLY: CPT | Performed by: SPECIALIST

## 2021-07-22 PROCEDURE — 93016 CV STRESS TEST SUPVJ ONLY: CPT | Performed by: SPECIALIST

## 2021-07-22 RX ORDER — AMINOPHYLLINE 25 MG/ML
100 INJECTION, SOLUTION INTRAVENOUS ONCE
Status: COMPLETED | OUTPATIENT
Start: 2021-07-22 | End: 2021-07-22

## 2021-07-22 RX ADMIN — REGADENOSON 0.4 MG: 0.08 INJECTION, SOLUTION INTRAVENOUS at 10:26

## 2021-07-22 RX ADMIN — AMINOPHYLLINE 100 MG: 25 INJECTION, SOLUTION INTRAVENOUS at 11:06

## 2021-07-25 LAB
ECHO AO ASC DIAM: 3.36 CM
ECHO AO ROOT DIAM: 3.31 CM
ECHO AV MEAN GRADIENT: 3.47 MMHG
ECHO AV PEAK GRADIENT: 7.19 MMHG
ECHO AV PEAK VELOCITY: 134.08 CM/S
ECHO AV VTI: 27.71 CM
ECHO EST RA PRESSURE: 3 MMHG
ECHO LA AREA 4C: 21.63 CM2
ECHO LA MAJOR AXIS: 4.07 CM
ECHO LA MINOR AXIS: 2.3 CM
ECHO LA VOL 2C: 75.35 ML (ref 22–52)
ECHO LA VOL 4C: 66.33 ML (ref 22–52)
ECHO LA VOL BP: 77.84 ML (ref 22–52)
ECHO LA VOL/BSA BIPLANE: 43.98 ML/M2 (ref 16–28)
ECHO LA VOLUME INDEX A2C: 42.57 ML/M2 (ref 16–28)
ECHO LA VOLUME INDEX A4C: 37.47 ML/M2 (ref 16–28)
ECHO LV E' SEPTAL VELOCITY: 5.38 CM/S
ECHO LV INTERNAL DIMENSION DIASTOLIC: 4.97 CM (ref 3.9–5.3)
ECHO LV INTERNAL DIMENSION SYSTOLIC: 3.31 CM
ECHO LV IVSD: 0.87 CM (ref 0.6–0.9)
ECHO LV MASS 2D: 150.9 G (ref 67–162)
ECHO LV MASS INDEX 2D: 85.3 G/M2 (ref 43–95)
ECHO LV POSTERIOR WALL DIASTOLIC: 0.88 CM (ref 0.6–0.9)
ECHO MV A VELOCITY: 77.17 CM/S
ECHO MV E DECELERATION TIME (DT): 246.26 MS
ECHO MV E VELOCITY: 62.13 CM/S
ECHO MV E/A RATIO: 0.81
ECHO MV E/E' SEPTAL: 11.55
ECHO MV MAX VELOCITY: 86.59 CM/S
ECHO MV MEAN GRADIENT: 1.01 MMHG
ECHO MV PEAK GRADIENT: 3 MMHG
ECHO MV PRESSURE HALF TIME (PHT): 71.41 MS
ECHO MV VTI: 23.96 CM
ECHO PV REGURGITANT MAX VELOCITY: 94.37 CM/S
ECHO RA AREA 4C: 14.58 CM2
ECHO RIGHT VENTRICULAR SYSTOLIC PRESSURE (RVSP): 32.56 MMHG
ECHO RV INTERNAL DIMENSION: 3.48 CM
ECHO RV TAPSE: 2.1 CM (ref 1.5–2)
ECHO TV REGURGITANT MAX VELOCITY: 271.86 CM/S
ECHO TV REGURGITANT PEAK GRADIENT: 29.56 MMHG

## 2021-07-26 LAB
STRESS BASELINE DIAS BP: 74 MMHG
STRESS BASELINE HR: 57 BPM
STRESS BASELINE SYS BP: 130 MMHG
STRESS ESTIMATED WORKLOAD: 1 METS
STRESS EXERCISE DUR MIN: NORMAL
STRESS O2 SAT PEAK: 99 %
STRESS O2 SAT REST: 98 %
STRESS PEAK DIAS BP: 80 MMHG
STRESS PEAK SYS BP: 140 MMHG
STRESS PERCENT HR ACHIEVED: 70 %
STRESS POST PEAK HR: 98 BPM
STRESS RATE PRESSURE PRODUCT: NORMAL BPM*MMHG
STRESS ST DEPRESSION: 0 MM
STRESS ST ELEVATION: 0 MM
STRESS TARGET HR: 140 BPM

## 2021-07-28 ENCOUNTER — PATIENT MESSAGE (OUTPATIENT)
Dept: CARDIOLOGY CLINIC | Age: 81
End: 2021-07-28

## 2021-09-16 DIAGNOSIS — E78.00 HYPERCHOLESTEROLEMIA: ICD-10-CM

## 2021-09-16 DIAGNOSIS — I10 ESSENTIAL HYPERTENSION: ICD-10-CM

## 2021-09-17 RX ORDER — AMLODIPINE BESYLATE 5 MG/1
TABLET ORAL
Qty: 90 TABLET | Refills: 2 | Status: SHIPPED | OUTPATIENT
Start: 2021-09-17 | End: 2022-06-08

## 2021-09-17 NOTE — TELEPHONE ENCOUNTER
Refill per VO of Dr. Virgen Mahoney  Last appt: 6/24/2021  Future Appointments   Date Time Provider Eliezer Dalila   1/11/2022 10:40 AM Job MD Fritz [facility] BS AMB       Requested Prescriptions     Pending Prescriptions Disp Refills    amLODIPine (NORVASC) 5 mg tablet [Pharmacy Med Name: AMLODIPINE BESYLATE 5 MG Tablet] 90 Tablet 0     Sig: TAKE 1 TABLET EVERY DAY

## 2021-10-08 DIAGNOSIS — E78.00 HYPERCHOLESTEROLEMIA: ICD-10-CM

## 2021-10-08 DIAGNOSIS — I10 ESSENTIAL HYPERTENSION: ICD-10-CM

## 2021-10-11 RX ORDER — ROSUVASTATIN CALCIUM 10 MG/1
TABLET, COATED ORAL
Qty: 90 TABLET | Refills: 3 | Status: SHIPPED | OUTPATIENT
Start: 2021-10-11 | End: 2022-09-06 | Stop reason: SDUPTHER

## 2021-10-11 NOTE — TELEPHONE ENCOUNTER
Refill per VO of Dr. Ted Bennett  Last appt: 6/24/2021  Future Appointments   Date Time Provider Eliezer Dalila   1/11/2022 10:40 AM Kathleen Pacheco MD [facility] BS AMB       Requested Prescriptions     Pending Prescriptions Disp Refills    rosuvastatin (CRESTOR) 10 mg tablet [Pharmacy Med Name: rosuvastatin 10 mg tablet] 90 Tablet 3     Sig: TAKE ONE TABLET BY MOUTH EVERY NIGHT

## 2021-12-10 DIAGNOSIS — Z96.651 STATUS POST RIGHT KNEE REPLACEMENT: Primary | ICD-10-CM

## 2021-12-10 RX ORDER — CEPHALEXIN 500 MG/1
CAPSULE ORAL
Qty: 4 CAPSULE | Refills: 2 | Status: SHIPPED | OUTPATIENT
Start: 2021-12-10

## 2021-12-28 RX ORDER — NEBIVOLOL 20 MG/1
TABLET ORAL
Qty: 90 TABLET | Refills: 2 | Status: SHIPPED | OUTPATIENT
Start: 2021-12-28 | End: 2022-07-08

## 2021-12-29 NOTE — TELEPHONE ENCOUNTER
Refill per VO of Dr. Ramon Nova  Last appt: 6/24/2021  Future Appointments   Date Time Provider Eliezer Dalila   1/11/2022 10:40 AM Megan Summers MD [facility] BS AMB       Requested Prescriptions     Pending Prescriptions Disp Refills    nebivoloL (Bystolic) 20 mg tablet [Pharmacy Med Name: Bystolic 20 mg tablet] 90 Tablet 2     Sig: TAKE ONE TABLET BY MOUTH EVERY DAY

## 2022-01-21 LAB — HBA1C MFR BLD HPLC: 5.4 %

## 2022-02-17 ENCOUNTER — OFFICE VISIT (OUTPATIENT)
Dept: CARDIOLOGY CLINIC | Age: 82
End: 2022-02-17
Payer: MEDICARE

## 2022-02-17 VITALS
HEART RATE: 63 BPM | WEIGHT: 154 LBS | HEIGHT: 62 IN | OXYGEN SATURATION: 96 % | SYSTOLIC BLOOD PRESSURE: 138 MMHG | BODY MASS INDEX: 28.34 KG/M2 | DIASTOLIC BLOOD PRESSURE: 60 MMHG

## 2022-02-17 DIAGNOSIS — I10 PRIMARY HYPERTENSION: Primary | ICD-10-CM

## 2022-02-17 DIAGNOSIS — E78.00 HYPERCHOLESTEROLEMIA: ICD-10-CM

## 2022-02-17 PROCEDURE — 1101F PT FALLS ASSESS-DOCD LE1/YR: CPT | Performed by: SPECIALIST

## 2022-02-17 PROCEDURE — G8536 NO DOC ELDER MAL SCRN: HCPCS | Performed by: SPECIALIST

## 2022-02-17 PROCEDURE — G0463 HOSPITAL OUTPT CLINIC VISIT: HCPCS | Performed by: SPECIALIST

## 2022-02-17 PROCEDURE — 1090F PRES/ABSN URINE INCON ASSESS: CPT | Performed by: SPECIALIST

## 2022-02-17 PROCEDURE — G8754 DIAS BP LESS 90: HCPCS | Performed by: SPECIALIST

## 2022-02-17 PROCEDURE — G8417 CALC BMI ABV UP PARAM F/U: HCPCS | Performed by: SPECIALIST

## 2022-02-17 PROCEDURE — 99214 OFFICE O/P EST MOD 30 MIN: CPT | Performed by: SPECIALIST

## 2022-02-17 PROCEDURE — G8400 PT W/DXA NO RESULTS DOC: HCPCS | Performed by: SPECIALIST

## 2022-02-17 PROCEDURE — G8752 SYS BP LESS 140: HCPCS | Performed by: SPECIALIST

## 2022-02-17 PROCEDURE — G8510 SCR DEP NEG, NO PLAN REQD: HCPCS | Performed by: SPECIALIST

## 2022-02-17 PROCEDURE — G8427 DOCREV CUR MEDS BY ELIG CLIN: HCPCS | Performed by: SPECIALIST

## 2022-02-17 NOTE — PROGRESS NOTES
Levi Green MD. [facility] [place]              Patient: Coretta Gitelman  :       Today's Date: 2022          HISTORY OF PRESENT ILLNESS:     History of Present Illness:  Has had some chest pain and SOB. And some dizziness if she gets up. CP comes and goes - randomly - brief - mild - not exertional.  She says pain no different than last year. PAST MEDICAL HISTORY:     Past Medical History:   Diagnosis Date    Arthritis     Asthma     albuterol    Carotid artery disease ([address]     mild    [facility])     Acute DVT 18 after Knee surgery 5/15/18     Elevated glucose     borderline elevated A1c    GERD (gastroesophageal reflux disease)     Hypercholesterolemia     Hypertension     Nausea & vomiting     MALINI on CPAP     Shingles        Past Surgical History:   Procedure Laterality Date    ECHO STRESS      normal resting LV wall motion and no evidence of ischemia. The ejection fraction was 50-60%.  HOLTER MONITOR      frequent ventricular extrasystoles. Rare couplets and triplets. NSR during diary entries.      HX CATARACT REMOVAL Left 2019    HX HYSTERECTOMY  1986    HX ORTHOPAEDIC      Right ROTATER CUFF SURGERY     HX ORTHOPAEDIC      R KNEE MENISCUS    HX ORTHOPAEDIC  2013    left tibia repair    HX ORTHOPAEDIC  2012    LEFT ROTATER CUFF REPAIR     HX ORTHOPAEDIC  2009    THUMB TRIGGER FINGER RELEASE    HX OTHER SURGICAL      CARPAL TUNNEL BILATERAL, TRIGGER THUMB RELEASE    HX OTHER SURGICAL      Carotid dopplers - mild disease bilat    HX OTHER SURGICAL      Lexiscan Cardiolite 12 - normal; LVEF 65%    HX OTHER SURGICAL      Echo 12 - TDS, LVEF 60%, no sig valve disease, RVSP 25    HX OTHER SURGICAL      Carotid Duplex 3/14/13 - 10-49% stenosis bilat     HX OTHER SURGICAL      LE ZE's 3/14/13 - normal ZE's          MEDICATIONS:     Current Outpatient Medications   Medication Sig Dispense Refill    nebivoloL (Bystolic) 20 mg tablet TAKE ONE TABLET BY MOUTH EVERY DAY 90 Tablet 2    cephALEXin (KEFLEX) 500 mg capsule Take four capsules one hour prior to dental appointment 4 Capsule 2    rosuvastatin (CRESTOR) 10 mg tablet TAKE ONE TABLET BY MOUTH EVERY NIGHT 90 Tablet 3    amLODIPine (NORVASC) 5 mg tablet TAKE 1 TABLET EVERY DAY 90 Tablet 2    multivitamin (ONE A DAY) tablet Take 1 Tab by mouth daily.  albuterol (PROVENTIL HFA, VENTOLIN HFA, PROAIR HFA) 90 mcg/actuation inhaler Take 2 Puffs by inhalation as needed for Wheezing or Shortness of Breath.  loratadine (CLARITIN) 10 mg tablet Take 10 mg by mouth daily as needed.  cyanocobalamin (VITAMIN B-12) 1,000 mcg/mL injection 1,000 mcg by IntraMUSCular route once.  MONTHLY, APPROX 3/2018         Allergies   Allergen Reactions    Azithromycin Shortness of Breath     Heart slows down  Heart slows down    Tetanus Toxoid, Adsorbed Shortness of Breath    Adhesive Tape-Silicones Rash    Codeine Hives     N/V  N/V    Dilaudid [Hydromorphone (Bulk)] Nausea and Vomiting    Dilaudid [Hydromorphone] Unknown (comments)     Other reaction(s): Unknown (comments)    Ivp Dye [Fd And C Blue No.1] Hives     SWELLING    Metrizamide Hives    Novocain [Procaine] Unknown (comments)     Other reaction(s): Unknown (comments)    Sectral [Acebutolol] Hives and Swelling    Sulfa (Sulfonamide Antibiotics) Nausea and Vomiting     And hives    Sulfasalazine Hives    Talwin [Pentazocine Lactate] Swelling    Tetanus Vaccines And Toxoid Shortness of Breath    Vasotec [Enalapril Maleate] Other (comments)     ELEVATED BP           SOCIAL HISTORY:     Social History     Tobacco Use    Smoking status: Never Smoker    Smokeless tobacco: Never Used   Substance Use Topics    Alcohol use: No     Alcohol/week: 0.0 standard drinks    Drug use: No         FAMILY HISTORY:     Family History   Problem Relation Age of Onset    Cancer Mother         LUNG AND CERVICAL CANCER    Heart Disease Father 46    Coronary Art Dis Father     Heart Attack Father     Dementia Sister     Heart Disease Brother     Cancer Brother         COLON    Cancer Brother         THROAT CANCER    Diabetes Brother     Lung Disease Brother     Sleep Apnea Brother     Anesth Problems Neg Hx            SOCIAL HISTORY:     Social History     Tobacco Use    Smoking status: Never Smoker    Smokeless tobacco: Never Used   Substance Use Topics    Alcohol use: No     Alcohol/week: 0.0 standard drinks    Drug use: No         FAMILY HISTORY:     Family History   Problem Relation Age of Onset    Cancer Mother         LUNG AND CERVICAL CANCER    Heart Disease Father 46    Coronary Art Dis Father     Heart Attack Father     Dementia Sister     Heart Disease Brother     Cancer Brother         COLON    Cancer Brother         THROAT CANCER    Diabetes Brother     Lung Disease Brother     Sleep Apnea Brother     Anesth Problems Neg Hx            REVIEW OF SYMPTOMS:     Review of Symptoms:  Constitutional: Negative for fever, chills .  Stays cold.    HEENT: Negative for vision changes.    Respiratory: no cough    Cardiovascular: Negative for orthopnea, syncope, and PND.    Gastrointestinal: Negative for abdominal pain,or melena  + constipation   Genitourinary: Negative for dysuria    Musculoskeletal: Negative for myalgias.  + knee pain   Skin: Negative for rash    Heme: No problems bleeding.    Neurological: Negative for speech change and focal weakness.    + restless leg syndrome    + Urinary frequency        PHYSICAL EXAM:     Physical Exam:  Visit Vitals  BP (!) 150/70 (BP 1 Location: Left upper arm, BP Patient Position: Supine, BP Cuff Size: Adult)   Pulse (!) 56   Ht 5' 2\" (1.575 m)   Wt 154 lb (69.9 kg)   SpO2 93%   BMI 28.17 kg/m²     Patient appears generally well, mood and affect are appropriate and pleasant.    HEENT: Normocephalic, atraumatic.  sclera anicteric.  Hearing intact.    Neck Exam: Supple, no JVD  Lung Exam: Clear to auscultation, even breath sounds.    Cardiac Exam: Regular rate and rhythm with no murmur or rub   Abdomen: Soft, non-tender, normal bowel sounds.    Extremities: No lower extremity edema.    Psych - appropriate affect    Neuro - non focal     Vitals:    02/17/22 1258 02/17/22 1308 02/17/22 1312   BP: 138/60 (!) 150/70 138/60   BP 1 Location: Right upper arm Left upper arm Left upper arm   BP Patient Position: Sitting Supine Standing   BP Cuff Size: Large adult Adult Adult   Pulse: 67 (!) 56 63   Height: 5' 2\" (1.575 m)     Weight: 154 lb (69.9 kg)     SpO2: 96% 94% 96%           LABS / OTHER STUDIES reviewed:     Lab Results   Component Value Date/Time    Sodium 141 06/30/2021 08:09 AM    Potassium 4.2 06/30/2021 08:09 AM    Chloride 104 06/30/2021 08:09 AM    CO2 23 06/30/2021 08:09 AM    Anion gap 4 (L) 06/06/2018 07:33 PM    Glucose 110 (H) 06/30/2021 08:09 AM    BUN 12 06/30/2021 08:09 AM    Creatinine 0.96 06/30/2021 08:09 AM    BUN/Creatinine ratio 13 06/30/2021 08:09 AM    GFR est AA 65 06/30/2021 08:09 AM    GFR est non-AA 56 (L) 06/30/2021 08:09 AM    Calcium 9.5 06/30/2021 08:09 AM    Bilirubin, total 0.5 06/30/2021 08:09 AM    Alk.  phosphatase 82 06/30/2021 08:09 AM    Protein, total 6.8 06/30/2021 08:09 AM    Albumin 4.1 06/30/2021 08:09 AM    Globulin 3.5 04/09/2013 08:08 PM    A-G Ratio 1.5 06/30/2021 08:09 AM    ALT (SGPT) 9 06/30/2021 08:09 AM    AST (SGOT) 17 06/30/2021 08:09 AM     Lab Results   Component Value Date/Time    WBC 5.6 06/30/2021 08:09 AM    HGB 12.9 06/30/2021 08:09 AM    HCT 38.1 06/30/2021 08:09 AM    PLATELET 207 52/45/6538 08:09 AM    MCV 90 06/30/2021 08:09 AM       Lab Results   Component Value Date/Time    Cholesterol, total 160 06/30/2021 08:09 AM    HDL Cholesterol 39 (L) 06/30/2021 08:09 AM    LDL, calculated 99 06/30/2021 08:09 AM    LDL, calculated 89 07/07/2020 08:07 AM    VLDL, calculated 22 06/30/2021 08:09 AM    VLDL, calculated 18 07/07/2020 08:07 AM    Triglyceride 119 06/30/2021 08:09 AM             CARDIAC DIAGNOSTICS:     Cardiac Evaluation Includes:  I reviewed the results below.      EKG 1/7/16 - sinus bradycardia, normal   EKG 1/19/17 - sinus marylin, otherwise normal   EKG 5/3/18 - sinus marylin, RBBB  EKG 6/4/20 - sinus marylin, RBBB  EKG 6/24/21 - sinus marylin, RBBB         Carotid Duplex 7/14/16 - 0-9% RITA and 49-14% LICA  Carotid Dopplers 5/18/17 - 10-49% stenosis bilat   Exercise Cardiolite 5/18/17 - walked 4:19 (7.0 METS) - normal stress EKG and MPI.  LVEF 67%  Echo 5/18/17 - LVEF 60%, grade 1 diastology.  RV normal.  Mild MR.  RVSP 37      Venous Doppler 6/6/18 - Right lower extremity venous duplex positive for acute deep venous thrombosis involving the distal popliteal, posterior tibial and peroneal veins.      Carotid Doppler 9/20/19 - 10-49% stenosis bilat      Carotid Doppler 12/17/20 - 10-49% bilat ICA stenosis     Lexiscan Cardiolite 7/22/21 - normal MPI, LVEF 70%  Echo 7/22/21 - LVEF 60%, mild LAE, Asc Ao mildly dilated at 3.4 cm            ASSESSMENT AND PLAN:     Assessment and Plan:    1) HTN with mild orthostatic complaints   - SBP mildly high at home with mild orthostatic symptoms   - follow orthostatic vitals   - She is tolerating Norvasc and Bystolic which we'll continue -- discussed fall precautions --> will allow a little higher BP to avoid orthostasis      2) Mild carotid artery disease   - Cont statin   - check carotids periodically       3) History HYLTON and atypical chest pain    - lexiscan cardiolite and echo were normal 5/17   - Still with HYLTON and occasional atypical CP  - Lexiscan Cardiolite 7/22/21 - normal MPI, LVEF 70%  - Echo 7/22/21 - LVEF 60%, mild LAE, Asc Ao mildly dilated at 3.4 cm       4) Dyslipidemia    - we stopped atorvastatin and zeta given muscle complaints - those symptoms are better now    - she is tolerating Crestor   - prior lipids OK       5) See Gabbi in 6 months.  Patient expressed understanding of the plan - questions were answered.       Kids (she had 7) and grandkids are in the area.             Van Guillen MD, [address], [address]  [address] [place] [address].  [address]  Ph: [phone]   Ph [phone]

## 2022-02-17 NOTE — PROGRESS NOTES
Chief Complaint   Patient presents with    Follow-up     8 months    Coronary Artery Disease    Shortness of Breath    Hypertension    Chest Pain (Angina)    Fatigue     Vitals:    02/17/22 1258 02/17/22 1308 02/17/22 1312   BP: 138/60 (!) 150/70 138/60   BP 1 Location: Right upper arm Left upper arm Left upper arm   BP Patient Position: Sitting Supine Standing   BP Cuff Size: Large adult Adult Adult   Pulse: 67 (!) 56 63   Height: 5' 2\" (1.575 m)     Weight: 154 lb (69.9 kg)     SpO2: 96% 94% 96%       Chest pain TIGHTNESS  SOB denied   Palpitations denied   Swelling in hands/feet denied   Dizziness WHEN CHANGE POSITION, LAYING DOWN IN MORNING  Recent hospital stays denied   Refills denied

## 2022-03-20 PROBLEM — M17.11 PRIMARY LOCALIZED OSTEOARTHRITIS OF RIGHT KNEE: Status: ACTIVE | Noted: 2018-05-15

## 2022-06-07 DIAGNOSIS — E78.00 HYPERCHOLESTEROLEMIA: ICD-10-CM

## 2022-06-07 DIAGNOSIS — I10 ESSENTIAL HYPERTENSION: ICD-10-CM

## 2022-06-08 RX ORDER — AMLODIPINE BESYLATE 5 MG/1
TABLET ORAL
Qty: 90 TABLET | Refills: 2 | Status: SHIPPED | OUTPATIENT
Start: 2022-06-08 | End: 2022-08-17 | Stop reason: SDUPTHER

## 2022-06-08 NOTE — TELEPHONE ENCOUNTER
Refill per VO of Dr. Geno Phillips  Last appt: 2/17/2022  Future Appointments   Date Time Provider Eliezer Menezesi   8/17/2022  1:00 PM Faye Chen, PEGGY ESCOBARF BS AMB   2/21/2023  1:00 PM MD LUZ PowerF BS AMB       Requested Prescriptions     Signed Prescriptions Disp Refills    amLODIPine (NORVASC) 5 mg tablet 90 Tablet 2     Sig: TAKE 1 TABLET EVERY DAY     Authorizing Provider: Marlys Longo     Ordering User: Gino Kaur

## 2022-07-08 RX ORDER — NEBIVOLOL 10 MG/1
20 TABLET ORAL DAILY
Qty: 60 TABLET | Refills: 0 | Status: SHIPPED | OUTPATIENT
Start: 2022-07-08 | End: 2022-08-17 | Stop reason: SDUPTHER

## 2022-07-08 NOTE — TELEPHONE ENCOUNTER
Refill per VO of Dr. Vidhya Segovia  Last appt: 2/17/2022  Future Appointments   Date Time Provider Eliezer Conner   8/17/2022  1:00 PM PEGGY Dc AMB   2/21/2023  1:00 PM MD LAURA Catalan BS AMB       Requested Prescriptions     Pending Prescriptions Disp Refills    nebivoloL (BYSTOLIC) 20 mg tablet [Pharmacy Med Name: nebivolol 20 mg tablet] 90 Tablet 2     Sig: TAKE ONE TABLET BY MOUTH EVERY DAY     \"We do not have 20mg in stock and pt needs it today. Can you please send new rx for 10mg as a temporary fill. \"  Called pharmacy and confirmed that they are asking for a temporary supply. Should receive 20 mg tablets Monday.

## 2022-07-26 LAB — HBA1C MFR BLD HPLC: 5.3 %

## 2022-08-17 ENCOUNTER — OFFICE VISIT (OUTPATIENT)
Dept: CARDIOLOGY CLINIC | Age: 82
End: 2022-08-17
Payer: MEDICARE

## 2022-08-17 VITALS — WEIGHT: 154 LBS | HEIGHT: 62 IN | BODY MASS INDEX: 28.34 KG/M2 | OXYGEN SATURATION: 97 %

## 2022-08-17 DIAGNOSIS — R07.9 CHEST PAIN, UNSPECIFIED TYPE: ICD-10-CM

## 2022-08-17 DIAGNOSIS — R42 DIZZINESS: ICD-10-CM

## 2022-08-17 DIAGNOSIS — E78.00 HYPERCHOLESTEROLEMIA: ICD-10-CM

## 2022-08-17 DIAGNOSIS — I10 PRIMARY HYPERTENSION: Primary | ICD-10-CM

## 2022-08-17 DIAGNOSIS — I65.23 BILATERAL CAROTID ARTERY STENOSIS: ICD-10-CM

## 2022-08-17 PROCEDURE — G8427 DOCREV CUR MEDS BY ELIG CLIN: HCPCS | Performed by: NURSE PRACTITIONER

## 2022-08-17 PROCEDURE — G8400 PT W/DXA NO RESULTS DOC: HCPCS | Performed by: NURSE PRACTITIONER

## 2022-08-17 PROCEDURE — G8756 NO BP MEASURE DOC: HCPCS | Performed by: NURSE PRACTITIONER

## 2022-08-17 PROCEDURE — G0463 HOSPITAL OUTPT CLINIC VISIT: HCPCS | Performed by: NURSE PRACTITIONER

## 2022-08-17 PROCEDURE — G8417 CALC BMI ABV UP PARAM F/U: HCPCS | Performed by: NURSE PRACTITIONER

## 2022-08-17 PROCEDURE — 99214 OFFICE O/P EST MOD 30 MIN: CPT | Performed by: NURSE PRACTITIONER

## 2022-08-17 PROCEDURE — 1090F PRES/ABSN URINE INCON ASSESS: CPT | Performed by: NURSE PRACTITIONER

## 2022-08-17 PROCEDURE — 93005 ELECTROCARDIOGRAM TRACING: CPT | Performed by: NURSE PRACTITIONER

## 2022-08-17 PROCEDURE — G8536 NO DOC ELDER MAL SCRN: HCPCS | Performed by: NURSE PRACTITIONER

## 2022-08-17 PROCEDURE — 1123F ACP DISCUSS/DSCN MKR DOCD: CPT | Performed by: NURSE PRACTITIONER

## 2022-08-17 PROCEDURE — 93010 ELECTROCARDIOGRAM REPORT: CPT | Performed by: NURSE PRACTITIONER

## 2022-08-17 PROCEDURE — G8432 DEP SCR NOT DOC, RNG: HCPCS | Performed by: NURSE PRACTITIONER

## 2022-08-17 PROCEDURE — 1101F PT FALLS ASSESS-DOCD LE1/YR: CPT | Performed by: NURSE PRACTITIONER

## 2022-08-17 RX ORDER — AMLODIPINE BESYLATE 5 MG/1
5 TABLET ORAL DAILY
Qty: 90 TABLET | Refills: 2
Start: 2022-08-17

## 2022-08-17 RX ORDER — TRAZODONE HYDROCHLORIDE 50 MG/1
50 TABLET ORAL
COMMUNITY
Start: 2022-05-19

## 2022-08-17 RX ORDER — NEBIVOLOL 20 MG/1
20 TABLET ORAL DAILY
Qty: 90 TABLET | Refills: 0 | Status: SHIPPED | OUTPATIENT
Start: 2022-08-17

## 2022-08-17 NOTE — PATIENT INSTRUCTIONS
I refilled your bystolic for 22YV/Y - brand name only. See if this helps with dizziness. You can also try compression hose and increased hydration to help. Please keep track of home BP - check twice daily x 2 weeks  Also check heart rate or pulse. Keep a log of data. Call if HR routinely <50 or you feel worsening fatigue / dizziness. Follow back up in 2-3 weeks.

## 2022-08-17 NOTE — PROGRESS NOTES
Atif Mckay, JAMIA  [address]  [place], [address] [facility]    Office [phone]  Fax [phone]            Patient: Niurka Sanon  :       Today's Date: 2022          HISTORY OF PRESENT ILLNESS:     History of Present Illness:  Here for fu.    C/o dizziness if she gets up to quickly, timothy in AM. Also dizzy when turning over in bed. States this has been worse recently. Has generic form for bystolic which pt feels maybe the cause - also question if pt has been taking 10mg/d vs 20mg/d. BP has been high recently 044U systolic at home. Also notes some exertional CP when really overdoing it. States it quickly resolves with rest.  Unsure how long this has been going on - very infrequent. On review of note from Endo (Dr. Farncisco Arias) seen 22 - pt thought dizziness was 2/2 to norvasc - had stopped with improvement in symptoms. Today pt states she believes she restarted med - doesn't feel like stopping helped (?). PAST MEDICAL HISTORY:     Past Medical History:   Diagnosis Date    Arthritis     Asthma     albuterol    Carotid artery disease ([address]     mild    DVT (deep venous thrombosis) ([address]     Acute DVT 18 after Knee surgery 5/15/18     Elevated glucose     borderline elevated A1c    GERD (gastroesophageal reflux disease)     Hypercholesterolemia     Hypertension     Nausea & vomiting     MALINI on CPAP     Shingles        Past Surgical History:   Procedure Laterality Date    ECHO STRESS      normal resting LV wall motion and no evidence of ischemia. The ejection fraction was 50-60%. HOLTER MONITOR  2007    frequent ventricular extrasystoles. Rare couplets and triplets. NSR during diary entries.      HX CATARACT REMOVAL Left 2019    HX HYSTERECTOMY  1986    HX ORTHOPAEDIC      Right ROTATER CUFF SURGERY     HX ORTHOPAEDIC      R KNEE MENISCUS    HX ORTHOPAEDIC  2013    left tibia repair    HX ORTHOPAEDIC  2012 LEFT ROTATER CUFF REPAIR     HX ORTHOPAEDIC  03/2009    THUMB TRIGGER FINGER RELEASE    HX OTHER SURGICAL      CARPAL TUNNEL BILATERAL, TRIGGER THUMB RELEASE    HX OTHER SURGICAL      Carotid dopplers 11/11- mild disease bilat    HX OTHER SURGICAL      Lexiscan Cardiolite 8/23/12 - normal; LVEF 65%    HX OTHER SURGICAL      Echo 8/23/12 - TDS, LVEF 60%, no sig valve disease, RVSP 25    HX OTHER SURGICAL      Carotid Duplex 3/14/13 - 10-49% stenosis bilat     HX OTHER SURGICAL      LE ZE's 3/14/13 - normal ZE's           MEDICATIONS:            Allergies   Allergen Reactions    Azithromycin Shortness of Breath       Heart slows down  Heart slows down    Tetanus Toxoid, Adsorbed Shortness of Breath    Adhesive Tape-Silicones Rash    Codeine Hives       N/V  N/V    Dilaudid [Hydromorphone (Bulk)] Nausea and Vomiting    Dilaudid [Hydromorphone] Unknown (comments)       Other reaction(s): Unknown (comments)    Ivp Dye [Fd And C Blue No.1] Hives       SWELLING    Metrizamide Hives    Novocain [Procaine] Unknown (comments)       Other reaction(s): Unknown (comments)    Sectral [Acebutolol] Hives and Swelling    Sulfa (Sulfonamide Antibiotics) Nausea and Vomiting       And hives    Sulfasalazine Hives    Talwin [Pentazocine Lactate] Swelling    Tetanus Vaccines And Toxoid Shortness of Breath    Vasotec [Enalapril Maleate] Other (comments)       ELEVATED BP            SOCIAL HISTORY:      Social History            Tobacco Use    Smoking status: Never Smoker    Smokeless tobacco: Never Used   Substance Use Topics    Alcohol use: No       Alcohol/week: 0.0 standard drinks    Drug use: No            FAMILY HISTORY:            Family History   Problem Relation Age of Onset    Cancer Mother           LUNG AND CERVICAL CANCER    Heart Disease Father 46    Coronary Art Dis Father      Heart Attack Father      Dementia Sister      Heart Disease Brother      Cancer Brother           COLON    Cancer Brother           THROAT CANCER Diabetes Brother      Lung Disease Brother      Sleep Apnea Brother      Anesth Problems Neg Hx              SOCIAL HISTORY:      Social History            Tobacco Use    Smoking status: Never Smoker    Smokeless tobacco: Never Used   Substance Use Topics    Alcohol use: No       Alcohol/week: 0.0 standard drinks    Drug use: No            FAMILY HISTORY:            Family History   Problem Relation Age of Onset    Cancer Mother           LUNG AND CERVICAL CANCER    Heart Disease Father 46    Coronary Art Dis Father      Heart Attack Father      Dementia Sister      Heart Disease Brother      Cancer Brother           COLON    Cancer Brother           THROAT CANCER    Diabetes Brother      Lung Disease Brother      Sleep Apnea Brother      Anesth Problems Neg Hx                 REVIEW OF SYMPTOMS:      Review of Symptoms:  Constitutional: Negative for fever, chills . Stays cold. HEENT: Negative for vision changes. Respiratory: no cough    Cardiovascular: Negative for orthopnea, syncope, and PND. Gastrointestinal: Negative for abdominal pain,or melena  + constipation   Genitourinary: Negative for dysuria    Musculoskeletal: Negative for myalgias. + knee pain   Skin: Negative for rash    Heme: No problems bleeding. Neurological: Negative for speech change and focal weakness. + restless leg syndrome    + Urinary frequency       PHYSICAL EXAM:      Physical Exam:    Visit Vitals  Ht 5' 2\" (1.575 m)   Wt 154 lb (69.9 kg)   SpO2 97%   BMI 28.17 kg/m²     Vitals:    08/17/22 1400 08/17/22 1405 08/17/22 1410 08/17/22 1411   BP 2: 178/88 178/84 176/86 178/84   BP 1 Location: Left upper arm Left upper arm Left upper arm Left upper arm   BP Patient Position: Supine Standing Standing Sitting   BP Cuff Size:  Adult Adult Adult   Pulse 2: 56 58 58    Height:       Weight:       SpO2:         Patient appears generally well, mood and affect are appropriate and pleasant. HEENT: Normocephalic, atraumatic. sclera anicteric. Hearing intact. Neck Exam: Supple, no JVD  Lung Exam: Clear to auscultation, even breath sounds. Cardiac Exam: Regular rate and rhythm with no murmur or rub   Abdomen: Soft, non-tender, normal bowel sounds. Extremities: No lower extremity edema. Psych - appropriate affect    Neuro - non focal       LABS / OTHER STUDIES reviewed:      Lab Results   Component Value Date/Time    Sodium 141 06/30/2021 08:09 AM    Potassium 4.2 06/30/2021 08:09 AM    Chloride 104 06/30/2021 08:09 AM    CO2 23 06/30/2021 08:09 AM    Anion gap 4 (L) 06/06/2018 07:33 PM    Glucose 110 (H) 06/30/2021 08:09 AM    BUN 12 06/30/2021 08:09 AM    Creatinine 0.96 06/30/2021 08:09 AM    BUN/Creatinine ratio 13 06/30/2021 08:09 AM    GFR est AA 65 06/30/2021 08:09 AM    GFR est non-AA 56 (L) 06/30/2021 08:09 AM    Calcium 9.5 06/30/2021 08:09 AM    Bilirubin, total 0.5 06/30/2021 08:09 AM    Alk.  phosphatase 82 06/30/2021 08:09 AM    Protein, total 6.8 06/30/2021 08:09 AM    Albumin 4.1 06/30/2021 08:09 AM    Globulin 3.5 04/09/2013 08:08 PM    A-G Ratio 1.5 06/30/2021 08:09 AM    ALT (SGPT) 9 06/30/2021 08:09 AM    AST (SGOT) 17 06/30/2021 08:09 AM     Lab Results   Component Value Date/Time    WBC 5.6 06/30/2021 08:09 AM    HGB 12.9 06/30/2021 08:09 AM    HCT 38.1 06/30/2021 08:09 AM    PLATELET 539 86/67/8539 08:09 AM    MCV 90 06/30/2021 08:09 AM     Lab Results   Component Value Date/Time    Cholesterol, total 160 06/30/2021 08:09 AM    HDL Cholesterol 39 (L) 06/30/2021 08:09 AM    LDL, calculated 99 06/30/2021 08:09 AM    LDL, calculated 89 07/07/2020 08:07 AM    VLDL, calculated 22 06/30/2021 08:09 AM    VLDL, calculated 18 07/07/2020 08:07 AM    Triglyceride 119 06/30/2021 08:09 AM     Lab Results   Component Value Date/Time    TSH 1.560 06/30/2021 08:09 AM     Lab Results   Component Value Date/Time    Hemoglobin A1c 5.1 05/02/2018 03:44 PM     No results found for: CPK, RCK1, RCK2, RCK3, RCK4, CKMB, CKNDX, CKND1, TROPT, TROIQ, BNPP, BNP       CARDIAC DIAGNOSTICS:      Cardiac Evaluation Includes:  I reviewed the results below. EKG 1/7/16 - sinus bradycardia, normal   EKG 1/19/17 - sinus marylin, otherwise normal   EKG 5/3/18 - sinus marylin, RBBB  EKG 6/4/20 - sinus marylin, RBBB  EKG 6/24/21 - sinus marylin, RBBB         Carotid Duplex 7/14/16 - 0-9% RITA and 96-36% LICA  Carotid Dopplers 5/18/17 - 10-49% stenosis bilat   Exercise Cardiolite 5/18/17 - walked 4:19 (7.0 METS) - normal stress EKG and MPI. LVEF 67%  Echo 5/18/17 - LVEF 60%, grade 1 diastology. RV normal.  Mild MR.  RVSP 37      Venous Doppler 6/6/18 - Right lower extremity venous duplex positive for acute deep venous thrombosis involving the distal popliteal, posterior tibial and peroneal veins.       Carotid Doppler 9/20/19 - 10-49% stenosis bilat      Carotid Doppler 12/17/20 - 10-49% bilat ICA stenosis      Lexiscan Cardiolite 7/22/21 - normal MPI, LVEF 70%  Echo 7/22/21 - LVEF 60%, mild LAE, Asc Ao mildly dilated at 3.4 cm         ASSESSMENT AND PLAN:      Assessment and Plan:     1) HTN with orthostatic complaints   - orthostatics negative in office today and BP elevated - meds a little unclear  - advised to take norvasc 5mg/d and bystolic 08WJ/D for now  - keep home BP / HR log (call if HR <50 routinely)  -  lose fu to review  - discussed increased hydration and compression hose      2) Mild carotid artery disease   - Cont statin   - check carotids periodically        3) History HYLTON and chest pain    - lexiscan cardiolite and echo were normal 5/17   - Still with HYLTON and occasional atypical CP  - Lexiscan Cardiolite 7/22/21 - normal MPI, LVEF 70%  - Echo 7/22/21 - LVEF 60%, mild LAE, Asc Ao mildly dilated at 3.4 cm   - recent exertional symptoms - re-eval next vist with improved BP control       4) Dyslipidemia    - we stopped atorvastatin and zeta given muscle complaints - those symptoms are better now    - she is tolerating Crestor   - prior lipids OK       5) fu in 2-3 wks or PRN      Patient expressed understanding of the plan - questions were answered. Kids (she had 7) and grandkids are in the area.             Mercedez Gonzalez, ANP

## 2022-08-17 NOTE — LETTER
8/17/2022    Patient: Nohemy Shaver   YOB: 1940   Date of Visit: 8/17/2022     Arabella Patel MD  [address] [place] [address]  Via Fax: [phone]     Killian Ferguson [phone] [facility]  Via In [facility] [address]    Dear MD Chikis Noble MD,      Thank you for referring Ms. Billy Quarles to [facility] for evaluation. My notes for this consultation are attached. If you have questions, please do not hesitate to call me. I look forward to following your patient along with you.       Sincerely,    Traci Boas, NP

## 2022-08-17 NOTE — PROGRESS NOTES
Room: EP2    Charo Pate 2-21-23    -Dizzy     -Bystolic, discuss     Chest pain: no  Shortness of breath: no  Dizziness: yes  Palpitations/Racing Heart: no  Swelling: no    New diagnosis/Surgeries since your last visit: no    Hospitalizations since your last visit: no    Refills: no

## 2022-09-06 ENCOUNTER — OFFICE VISIT (OUTPATIENT)
Dept: CARDIOLOGY CLINIC | Age: 82
End: 2022-09-06
Payer: MEDICARE

## 2022-09-06 VITALS
DIASTOLIC BLOOD PRESSURE: 72 MMHG | WEIGHT: 156 LBS | SYSTOLIC BLOOD PRESSURE: 126 MMHG | OXYGEN SATURATION: 98 % | HEART RATE: 56 BPM | BODY MASS INDEX: 28.71 KG/M2 | HEIGHT: 62 IN

## 2022-09-06 DIAGNOSIS — E78.00 HYPERCHOLESTEROLEMIA: ICD-10-CM

## 2022-09-06 DIAGNOSIS — I10 ESSENTIAL HYPERTENSION: ICD-10-CM

## 2022-09-06 DIAGNOSIS — E78.5 DYSLIPIDEMIA: ICD-10-CM

## 2022-09-06 DIAGNOSIS — I10 PRIMARY HYPERTENSION: Primary | ICD-10-CM

## 2022-09-06 DIAGNOSIS — I65.23 BILATERAL CAROTID ARTERY STENOSIS: ICD-10-CM

## 2022-09-06 PROCEDURE — 1090F PRES/ABSN URINE INCON ASSESS: CPT | Performed by: NURSE PRACTITIONER

## 2022-09-06 PROCEDURE — G8428 CUR MEDS NOT DOCUMENT: HCPCS | Performed by: NURSE PRACTITIONER

## 2022-09-06 PROCEDURE — G8752 SYS BP LESS 140: HCPCS | Performed by: NURSE PRACTITIONER

## 2022-09-06 PROCEDURE — G8400 PT W/DXA NO RESULTS DOC: HCPCS | Performed by: NURSE PRACTITIONER

## 2022-09-06 PROCEDURE — 99214 OFFICE O/P EST MOD 30 MIN: CPT | Performed by: NURSE PRACTITIONER

## 2022-09-06 PROCEDURE — 1101F PT FALLS ASSESS-DOCD LE1/YR: CPT | Performed by: NURSE PRACTITIONER

## 2022-09-06 PROCEDURE — G0463 HOSPITAL OUTPT CLINIC VISIT: HCPCS | Performed by: NURSE PRACTITIONER

## 2022-09-06 PROCEDURE — G8754 DIAS BP LESS 90: HCPCS | Performed by: NURSE PRACTITIONER

## 2022-09-06 PROCEDURE — G8417 CALC BMI ABV UP PARAM F/U: HCPCS | Performed by: NURSE PRACTITIONER

## 2022-09-06 PROCEDURE — G8432 DEP SCR NOT DOC, RNG: HCPCS | Performed by: NURSE PRACTITIONER

## 2022-09-06 PROCEDURE — 1123F ACP DISCUSS/DSCN MKR DOCD: CPT | Performed by: NURSE PRACTITIONER

## 2022-09-06 PROCEDURE — G8536 NO DOC ELDER MAL SCRN: HCPCS | Performed by: NURSE PRACTITIONER

## 2022-09-06 RX ORDER — CETIRIZINE HCL 10 MG
10 TABLET ORAL
COMMUNITY

## 2022-09-06 RX ORDER — ROSUVASTATIN CALCIUM 10 MG/1
10 TABLET, COATED ORAL
Qty: 90 TABLET | Refills: 1 | Status: SHIPPED | OUTPATIENT
Start: 2022-09-06

## 2022-09-06 NOTE — PROGRESS NOTES
Sam Hankins, [facility]  [address], [address]  [place], [address] [facility]    Office [phone]  Fax [phone]            Patient: [place]  :       Today's Date: 2022          HISTORY OF PRESENT ILLNESS:     History of Present Illness:  Here for fu of BP / dizziness. Question if she was taking meds correctly last visit  - reviewed at length. Feeling much better now - home pressures most recently 120s-130s/60s-70s (infreq 140s-150s). HR 50s-60s. Patient denies any exertional chest pain,  palpitations, syncope, or paroxysmal nocturnal dyspnea. Has ongoing dyspnea noted outside only if doing too much - feels well when indoors and without issue. Has mild ankle swelling at times (none today in office). PAST MEDICAL HISTORY:     Past Medical History:   Diagnosis Date    Arthritis     Asthma     albuterol    Carotid artery disease ([address]     mild    DVT (deep venous thrombosis) ([address]     Acute DVT 18 after Knee surgery 5/15/18     Elevated glucose     borderline elevated A1c    GERD (gastroesophageal reflux disease)     Hypercholesterolemia     Hypertension     Nausea & vomiting     MALINI on CPAP     Shingles        Past Surgical History:   Procedure Laterality Date    ECHO STRESS      normal resting LV wall motion and no evidence of ischemia. The ejection fraction was 50-60%. HOLTER MONITOR      frequent ventricular extrasystoles. Rare couplets and triplets. NSR during diary entries.      HX CATARACT REMOVAL Left 2019    HX HYSTERECTOMY  1986    HX ORTHOPAEDIC      Right ROTATER CUFF SURGERY     HX ORTHOPAEDIC      R KNEE MENISCUS    HX ORTHOPAEDIC  2013    left tibia repair    HX ORTHOPAEDIC  2012    LEFT ROTATER CUFF REPAIR     HX ORTHOPAEDIC  2009    THUMB TRIGGER FINGER RELEASE    HX OTHER SURGICAL      CARPAL TUNNEL BILATERAL, TRIGGER THUMB RELEASE    HX OTHER SURGICAL      Carotid dopplers - mild disease bilat HX OTHER SURGICAL      Lexiscan Cardiolite 8/23/12 - normal; LVEF 65%    HX OTHER SURGICAL      Echo 8/23/12 - TDS, LVEF 60%, no sig valve disease, RVSP 25    HX OTHER SURGICAL      Carotid Duplex 3/14/13 - 10-49% stenosis bilat     HX OTHER SURGICAL      LE ZE's 3/14/13 - normal ZE's           MEDICATIONS:            Allergies   Allergen Reactions    Azithromycin Shortness of Breath       Heart slows down  Heart slows down    Tetanus Toxoid, Adsorbed Shortness of Breath    Adhesive Tape-Silicones Rash    Codeine Hives       N/V  N/V    Dilaudid [Hydromorphone (Bulk)] Nausea and Vomiting    Dilaudid [Hydromorphone] Unknown (comments)       Other reaction(s): Unknown (comments)    Ivp Dye [Fd And C Blue No.1] Hives       SWELLING    Metrizamide Hives    Novocain [Procaine] Unknown (comments)       Other reaction(s): Unknown (comments)    Sectral [Acebutolol] Hives and Swelling    Sulfa (Sulfonamide Antibiotics) Nausea and Vomiting       And hives    Sulfasalazine Hives    Talwin [Pentazocine Lactate] Swelling    Tetanus Vaccines And Toxoid Shortness of Breath    Vasotec [Enalapril Maleate] Other (comments)       ELEVATED BP            SOCIAL HISTORY:      Social History            Tobacco Use    Smoking status: Never Smoker    Smokeless tobacco: Never Used   Substance Use Topics    Alcohol use: No       Alcohol/week: 0.0 standard drinks    Drug use: No            FAMILY HISTORY:            Family History   Problem Relation Age of Onset    Cancer Mother           LUNG AND CERVICAL CANCER    Heart Disease Father 46    Coronary Art Dis Father      Heart Attack Father      Dementia Sister      Heart Disease Brother      Cancer Brother           COLON    Cancer Brother           THROAT CANCER    Diabetes Brother      Lung Disease Brother      Sleep Apnea Brother      Anesth Problems Neg Hx              SOCIAL HISTORY:      Social History            Tobacco Use    Smoking status: Never Smoker    Smokeless tobacco: Never Used   Substance Use Topics    Alcohol use: No       Alcohol/week: 0.0 standard drinks    Drug use: No            FAMILY HISTORY:            Family History   Problem Relation Age of Onset    Cancer Mother           LUNG AND CERVICAL CANCER    Heart Disease Father 46    Coronary Art Dis Father      Heart Attack Father      Dementia Sister      Heart Disease Brother      Cancer Brother           COLON    Cancer Brother           THROAT CANCER    Diabetes Brother      Lung Disease Brother      Sleep Apnea Brother      Anesth Problems Neg Hx                 REVIEW OF SYMPTOMS:      Review of Symptoms:  Constitutional: Negative for fever, chills . Stays cold. HEENT: Negative for vision changes. Respiratory: no cough    Cardiovascular: Negative for orthopnea, syncope, and PND. Gastrointestinal: Negative for abdominal pain,or melena  + constipation   Genitourinary: Negative for dysuria    Musculoskeletal: Negative for myalgias. + knee pain   Skin: Negative for rash    Heme: No problems bleeding. Neurological: Negative for speech change and focal weakness. + restless leg syndrome    + Urinary frequency       PHYSICAL EXAM:      Physical Exam:    Visit Vitals  /72 (BP 1 Location: Left upper arm, BP Patient Position: Sitting, BP Cuff Size: Adult)   Pulse (!) 56   Ht 5' 2\" (1.575 m)   Wt 156 lb (70.8 kg)   SpO2 98%   BMI 28.53 kg/m²     General - well developed well nourished  Neck - neck supple, no JVD  Cardiac - normal S1, S2, RRR, no murmurs, rubs or gallops.  No clicks  Vascular - radials pulses equal bilateral  Lungs - clear to auscultation bilaterals, no rales, wheezing or rhonchi  Abd - soft nontender, non-distended, +BS  Extremities - no edema, warm  Neuro - nonfocal  Psych - normal mood and affect        LABS / OTHER STUDIES reviewed:      Lab Results   Component Value Date/Time    Sodium 141 06/30/2021 08:09 AM    Potassium 4.2 06/30/2021 08:09 AM    Chloride 104 06/30/2021 08:09 AM    CO2 23 06/30/2021 08:09 AM    Anion gap 4 (L) 06/06/2018 07:33 PM    Glucose 110 (H) 06/30/2021 08:09 AM    BUN 12 06/30/2021 08:09 AM    Creatinine 0.96 06/30/2021 08:09 AM    BUN/Creatinine ratio 13 06/30/2021 08:09 AM    GFR est AA 65 06/30/2021 08:09 AM    GFR est non-AA 56 (L) 06/30/2021 08:09 AM    Calcium 9.5 06/30/2021 08:09 AM    Bilirubin, total 0.5 06/30/2021 08:09 AM    Alk. phosphatase 82 06/30/2021 08:09 AM    Protein, total 6.8 06/30/2021 08:09 AM    Albumin 4.1 06/30/2021 08:09 AM    Globulin 3.5 04/09/2013 08:08 PM    A-G Ratio 1.5 06/30/2021 08:09 AM    ALT (SGPT) 9 06/30/2021 08:09 AM    AST (SGOT) 17 06/30/2021 08:09 AM     Lab Results   Component Value Date/Time    WBC 5.6 06/30/2021 08:09 AM    HGB 12.9 06/30/2021 08:09 AM    HCT 38.1 06/30/2021 08:09 AM    PLATELET 189 46/97/3146 08:09 AM    MCV 90 06/30/2021 08:09 AM     Lab Results   Component Value Date/Time    Cholesterol, total 160 06/30/2021 08:09 AM    HDL Cholesterol 39 (L) 06/30/2021 08:09 AM    LDL, calculated 99 06/30/2021 08:09 AM    LDL, calculated 89 07/07/2020 08:07 AM    VLDL, calculated 22 06/30/2021 08:09 AM    VLDL, calculated 18 07/07/2020 08:07 AM    Triglyceride 119 06/30/2021 08:09 AM     Lab Results   Component Value Date/Time    TSH 1.560 06/30/2021 08:09 AM     Lab Results   Component Value Date/Time    Hemoglobin A1c 5.1 05/02/2018 03:44 PM     No results found for: CPK, RCK1, RCK2, RCK3, RCK4, CKMB, CKNDX, CKND1, TROPT, TROIQ, BNPP, BNP       CARDIAC DIAGNOSTICS:      Cardiac Evaluation Includes:  I reviewed the results below. EKG 1/7/16 - sinus bradycardia, normal   EKG 1/19/17 - sinus marylin, otherwise normal   EKG 5/3/18 - sinus marylin, RBBB  EKG 6/4/20 - sinus marylin, RBBB  EKG 6/24/21 - sinus marylin, RBBB         Carotid Duplex 7/14/16 - 0-9% RITA and 29-19% LICA  Carotid Dopplers 5/18/17 - 10-49% stenosis bilat   Exercise Cardiolite 5/18/17 - walked 4:19 (7.0 METS) - normal stress EKG and MPI.   LVEF 67%  Echo 5/18/17 - LVEF 60%, grade 1 diastology. RV normal.  Mild MR.  RVSP 37      Venous Doppler 6/6/18 - Right lower extremity venous duplex positive for acute deep venous thrombosis involving the distal popliteal, posterior tibial and peroneal veins. Carotid Doppler 9/20/19 - 10-49% stenosis bilat      Carotid Doppler 12/17/20 - 10-49% bilat ICA stenosis      Lexiscan Cardiolite 7/22/21 - normal MPI, LVEF 70%    Echo 7/22/21 - LVEF 60%, mild LAE, Asc Ao mildly dilated at 3.4 cm         ASSESSMENT AND PLAN:      Assessment and Plan:     1) HTN with hx of orthostatic complaints   - doing well on norvasc 5mg/d and bystolic 06CU/S   - no c/o today  - target BP <140/90     2) Mild carotid artery disease   - Cont statin   - check carotids periodically        3) History HYLTON and chest pain    - lexiscan cardiolite and echo were normal 5/17   - Still with HYLTON and occasional atypical CP  - Lexiscan Cardiolite 7/22/21 - normal MPI, LVEF 70%  - Echo 7/22/21 - LVEF 60%, mild LAE, Asc Ao mildly dilated at 3.4 cm   - recent exertional symptoms improved with BP control - mild dyspnea when outdoors only       4) Dyslipidemia    - we stopped atorvastatin and zeta given muscle complaints - those symptoms are better now    - she is tolerating Crestor   - prior lipids OK       5) fu in 6mo or PRN     Patient expressed understanding of the plan - questions were answered. Kids (she had 7) and grandkids are in the area.             Alejandrina Aguilar, ANP

## 2022-09-06 NOTE — LETTER
9/6/2022    Patient: Za Dunn   YOB: 1940   Date of Visit: 9/6/2022     Audelia Milligan MD  [address] [place] [address]  Via Fax: [phone]     Killian Kim [facility]  [address] [facility]  Via In [place]    Dear MD Jamal Damon MD,      Thank you for referring Ms. Lindsay Mayes to [facility] for evaluation. My notes for this consultation are attached. If you have questions, please do not hesitate to call me. I look forward to following your patient along with you.       Sincerely,    Leoncio Felipe NP

## 2022-09-06 NOTE — PROGRESS NOTES
Room: EP2    I refilled your bystolic for 42BR/G - brand name only. See if this helps with dizziness. You can also try compression hose and increased hydration to help. Please keep track of home BP - check twice daily x 2 weeks  Also check heart rate or pulse. Keep a log of data. Call if HR routinely <50 or you feel worsening fatigue / dizziness. Follow back up in 2-3 weeks.       Visit Vitals  /72 (BP 1 Location: Left upper arm, BP Patient Position: Sitting, BP Cuff Size: Adult)   Pulse (!) 56   Ht 5' 2\" (1.575 m)   Wt 156 lb (70.8 kg)   SpO2 98%   BMI 28.53 kg/m²         Chest pain: no  Shortness of breath: no  Dizziness: gotten better   Palpitations/Racing Heart: no  Swelling: no    New diagnosis/Surgeries since your last visit: no    Hospitalizations since your last visit: no    Refills: rosuvastatin 90 days

## 2022-12-07 ENCOUNTER — OFFICE VISIT (OUTPATIENT)
Dept: ORTHOPEDIC SURGERY | Age: 82
End: 2022-12-07
Payer: MEDICARE

## 2022-12-07 VITALS — HEIGHT: 62 IN | WEIGHT: 155 LBS | BODY MASS INDEX: 28.52 KG/M2

## 2022-12-07 DIAGNOSIS — M17.12 PRIMARY OSTEOARTHRITIS OF LEFT KNEE: Primary | ICD-10-CM

## 2022-12-07 NOTE — LETTER
12/7/2022    Patient: Lucius Stapleton   YOB: 1940   Date of Visit: 12/7/2022     Kellie Broussard MD  [address]  Via Fax: [phone]    Dear Kellie Broussard MD,      Thank you for referring Ms. Caryn Spears to [facility] for evaluation. My notes for this consultation are attached. If you have questions, please do not hesitate to call me. I look forward to following your patient along with you.       Sincerely,    Anaya Tran MD

## 2022-12-07 NOTE — PROGRESS NOTES
Sushma Duron (: 1940) is a 80 y.o. female, patient, here for evaluation of the following chief complaint(s):  Knee Pain (Left )       SUBJECTIVE/OBJECTIVE:  Sushma Duron presents today complaining of left knee pain. I replaced her right knee in 2018, and treated her left tibial plateau fracture in . She is here for left knee pain today. She has had intermittent corticosteroid injections in the left knee in the past.  Is been quite sometime since last injection. She was having some anteromedial and anterolateral pain with activity, progressing, until she made the appointment and since then symptoms have significantly improved. She relates intermittent giving way and mechanical type stabbing symptoms in the knee. Generally does not have wakening night pain. PHYSICAL EXAM:  Vitals: Ht 5' 2\" (1.575 m)   Wt 155 lb (70.3 kg)   BMI 28.35 kg/m²   Body mass index is 28.35 kg/m². 80y.o. year old F in no acute distress. Ambulates without a limp. Pain-free rotation left hip. Negative Stinchfield. Left knee neutral alignment. No warmth swelling or effusion. Healed anterior lateral scar from previous tibial plateau fracture fixation. No focal tenderness. Full active knee extension with flexion past 120 degrees. Patella tracks centrally. No instability. IMAGING:  Radiographs: XR Results (most recent):  Results from Appointment encounter on 22    XR KNEE LT MIN 4 V    Narrative  4 x-ray views of left knee including AP and PA flexion, lateral, sunrise demonstrate hardware from previous tibial plateau fracture fixation. No hardware complications. Moderate loss of lateral tibiofemoral joint space. Severe loss of medial patellofemoral joint space. Small marginal osteophytes present. Osteopenia. ASSESSMENT/PLAN:  1. Left knee pain, unspecified chronicity  -     XR KNEE LT MIN 4 V; Future    The xray and exam findings were discussed with the patient today.   Moderate to severe osteoarthritis of left knee, status posttibial plateau fracture fixation. Discussed continued conservative treatment measures. She would like to stick with intermittent over-the-counter Advil as needed. She only takes a few doses a week. May augment with Tylenol. She would like to restart home exercise program for quad strengthening and lieu of returning to physical therapy. She will return as needed for left knee, also for routine long-term follow-up of right total knee. No follow-ups on file. Review Of Systems  ROS    Positive for: Musculoskeletal  Last edited by Bronwen Boeck on 12/7/2022  8:45 AM.         Patient denies any recent fever, chills, nausea, vomiting, chest pain, or shortness of breath. Allergies   Allergen Reactions    Azithromycin Shortness of Breath     Heart slows down  Heart slows down    Tetanus Toxoid, Adsorbed Shortness of Breath    Adhesive Tape-Silicones Rash    Codeine Hives     N/V  N/V    Dilaudid [Hydromorphone (Bulk)] Nausea and Vomiting    Dilaudid [Hydromorphone] Unknown (comments)     Other reaction(s): Unknown (comments)    Ivp Dye [Fd And C Blue No.1] Hives     SWELLING    Metrizamide Hives    Novocain [Procaine] Unknown (comments)     Other reaction(s): Unknown (comments)    Sectral [Acebutolol] Hives and Swelling    Sulfa (Sulfonamide Antibiotics) Nausea and Vomiting     And hives    Sulfasalazine Hives    Talwin [Pentazocine Lactate] Swelling    Tetanus Vaccines And Toxoid Shortness of Breath    Vasotec [Enalapril Maleate] Other (comments)     ELEVATED BP       Current Outpatient Medications   Medication Sig    cetirizine (ZYRTEC) 10 mg tablet Take 10 mg by mouth daily as needed for Allergies. rosuvastatin (CRESTOR) 10 mg tablet Take 1 Tablet by mouth nightly. traZODone (DESYREL) 50 mg tablet Take 50 mg by mouth nightly. amLODIPine (NORVASC) 5 mg tablet Take 1 Tablet by mouth daily.     nebivoloL (BYSTOLIC) 20 mg tablet Take 1 Tablet by mouth daily. multivitamin (ONE A DAY) tablet Take 1 Tab by mouth daily. albuterol (PROVENTIL HFA, VENTOLIN HFA, PROAIR HFA) 90 mcg/actuation inhaler Take 2 Puffs by inhalation as needed for Wheezing or Shortness of Breath. cyanocobalamin (VITAMIN B12) 1,000 mcg/mL injection 1,000 mcg by IntraMUSCular route once. MONTHLY, APPROX 3/2018    cephALEXin (KEFLEX) 500 mg capsule Take four capsules one hour prior to dental appointment     No current facility-administered medications for this visit. Past Medical History:   Diagnosis Date    Arthritis     Asthma     albuterol    Carotid artery disease ([facility]     mild    DVT (deep venous thrombosis) ([facility]     Acute DVT 6/6/18 after Knee surgery 5/15/18     Elevated glucose     borderline elevated A1c    GERD (gastroesophageal reflux disease)     Hypercholesterolemia     Hypertension     Nausea & vomiting     MALINI on CPAP     Shingles         Past Surgical History:   Procedure Laterality Date    ECHO STRESS  2007    normal resting LV wall motion and no evidence of ischemia. The ejection fraction was 50-60%. HOLTER MONITOR  2007    frequent ventricular extrasystoles. Rare couplets and triplets. NSR during diary entries.      HX CATARACT REMOVAL Left 04/2019    HX HYSTERECTOMY  1986    HX ORTHOPAEDIC  1996    Right ROTATER CUFF SURGERY     HX ORTHOPAEDIC  2010    R KNEE MENISCUS    HX ORTHOPAEDIC  4/2013    left tibia repair    HX ORTHOPAEDIC  03/2012    LEFT ROTATER CUFF REPAIR     HX ORTHOPAEDIC  03/2009    THUMB TRIGGER FINGER RELEASE    HX OTHER SURGICAL      CARPAL TUNNEL BILATERAL, TRIGGER THUMB RELEASE    HX OTHER SURGICAL      Carotid dopplers 11/11- mild disease bilat    HX OTHER SURGICAL      Lexiscan Cardiolite 8/23/12 - normal; LVEF 65%    HX OTHER SURGICAL      Echo 8/23/12 - TDS, LVEF 60%, no sig valve disease, RVSP 25    HX OTHER SURGICAL      Carotid Duplex 3/14/13 - 10-49% stenosis bilat     HX OTHER SURGICAL      LE ZE's 3/14/13 - normal ZE's        Family History   Problem Relation Age of Onset    Cancer Mother         LUNG AND CERVICAL CANCER    Heart Disease Father 46    Coronary Art Dis Father     Heart Attack Father     Dementia Sister     Heart Disease Brother     Cancer Brother         COLON    Cancer Brother         THROAT CANCER    Diabetes Brother     Lung Disease Brother     Sleep Apnea Brother     Anesth Problems Neg Hx         Social History     Socioeconomic History    Marital status:      Spouse name: Not on file    Number of children: Not on file    Years of education: Not on file    Highest education level: Not on file   Occupational History    Not on file   Tobacco Use    Smoking status: Never    Smokeless tobacco: Never   Substance and Sexual Activity    Alcohol use: No     Alcohol/week: 0.0 standard drinks    Drug use: No    Sexual activity: Never   Other Topics Concern    Not on file   Social History Narrative    Not on file     Social Determinants of Health     Financial Resource Strain: Not on file   Food Insecurity: Not on file   Transportation Needs: Not on file   Physical Activity: Not on file   Stress: Not on file   Social Connections: Not on file   Intimate Partner Violence: Not on file   Housing Stability: Not on file       Orders Placed This Encounter    XR KNEE LT MIN 4 V     Standing Status:   Future     Number of Occurrences:   1     Standing Expiration Date:   12/8/2023      Callum Gee. Luis Felix M.D. An electronic signature was used to authenticate this note.

## 2023-01-10 NOTE — TELEPHONE ENCOUNTER
Pt requested to refill on bystolic 19MF, pt is out of medication           [address] [facility]

## 2023-01-11 RX ORDER — NEBIVOLOL 20 MG/1
20 TABLET ORAL DAILY
Qty: 90 TABLET | Refills: 2 | Status: SHIPPED | OUTPATIENT
Start: 2023-01-11

## 2023-01-11 NOTE — TELEPHONE ENCOUNTER
Refill per VO of Dr. Riaz Murry  Last appt: 9/6/22  Future Appointments   Date Time Provider Eliezer Conner   2/21/2023  1:00 PM Mery Dumont MD [facility] AM[facility]       Requested Prescriptions     Signed Prescriptions Disp Refills    nebivoloL (BYSTOLIC) 20 mg tablet 90 Tablet 2     Sig: Take 1 Tablet by mouth daily. Authorizing Provider: Michael Rosales     Ordering User: Shekhar      Last fill noted \"brand name only\" but I didn't see rationale for this. Called pt to ask about if generic is ok,  Complaining of dizziness. Discussed BP's running 150-140/70-80. She is ok with filling as generic.

## 2023-01-31 LAB — HBA1C MFR BLD HPLC: 5.5 %

## 2023-02-14 ENCOUNTER — OFFICE VISIT (OUTPATIENT)
Dept: CARDIOLOGY CLINIC | Age: 83
End: 2023-02-14
Payer: MEDICARE

## 2023-02-14 VITALS
HEART RATE: 62 BPM | BODY MASS INDEX: 28.71 KG/M2 | OXYGEN SATURATION: 97 % | HEIGHT: 62 IN | SYSTOLIC BLOOD PRESSURE: 148 MMHG | WEIGHT: 156 LBS | DIASTOLIC BLOOD PRESSURE: 58 MMHG

## 2023-02-14 DIAGNOSIS — I10 PRIMARY HYPERTENSION: Primary | ICD-10-CM

## 2023-02-14 DIAGNOSIS — R42 DIZZINESS: ICD-10-CM

## 2023-02-14 DIAGNOSIS — E78.00 HYPERCHOLESTEROLEMIA: ICD-10-CM

## 2023-02-14 PROCEDURE — 1101F PT FALLS ASSESS-DOCD LE1/YR: CPT | Performed by: SPECIALIST

## 2023-02-14 PROCEDURE — G8536 NO DOC ELDER MAL SCRN: HCPCS | Performed by: SPECIALIST

## 2023-02-14 PROCEDURE — 1123F ACP DISCUSS/DSCN MKR DOCD: CPT | Performed by: SPECIALIST

## 2023-02-14 PROCEDURE — G8432 DEP SCR NOT DOC, RNG: HCPCS | Performed by: SPECIALIST

## 2023-02-14 PROCEDURE — 1090F PRES/ABSN URINE INCON ASSESS: CPT | Performed by: SPECIALIST

## 2023-02-14 PROCEDURE — 3077F SYST BP >= 140 MM HG: CPT | Performed by: SPECIALIST

## 2023-02-14 PROCEDURE — 99214 OFFICE O/P EST MOD 30 MIN: CPT | Performed by: SPECIALIST

## 2023-02-14 PROCEDURE — G8417 CALC BMI ABV UP PARAM F/U: HCPCS | Performed by: SPECIALIST

## 2023-02-14 PROCEDURE — G8400 PT W/DXA NO RESULTS DOC: HCPCS | Performed by: SPECIALIST

## 2023-02-14 PROCEDURE — G0463 HOSPITAL OUTPT CLINIC VISIT: HCPCS | Performed by: SPECIALIST

## 2023-02-14 PROCEDURE — 3078F DIAST BP <80 MM HG: CPT | Performed by: SPECIALIST

## 2023-02-14 PROCEDURE — G8427 DOCREV CUR MEDS BY ELIG CLIN: HCPCS | Performed by: SPECIALIST

## 2023-02-14 NOTE — PROGRESS NOTES
Cris Arcos MD. [facility]              Patient: Jessica Stanford  :       Today's Date: 2023          HISTORY OF PRESENT ILLNESS:     History of Present Illness:  Still has dizziness. Has to sit on the edge of the bed in some mornings. Feels worse when she rolls over on left side. Does OK walking around. Class 2 HYLTON. PAST MEDICAL HISTORY:     Past Medical History:   Diagnosis Date    Arthritis     Asthma     albuterol    Carotid artery disease ([address]     mild    DVT (deep venous thrombosis) ([address]     Acute DVT 18 after Knee surgery 5/15/18     Elevated glucose     borderline elevated A1c    GERD (gastroesophageal reflux disease)     Hypercholesterolemia     Hypertension     Nausea & vomiting     MALINI on CPAP     Shingles        Past Surgical History:   Procedure Laterality Date    ECHO STRESS      normal resting LV wall motion and no evidence of ischemia. The ejection fraction was 50-60%. HOLTER MONITOR      frequent ventricular extrasystoles. Rare couplets and triplets. NSR during diary entries.      HX CATARACT REMOVAL Left 2019    HX HYSTERECTOMY      HX ORTHOPAEDIC      Right ROTATER CUFF SURGERY     HX ORTHOPAEDIC      R KNEE MENISCUS    HX ORTHOPAEDIC  2013    left tibia repair    HX ORTHOPAEDIC  2012    LEFT ROTATER CUFF REPAIR     HX ORTHOPAEDIC  2009    THUMB TRIGGER FINGER RELEASE    HX OTHER SURGICAL      CARPAL TUNNEL BILATERAL, TRIGGER THUMB RELEASE    HX OTHER SURGICAL      Carotid dopplers - mild disease bilat    HX OTHER SURGICAL      Lexiscan Cardiolite 12 - normal; LVEF 65%    HX OTHER SURGICAL      Echo 12 - TDS, LVEF 60%, no sig valve disease, RVSP 25    HX OTHER SURGICAL      Carotid Duplex 3/14/13 - 10-49% stenosis bilat     HX OTHER SURGICAL      LE ZE's 3/14/13 - normal ZE's          MEDICATIONS:     Current Outpatient Medications   Medication Sig Dispense Refill    nebivoloL (BYSTOLIC) 20 mg tablet Take 1 Tablet by mouth daily. 90 Tablet 2    cetirizine (ZYRTEC) 10 mg tablet Take 10 mg by mouth daily as needed for Allergies. rosuvastatin (CRESTOR) 10 mg tablet Take 1 Tablet by mouth nightly. 90 Tablet 1    traZODone (DESYREL) 50 mg tablet Take 50 mg by mouth nightly. amLODIPine (NORVASC) 5 mg tablet Take 1 Tablet by mouth daily. 90 Tablet 2    cephALEXin (KEFLEX) 500 mg capsule Take four capsules one hour prior to dental appointment 4 Capsule 2    multivitamin (ONE A DAY) tablet Take 1 Tab by mouth daily. albuterol (PROVENTIL HFA, VENTOLIN HFA, PROAIR HFA) 90 mcg/actuation inhaler Take 2 Puffs by inhalation as needed for Wheezing or Shortness of Breath. cyanocobalamin (VITAMIN B12) 1,000 mcg/mL injection 1,000 mcg by IntraMUSCular route once.  MONTHLY, APPROX 3/2018         Allergies   Allergen Reactions    Azithromycin Shortness of Breath     Heart slows down  Heart slows down    Tetanus Toxoid, Adsorbed Shortness of Breath    Adhesive Tape-Silicones Rash    Codeine Hives     N/V  N/V    Dilaudid [Hydromorphone (Bulk)] Nausea and Vomiting    Dilaudid [Hydromorphone] Unknown (comments)     Other reaction(s): Unknown (comments)    Ivp Dye [Fd And C Blue No.1] Hives     SWELLING    Metrizamide Hives    Novocain [Procaine] Unknown (comments)     Other reaction(s): Unknown (comments)    Sectral [Acebutolol] Hives and Swelling    Sulfa (Sulfonamide Antibiotics) Nausea and Vomiting     And hives    Sulfasalazine Hives    Talwin [Pentazocine Lactate] Swelling    Tetanus Vaccines And Toxoid Shortness of Breath    Vasotec [Enalapril Maleate] Other (comments)     ELEVATED BP           SOCIAL HISTORY:     Social History     Tobacco Use    Smoking status: Never    Smokeless tobacco: Never   Substance Use Topics    Alcohol use: No     Alcohol/week: 0.0 standard drinks    Drug use: No         FAMILY HISTORY:     Family History   Problem Relation Age of Onset    Cancer Mother         LUNG AND CERVICAL CANCER    Heart Disease Father 46    Coronary Art Dis Father     Heart Attack Father     Dementia Sister     Heart Disease Brother     Cancer Brother         COLON    Cancer Brother         THROAT CANCER    Diabetes Brother     Lung Disease Brother     Sleep Apnea Brother     Anesth Problems Neg Hx            SOCIAL HISTORY:     Social History     Tobacco Use    Smoking status: Never    Smokeless tobacco: Never   Substance Use Topics    Alcohol use: No     Alcohol/week: 0.0 standard drinks    Drug use: No         FAMILY HISTORY:     Family History   Problem Relation Age of Onset    Cancer Mother         LUNG AND CERVICAL CANCER    Heart Disease Father 46    Coronary Art Dis Father     Heart Attack Father     Dementia Sister     Heart Disease Brother     Cancer Brother         COLON    Cancer Brother         THROAT CANCER    Diabetes Brother     Lung Disease Brother     Sleep Apnea Brother     Anesth Problems Neg Hx            REVIEW OF SYMPTOMS:     Review of Symptoms:  Constitutional: Negative for fever, chills . Stays cold. HEENT: Negative for vision changes. Respiratory: no cough    Cardiovascular: Negative for orthopnea, syncope, and PND. Gastrointestinal: Negative for abdominal pain,or melena  + constipation   Genitourinary: Negative for dysuria    Musculoskeletal: Negative for myalgias. + knee pain   Skin: Negative for rash    Heme: No problems bleeding. Neurological: Negative for speech change and focal weakness. + restless leg syndrome    + Urinary frequency        PHYSICAL EXAM:     Physical Exam:  Visit Vitals  BP (!) 148/58 (BP 1 Location: Right upper arm, BP Patient Position: Sitting, BP Cuff Size: Adult)   Pulse 62   Ht 5' 2\" (1.575 m)   Wt 156 lb (70.8 kg)   SpO2 97%   BMI 28.53 kg/m²     Patient appears generally well, mood and affect are appropriate and pleasant. HEENT: Normocephalic, atraumatic.  sclera anicteric. Hearing intact.     Neck Exam: Supple, no JVD  Lung Exam: Clear to auscultation, even breath sounds. Cardiac Exam: Regular rate and rhythm with no murmur or rub   Abdomen: Soft, non-tender, normal bowel sounds. Extremities: No lower extremity edema. Psych - appropriate affect    Neuro - non focal     Vitals:    02/14/23 1332 02/14/23 1342   BP: (!) 162/66 (!) 148/58   BP 1 Location: Left upper arm Right upper arm   BP Patient Position: Sitting Sitting   BP Cuff Size: Adult Adult   Pulse: 62    Height: 5' 2\" (1.575 m)    Weight: 156 lb (70.8 kg)    SpO2: 97%            LABS / OTHER STUDIES reviewed:     Lab Results   Component Value Date/Time    Sodium 141 06/30/2021 08:09 AM    Potassium 4.2 06/30/2021 08:09 AM    Chloride 104 06/30/2021 08:09 AM    CO2 23 06/30/2021 08:09 AM    Anion gap 4 (L) 06/06/2018 07:33 PM    Glucose 110 (H) 06/30/2021 08:09 AM    BUN 12 06/30/2021 08:09 AM    Creatinine 0.96 06/30/2021 08:09 AM    BUN/Creatinine ratio 13 06/30/2021 08:09 AM    GFR est AA 65 06/30/2021 08:09 AM    GFR est non-AA 56 (L) 06/30/2021 08:09 AM    Calcium 9.5 06/30/2021 08:09 AM    Bilirubin, total 0.5 06/30/2021 08:09 AM    Alk.  phosphatase 82 06/30/2021 08:09 AM    Protein, total 6.8 06/30/2021 08:09 AM    Albumin 4.1 06/30/2021 08:09 AM    Globulin 3.5 04/09/2013 08:08 PM    A-G Ratio 1.5 06/30/2021 08:09 AM    ALT (SGPT) 9 06/30/2021 08:09 AM    AST (SGOT) 17 06/30/2021 08:09 AM     Lab Results   Component Value Date/Time    WBC 5.6 06/30/2021 08:09 AM    HGB 12.9 06/30/2021 08:09 AM    HCT 38.1 06/30/2021 08:09 AM    PLATELET 010 08/99/8354 08:09 AM    MCV 90 06/30/2021 08:09 AM       Lab Results   Component Value Date/Time    Cholesterol, total 160 06/30/2021 08:09 AM    HDL Cholesterol 39 (L) 06/30/2021 08:09 AM    LDL, calculated 99 06/30/2021 08:09 AM    LDL, calculated 89 07/07/2020 08:07 AM    VLDL, calculated 22 06/30/2021 08:09 AM    VLDL, calculated 18 07/07/2020 08:07 AM    Triglyceride 119 06/30/2021 08:09 AM       Labs 7/22 - CMP and CBC OK, A1c 5.3, chol 168, HDL 48, , TG 87       CARDIAC DIAGNOSTICS:     Cardiac Evaluation Includes:  I reviewed the results below. EKG 1/7/16 - sinus bradycardia, normal   EKG 1/19/17 - sinus marylin, otherwise normal   EKG 5/3/18 - sinus marylin, RBBB  EKG 6/4/20 - sinus marylin, RBBB  EKG 6/24/21 - sinus marylin, RBBB  EKG 8/22 - sinus, RBBB         Carotid Duplex 7/14/16 - 0-9% RITA and 36-65% LICA  Carotid Dopplers 5/18/17 - 10-49% stenosis bilat   Exercise Cardiolite 5/18/17 - walked 4:19 (7.0 METS) - normal stress EKG and MPI. LVEF 67%  Echo 5/18/17 - LVEF 60%, grade 1 diastology. RV normal.  Mild MR.  RVSP 37      Venous Doppler 6/6/18 - Right lower extremity venous duplex positive for acute deep venous thrombosis involving the distal popliteal, posterior tibial and peroneal veins.       Carotid Doppler 9/20/19 - 10-49% stenosis bilat      Carotid Doppler 12/17/20 - 10-49% bilat ICA stenosis     Lexiscan Cardiolite 7/22/21 - normal MPI, LVEF 70%  Echo 7/22/21 - LVEF 60%, mild LAE, Asc Ao mildly dilated at 3.4 cm            ASSESSMENT AND PLAN:     Assessment and Plan:    1) HTN with mild orthostatic complaints   - SBP mildly high at home with mild orthostatic symptoms   - follow orthostatic vitals   - She is tolerating Norvasc and Bystolic which we'll continue -- discussed fall precautions --> will allow a little higher BP to avoid orthostasis      2) Mild carotid artery disease   - Cont statin   - check carotids periodically       3) History HYLTON and atypical chest pain    - lexiscan cardiolite and echo were normal 5/17   - Still with HYLTON and occasional atypical CP  - Lexiscan Cardiolite 7/22/21 - normal MPI, LVEF 70%  - Echo 7/22/21 - LVEF 60%, mild LAE, Asc Ao mildly dilated at 3.4 cm       4) Dyslipidemia    - we stopped atorvastatin and zeta given muscle complaints - those symptoms are better now    - she is tolerating Crestor   - prior lipids OK - follow labs       5) Dizziness - happens when she rolls over in bed - room spins ---> I asked her to see ENT    6) See me in 6 months   Kids (she had 7) and grandkids are in the area. Juma Ventura MD, [address], [address]  [place] [address]  [address]  [address]  Ph: [phone]    [phone]

## 2023-02-14 NOTE — PROGRESS NOTES
Chief Complaint   Patient presents with    Follow-up     3 months    Cholesterol Problem    Hypertension     Vitals:    02/14/23 1332 02/14/23 1342   BP: (!) 162/66 (!) 148/58   BP 1 Location: Left upper arm Right upper arm   BP Patient Position: Sitting Sitting   BP Cuff Size: Adult Adult   Pulse: 62    Height: 5' 2\" (1.575 m)    Weight: 156 lb (70.8 kg)    SpO2: 97%      Chest pain slight; about the same   SOB slight; about the same  Palpitations denied   Swelling in hands/feet denied   Dizziness denied   Recent hospital stays denied   Refills denied     Amlodipine: asking if need to take? Sensation down the left arm.

## 2023-08-01 DIAGNOSIS — I10 ESSENTIAL (PRIMARY) HYPERTENSION: ICD-10-CM

## 2023-08-01 DIAGNOSIS — E78.00 PURE HYPERCHOLESTEROLEMIA, UNSPECIFIED: ICD-10-CM

## 2023-08-01 RX ORDER — ROSUVASTATIN CALCIUM 10 MG/1
TABLET, COATED ORAL
Qty: 90 TABLET | Refills: 1 | Status: SHIPPED | OUTPATIENT
Start: 2023-08-01

## 2023-08-03 RX ORDER — NEBIVOLOL 20 MG/1
TABLET ORAL
Qty: 90 TABLET | Refills: 3 | Status: SHIPPED | OUTPATIENT
Start: 2023-08-03

## 2023-08-30 RX ORDER — AMLODIPINE BESYLATE 5 MG/1
TABLET ORAL
Qty: 90 TABLET | Refills: 2 | Status: SHIPPED | OUTPATIENT
Start: 2023-08-30

## 2023-08-30 NOTE — TELEPHONE ENCOUNTER
Refill per VO of Dr. Cirilo Marin  Last appt: 2/14/2023    Future Appointments   Date Time Provider [address]   9/18/2023 11:00 AM David Menard MD [facility]   9/20/2023  9:15 AM Rashad Bejarano MD [facility]       Requested Prescriptions     Signed Prescriptions Disp Refills    amLODIPine (NORVASC) 5 MG tablet 90 tablet 2     Sig: TAKE 1 TABLET EVERY DAY     Authorizing Provider: David Menard     Ordering User: Foreign Olea

## 2023-09-18 ENCOUNTER — OFFICE VISIT (OUTPATIENT)
Age: 83
End: 2023-09-18
Payer: MEDICARE

## 2023-09-18 VITALS
HEART RATE: 58 BPM | OXYGEN SATURATION: 96 % | DIASTOLIC BLOOD PRESSURE: 62 MMHG | SYSTOLIC BLOOD PRESSURE: 132 MMHG | HEIGHT: 62 IN | WEIGHT: 152 LBS | BODY MASS INDEX: 27.97 KG/M2

## 2023-09-18 DIAGNOSIS — I77.9 CAROTID ARTERY DISEASE, UNSPECIFIED LATERALITY, UNSPECIFIED TYPE (HCC): Primary | ICD-10-CM

## 2023-09-18 DIAGNOSIS — I10 ESSENTIAL (PRIMARY) HYPERTENSION: ICD-10-CM

## 2023-09-18 DIAGNOSIS — E78.00 PURE HYPERCHOLESTEROLEMIA, UNSPECIFIED: ICD-10-CM

## 2023-09-18 PROCEDURE — 1036F TOBACCO NON-USER: CPT | Performed by: SPECIALIST

## 2023-09-18 PROCEDURE — 93010 ELECTROCARDIOGRAM REPORT: CPT | Performed by: SPECIALIST

## 2023-09-18 PROCEDURE — 1090F PRES/ABSN URINE INCON ASSESS: CPT | Performed by: SPECIALIST

## 2023-09-18 PROCEDURE — 99214 OFFICE O/P EST MOD 30 MIN: CPT | Performed by: SPECIALIST

## 2023-09-18 PROCEDURE — 93005 ELECTROCARDIOGRAM TRACING: CPT | Performed by: SPECIALIST

## 2023-09-18 PROCEDURE — 1123F ACP DISCUSS/DSCN MKR DOCD: CPT | Performed by: SPECIALIST

## 2023-09-18 PROCEDURE — G8427 DOCREV CUR MEDS BY ELIG CLIN: HCPCS | Performed by: SPECIALIST

## 2023-09-18 PROCEDURE — 3075F SYST BP GE 130 - 139MM HG: CPT | Performed by: SPECIALIST

## 2023-09-18 PROCEDURE — G8419 CALC BMI OUT NRM PARAM NOF/U: HCPCS | Performed by: SPECIALIST

## 2023-09-18 PROCEDURE — G8400 PT W/DXA NO RESULTS DOC: HCPCS | Performed by: SPECIALIST

## 2023-09-18 PROCEDURE — 3078F DIAST BP <80 MM HG: CPT | Performed by: SPECIALIST

## 2023-09-18 RX ORDER — ROSUVASTATIN CALCIUM 5 MG/1
5 TABLET, COATED ORAL
Qty: 45 TABLET | Refills: 3 | Status: SHIPPED | OUTPATIENT
Start: 2023-09-18

## 2023-09-18 RX ORDER — EZETIMIBE 10 MG/1
10 TABLET ORAL DAILY
Qty: 90 TABLET | Refills: 3 | Status: SHIPPED | OUTPATIENT
Start: 2023-09-18

## 2023-09-18 NOTE — PROGRESS NOTES
Room # 4  Chief Complaint   Patient presents with    Follow-up     6 months    Hypertension    Coronary Artery Disease    Hyperlipidemia     Vitals:    09/18/23 1035   BP: 132/62   Site: Left Upper Arm   Position: Sitting   Cuff Size: Medium Adult   Pulse: 58   SpO2: 96%   Weight: 152 lb (68.9 kg)   Height: 5' 2\" (1.575 m)     Chest pain denied   SOB denied   Palpitations denied   Swelling in hands/feet denied   Dizziness denied   Recent hospital stays denied   Refills denied   Claritin    Pulse fast at times.   /62 this am 58 pulse
reviewed the test results below. EKG 1/7/16 - sinus bradycardia, normal    EKG 1/19/17 - sinus chapo, otherwise normal    EKG 5/3/18 - sinus chapo, RBBB   EKG 6/4/20 - sinus chapo, RBBB   EKG 6/24/21 - sinus chapo, RBBB   EKG 8/22 - sinus, RBBB   EKG 9/18/23 - sinus chapo, RBBB            Carotid Duplex 7/14/16 - 0-9% DALE and 98-21% LICA   Carotid Dopplers 5/18/17 - 10-49% stenosis bilat    Exercise Cardiolite 5/18/17 - walked 4:19 (7.0 METS) - normal stress EKG and MPI. LVEF 67%   Echo 5/18/17 - LVEF 60%, grade 1 diastology. RV normal.  Mild MR.  RVSP 37        Venous Doppler 6/6/18 - Right lower extremity venous duplex positive for acute deep venous thrombosis involving the distal popliteal, posterior tibial and peroneal veins.         Carotid Doppler 9/20/19 - 10-49% stenosis bilat        Carotid Doppler 12/17/20 - 10-49% bilat ICA stenosis       Lexiscan Cardiolite 7/22/21 - normal MPI, LVEF 70%   Echo 7/22/21 - LVEF 60%, mild LAE, Asc Ao mildly dilated at 3.4 cm       ASSESSMENT AND PLAN:     Assessment and Plan:  1) HTN with mild orthostatic complaints    - SBP mildly high at home with mild orthostatic symptoms    - follow orthostatic vitals    - She is tolerating Norvasc and Bystolic which we'll continue -- discussed fall precautions --> will allow a little higher BP to avoid orthostasis     - On 9/18/23 --> 140s/60s at home, tolerating Norvasc and Bystolic which we'll continue      2) Mild carotid artery disease    - Cont statin   - recheck dopplers as needed        3) History MERINO and atypical chest pain     - lexiscan cardiolite and echo were normal 5/17    - Still with MERINO and occasional atypical CP   - Lexiscan Cardiolite 7/22/21 - normal MPI, LVEF 70%   - Echo 7/22/21 - LVEF 60%, mild LAE, Asc Ao mildly dilated at 3.4 cm   - Can repeat testing for worsening symptoms         4) Dyslipidemia     - we stopped atorvastatin and zetia given muscle complaints - those symptoms are better now     - On 9/18/23

## 2023-10-20 LAB
ALBUMIN SERPL-MCNC: 4.5 G/DL (ref 3.7–4.7)
ALBUMIN/GLOB SERPL: 1.8 {RATIO} (ref 1.2–2.2)
ALP SERPL-CCNC: 82 IU/L (ref 44–121)
ALT SERPL-CCNC: 9 IU/L (ref 0–32)
AST SERPL-CCNC: 21 IU/L (ref 0–40)
BILIRUB SERPL-MCNC: 0.6 MG/DL (ref 0–1.2)
BUN SERPL-MCNC: 11 MG/DL (ref 8–27)
BUN/CREAT SERPL: 12 (ref 12–28)
CALCIUM SERPL-MCNC: 9.4 MG/DL (ref 8.7–10.3)
CHLORIDE SERPL-SCNC: 105 MMOL/L (ref 96–106)
CHOLEST SERPL-MCNC: 194 MG/DL (ref 100–199)
CO2 SERPL-SCNC: 25 MMOL/L (ref 20–29)
CREAT SERPL-MCNC: 0.93 MG/DL (ref 0.57–1)
EGFRCR SERPLBLD CKD-EPI 2021: 61 ML/MIN/1.73
GLOBULIN SER CALC-MCNC: 2.5 G/DL (ref 1.5–4.5)
GLUCOSE SERPL-MCNC: 103 MG/DL (ref 70–99)
HDLC SERPL-MCNC: 52 MG/DL
IMP & REVIEW OF LAB RESULTS: NORMAL
LDLC SERPL CALC-MCNC: 125 MG/DL (ref 0–99)
POTASSIUM SERPL-SCNC: 4 MMOL/L (ref 3.5–5.2)
PROT SERPL-MCNC: 7 G/DL (ref 6–8.5)
SODIUM SERPL-SCNC: 143 MMOL/L (ref 134–144)
TRIGL SERPL-MCNC: 96 MG/DL (ref 0–149)
VLDLC SERPL CALC-MCNC: 17 MG/DL (ref 5–40)

## 2023-11-07 ENCOUNTER — TELEPHONE (OUTPATIENT)
Age: 83
End: 2023-11-07

## 2023-11-07 DIAGNOSIS — I77.9 CAROTID ARTERY DISEASE, UNSPECIFIED LATERALITY, UNSPECIFIED TYPE (HCC): Primary | ICD-10-CM

## 2023-11-07 DIAGNOSIS — E78.00 PURE HYPERCHOLESTEROLEMIA, UNSPECIFIED: ICD-10-CM

## 2023-11-07 DIAGNOSIS — R07.9 CHEST PAIN, UNSPECIFIED: ICD-10-CM

## 2023-11-07 DIAGNOSIS — I10 ESSENTIAL (PRIMARY) HYPERTENSION: ICD-10-CM

## 2023-11-07 NOTE — TELEPHONE ENCOUNTER
R/t call to pt,  Confirmed that she should be taking Crestor 5 mg 3 times a week, zetia daily. She has not been doing this. Confirmed NOV 3/18/24.

## 2023-11-07 NOTE — TELEPHONE ENCOUNTER
Pt. Is asking for her lab results from 10/19. Also wants to know if she should be taking Zetia. Pt.  Phone - [phone]

## 2023-11-09 NOTE — TELEPHONE ENCOUNTER
Per Dr. Shira Garrett: \"Yes - take cholesterol meds as prescribed and recheck labs (CMP, lipids) in March. \"    Future Appointments   Date Time Provider [address] [place] [address]   3/18/2024 10:40 AM Shira Garrett MD [facility]

## 2024-03-07 LAB
ALBUMIN/GLOB SERPL: 1.5 {RATIO} (ref 1.2–2.2)
ALP SERPL-CCNC: 77 IU/L (ref 44–121)
ALT SERPL-CCNC: 13 IU/L (ref 0–32)
AST SERPL-CCNC: 19 IU/L (ref 0–40)
BILIRUB SERPL-MCNC: 0.3 MG/DL (ref 0–1.2)
CALCIUM SERPL-MCNC: 9.5 MG/DL (ref 8.7–10.3)
CHOLEST SERPL-MCNC: 179 MG/DL (ref 100–199)
CO2 SERPL-SCNC: 22 MMOL/L (ref 20–29)
CREAT SERPL-MCNC: 0.93 MG/DL (ref 0.57–1)
EGFRCR SERPLBLD CKD-EPI 2021: 61 ML/MIN/1.73
GLOBULIN SER CALC-MCNC: 2.7 G/DL (ref 1.5–4.5)
GLUCOSE SERPL-MCNC: 100 MG/DL (ref 70–99)
HDLC SERPL-MCNC: 50 MG/DL
LDLC SERPL CALC-MCNC: 113 MG/DL (ref 0–99)
POTASSIUM SERPL-SCNC: 4 MMOL/L (ref 3.5–5.2)
PROT SERPL-MCNC: 6.8 G/DL (ref 6–8.5)
SODIUM SERPL-SCNC: 139 MMOL/L (ref 134–144)
TRIGL SERPL-MCNC: 86 MG/DL (ref 0–149)
VLDLC SERPL CALC-MCNC: 16 MG/DL (ref 5–40)

## 2024-03-19 ENCOUNTER — OFFICE VISIT (OUTPATIENT)
Age: 84
End: 2024-03-19
Payer: MEDICARE

## 2024-03-19 VITALS
OXYGEN SATURATION: 97 % | HEIGHT: 62 IN | BODY MASS INDEX: 26.57 KG/M2 | SYSTOLIC BLOOD PRESSURE: 122 MMHG | WEIGHT: 144.4 LBS | DIASTOLIC BLOOD PRESSURE: 80 MMHG | HEART RATE: 56 BPM | RESPIRATION RATE: 18 BRPM

## 2024-03-19 DIAGNOSIS — I10 ESSENTIAL (PRIMARY) HYPERTENSION: Primary | ICD-10-CM

## 2024-03-19 DIAGNOSIS — E78.00 PURE HYPERCHOLESTEROLEMIA, UNSPECIFIED: ICD-10-CM

## 2024-03-19 PROCEDURE — 99214 OFFICE O/P EST MOD 30 MIN: CPT | Performed by: SPECIALIST

## 2024-03-19 PROCEDURE — 1036F TOBACCO NON-USER: CPT | Performed by: SPECIALIST

## 2024-03-19 PROCEDURE — 1090F PRES/ABSN URINE INCON ASSESS: CPT | Performed by: SPECIALIST

## 2024-03-19 PROCEDURE — 3074F SYST BP LT 130 MM HG: CPT | Performed by: SPECIALIST

## 2024-03-19 PROCEDURE — G8427 DOCREV CUR MEDS BY ELIG CLIN: HCPCS | Performed by: SPECIALIST

## 2024-03-19 PROCEDURE — 1123F ACP DISCUSS/DSCN MKR DOCD: CPT | Performed by: SPECIALIST

## 2024-03-19 PROCEDURE — G8419 CALC BMI OUT NRM PARAM NOF/U: HCPCS | Performed by: SPECIALIST

## 2024-03-19 PROCEDURE — G8400 PT W/DXA NO RESULTS DOC: HCPCS | Performed by: SPECIALIST

## 2024-03-19 PROCEDURE — G8484 FLU IMMUNIZE NO ADMIN: HCPCS | Performed by: SPECIALIST

## 2024-03-19 PROCEDURE — 3079F DIAST BP 80-89 MM HG: CPT | Performed by: SPECIALIST

## 2024-03-19 RX ORDER — ACETAMINOPHEN 160 MG
2000 TABLET,DISINTEGRATING ORAL DAILY
COMMUNITY

## 2024-03-19 RX ORDER — AMLODIPINE BESYLATE 5 MG/1
5 TABLET ORAL DAILY
Qty: 90 TABLET | Refills: 3 | Status: SHIPPED | OUTPATIENT
Start: 2024-03-19

## 2024-03-19 NOTE — PATIENT INSTRUCTIONS
serving is 1 slice of bread, 1 ounce of dry cereal, or 1/2 cup of cooked rice, pasta, or cooked cereal. Try to choose whole-grain products as much as possible.  Limit lean meat, poultry, and fish to 6 ounces or less each day. One egg counts as 1 ounce.  Eat 4 to 5 servings of nuts, seeds, and legumes (cooked dried beans, lentils, and split peas) each week. A serving is 1/3 cup of nuts, 2 tablespoons of seeds, 2 tablespoons of peanut butter, or 1/2 cup of cooked beans or peas.  Limit fats and oils to 2 to 3 servings each day. A serving is 1 teaspoon of vegetable oil or 2 tablespoons of salad dressing.  Limit sweets and added sugars to 5 servings or less a week. A serving is 1 tablespoon jelly or jam, 1/2 cup sorbet, or 1 cup of lemonade.  Eat less than 2,300 milligrams (mg) of sodium a day. If you limit your sodium to 1,500 mg a day, you can lower your blood pressure even more.  Be aware that all of these are the suggested number of servings for people who eat 1,800 to 2,000 calories a day. Your recommended number of servings may be different if you need more or fewer calories.  Tips for success  Start small. Make small changes, and stick with them. Once those changes become habit, add a few more changes.  Try some of the following:  Make it a goal to eat a fruit or vegetable at every meal and at snacks. This will make it easy to get the recommended amount of fruits and vegetables each day.  Try yogurt topped with fruit and nuts for a snack or healthy dessert.  Add lettuce, tomato, cucumber, and onion to sandwiches.  Have a variety of cut-up vegetables with a low-fat dip as an appetizer instead of chips and dip.  Sprinkle sunflower seeds or chopped almonds over salads. Or try adding chopped walnuts or almonds to cooked vegetables.  Try some vegetarian meals using beans and peas. Add garbanzo or kidney beans to salads. Make burritos and tacos with mashed [place] beans or black beans.  Where can you learn more?  Go to

## 2024-03-19 NOTE — PROGRESS NOTES
Chief Complaint   Patient presents with    Coronary Artery Disease    Hyperlipidemia    Hypertension    Follow-up     6 month     Vitals:    03/19/24 1256   BP: 122/80   Site: Left Upper Arm   Position: Sitting   Cuff Size: Medium Adult   Pulse: 56   Resp: 18   SpO2: 97%   Weight: 65.5 kg (144 lb 6.4 oz)   Height: 1.575 m (5' 2\")     No active chest pain/SOB  No recent hospitalizations/urgent care visits  90 day supply refill needed for the Amlodipine 5 mg

## 2024-03-19 NOTE — PROGRESS NOTES
Ying Jones MD. [facility]          Patient: Lorena Alfaro  : 1940      Today's Date: 3/19/2024        HISTORY OF PRESENT ILLNESS:     History of Present Illness:    Has some chest pain at times - random - feels OK walking - nothing new.   Has joint pain.         PAST MEDICAL HISTORY:     Past Medical History:   Diagnosis Date    Arthritis     Asthma     albuterol    Carotid artery disease (HCC)     mild    DVT (deep venous thrombosis) (HCC)     Acute DVT 18 after Knee surgery 5/15/18     Elevated glucose     borderline elevated A1c    GERD (gastroesophageal reflux disease)     Hypercholesterolemia     Hypertension     Nausea & vomiting     ALEJANDRO on CPAP     Shingles        Past Surgical History:   Procedure Laterality Date    CATARACT REMOVAL Left 2019    ECHO STRESS  2007    normal resting LV wall motion and no evidence of ischemia. The ejection fraction was 50-60%.    HOLTER MONITOR      frequent ventricular extrasystoles. Rare couplets and triplets. NSR during diary entries.     HYSTERECTOMY (CERVIX STATUS UNKNOWN)      ORTHOPEDIC SURGERY  2009    THUMB TRIGGER FINGER RELEASE    ORTHOPEDIC SURGERY  2012    LEFT ROTATER CUFF REPAIR     ORTHOPEDIC SURGERY  2013    left tibia repair    ORTHOPEDIC SURGERY      R KNEE MENISCUS    ORTHOPEDIC SURGERY      Right ROTATER CUFF SURGERY     OTHER SURGICAL HISTORY      Carotid dopplers - mild disease bilat    OTHER SURGICAL HISTORY      CARPAL TUNNEL BILATERAL, TRIGGER THUMB RELEASE    OTHER SURGICAL HISTORY      LE ZEYNEP's 3/14/13 - normal ZEYNEP's     OTHER SURGICAL HISTORY      Carotid Duplex 3/14/13 - 10-49% stenosis bilat     OTHER SURGICAL HISTORY      Lexiscan Cardiolite 12 - normal; LVEF 65%    OTHER SURGICAL HISTORY      Echo 12 - TDS, LVEF 60%, no sig valve disease, RVSP 25             CURRENT MEDICATIONS:    .  Current Outpatient Medications   Medication Sig Dispense Refill    Magnesium 200 MG CHEW Take by mouth

## 2024-05-01 NOTE — TELEPHONE ENCOUNTER
Lab slip mailed. Miguelina, Home Care PT with [facility] calling to request verbal orders for the following:    PT 1x per week for 4 weeks (first set of orders after eval)    Routing to PCP to please review and advise on requested home care orders - thank you!     Callback to Miguelina [phone] - ok to leave detailed VM (confidential)    Kimmie Chen RN  [facility]

## 2024-05-07 DIAGNOSIS — I10 ESSENTIAL (PRIMARY) HYPERTENSION: ICD-10-CM

## 2024-05-07 DIAGNOSIS — E78.00 PURE HYPERCHOLESTEROLEMIA, UNSPECIFIED: ICD-10-CM

## 2024-05-07 NOTE — TELEPHONE ENCOUNTER
Refill per VO of Dr. Jones  Last appt: 3/19/2024    Future Appointments   Date Time Provider Department Center   8/7/2024 10:00 AM Jonathan Holden MD [facility] AM[facility]   9/20/2024 10:20 AM Ying Jones MD [facility]       Requested Prescriptions     Pending Prescriptions Disp Refills    rosuvastatin (CRESTOR) 5 MG tablet [Pharmacy Med Name: rosuvastatin 5 mg tablet] 45 tablet 11     Sig: TAKE ONE TABLET BY MOUTH THREE TIMES A WEEK     Need to call  pt, confirm they are requesting to change to daily.

## 2024-05-09 RX ORDER — ROSUVASTATIN CALCIUM 5 MG/1
5 TABLET, COATED ORAL DAILY
Qty: 90 TABLET | Refills: 3 | Status: SHIPPED | OUTPATIENT
Start: 2024-05-09

## 2024-05-09 NOTE — TELEPHONE ENCOUNTER
Telephone call to patient and confirmed patient is taking rosuvastatin 5mg tab daily. Patient reports that she is tolerating well no myalgia.

## 2024-07-15 RX ORDER — NEBIVOLOL 20 MG/1
TABLET ORAL
Qty: 90 TABLET | Refills: 3 | Status: SHIPPED | OUTPATIENT
Start: 2024-07-15

## 2024-07-15 NOTE — TELEPHONE ENCOUNTER
Refill per VO of Dr. Jones  Last appt: 3/19/2024    Future Appointments   Date Time Provider Department Center   8/7/2024 10:00 AM Jonathan Holden MD [facility] AM[facility]   9/20/2024 10:20 AM Ying Jones MD [facility]       Requested Prescriptions     Pending Prescriptions Disp Refills    nebivolol (BYSTOLIC) 20 MG TABS tablet [Pharmacy Med Name: nebivolol 20 mg tablet] 90 tablet 3     Sig: TAKE ONE TABLET BY MOUTH EVERY DAY

## 2024-11-01 ENCOUNTER — OFFICE VISIT (OUTPATIENT)
Age: 84
End: 2024-11-01
Payer: MEDICARE

## 2024-11-01 VITALS
DIASTOLIC BLOOD PRESSURE: 70 MMHG | HEART RATE: 58 BPM | WEIGHT: 147 LBS | SYSTOLIC BLOOD PRESSURE: 130 MMHG | HEIGHT: 62 IN | BODY MASS INDEX: 27.05 KG/M2 | OXYGEN SATURATION: 98 %

## 2024-11-01 DIAGNOSIS — E78.00 PURE HYPERCHOLESTEROLEMIA, UNSPECIFIED: ICD-10-CM

## 2024-11-01 DIAGNOSIS — I10 ESSENTIAL (PRIMARY) HYPERTENSION: Primary | ICD-10-CM

## 2024-11-01 PROCEDURE — 99214 OFFICE O/P EST MOD 30 MIN: CPT | Performed by: SPECIALIST

## 2024-11-01 PROCEDURE — 93005 ELECTROCARDIOGRAM TRACING: CPT | Performed by: SPECIALIST

## 2024-11-01 RX ORDER — EZETIMIBE 10 MG/1
10 TABLET ORAL DAILY
Qty: 90 TABLET | Refills: 3 | Status: SHIPPED | OUTPATIENT
Start: 2024-11-01

## 2024-11-01 NOTE — PROGRESS NOTES
Ying Jones MD. [facility]          Patient: Lorena Alfaro  : 1940      Today's Date: 2024        HISTORY OF PRESENT ILLNESS:     History of Present Illness:    Overall doing OK - no new complaints.    Tired.  Mild ankle swelling at times (doppler OK per patient).         PAST MEDICAL HISTORY:     Past Medical History:   Diagnosis Date    Arthritis     Asthma     albuterol    Carotid artery disease (HCC)     mild    DVT (deep venous thrombosis) (HCC)     Acute DVT 18 after Knee surgery 5/15/18     Elevated glucose     borderline elevated A1c    GERD (gastroesophageal reflux disease)     Hypercholesterolemia     Hypertension     Nausea & vomiting     ALEJANDRO on CPAP     Shingles        Past Surgical History:   Procedure Laterality Date    CATARACT REMOVAL Left 2019    ECHO STRESS      normal resting LV wall motion and no evidence of ischemia. The ejection fraction was 50-60%.    HOLTER MONITOR      frequent ventricular extrasystoles. Rare couplets and triplets. NSR during diary entries.     HYSTERECTOMY (CERVIX STATUS UNKNOWN)      ORTHOPEDIC SURGERY  2009    THUMB TRIGGER FINGER RELEASE    ORTHOPEDIC SURGERY  2012    LEFT ROTATER CUFF REPAIR     ORTHOPEDIC SURGERY  2013    left tibia repair    ORTHOPEDIC SURGERY      R KNEE MENISCUS    ORTHOPEDIC SURGERY      Right ROTATER CUFF SURGERY     OTHER SURGICAL HISTORY      Carotid dopplers - mild disease bilat    OTHER SURGICAL HISTORY      CARPAL TUNNEL BILATERAL, TRIGGER THUMB RELEASE    OTHER SURGICAL HISTORY      LE ZEYNEP's 3/14/13 - normal ZEYNEP's     OTHER SURGICAL HISTORY      Carotid Duplex 3/14/13 - 10-49% stenosis bilat     OTHER SURGICAL HISTORY      Lexiscan Cardiolite 12 - normal; LVEF 65%    OTHER SURGICAL HISTORY      Echo 12 - TDS, LVEF 60%, no sig valve disease, RVSP 25             CURRENT MEDICATIONS:    .  Current Outpatient Medications   Medication Sig Dispense Refill    nebivolol (BYSTOLIC) 20

## 2024-11-01 NOTE — PATIENT INSTRUCTIONS
Patient Education        Learning About the Mediterranean Diet  What is the Mediterranean diet?     The Mediterranean diet is a style of eating rather than a diet plan. It features foods eaten in [place], [place], [place], and other countries along the [place] Sea. It emphasizes eating foods like fish, fruits, vegetables, beans, high-fiber breads and whole grains, nuts, and olive oil. This style of eating includes limited red meat, cheese, and sweets.  Why choose the Mediterranean diet?  A Mediterranean-style diet may improve heart health. It contains more fat than other heart-healthy diets. But the fats are mainly from nuts, unsaturated oils (such as fish oils and olive oil), and certain nut or seed oils (such as canola, soybean, or flaxseed oil). These fats may help protect the heart and blood vessels.  How can you get started on the Mediterranean diet?  Here are some things you can do to switch to a more Mediterranean way of eating.  What to eat  Eat a variety of fruits and vegetables each day, such as grapes, blueberries, tomatoes, broccoli, peppers, figs, olives, spinach, eggplant, beans, lentils, and chickpeas.  Eat a variety of whole-grain foods each day, such as oats, brown rice, and whole wheat bread, pasta, and couscous.  Eat fish at least 2 times a week. Try tuna, salmon, mackerel, lake trout, herring, or sardines.  Eat moderate amounts of low-fat dairy products, such as milk, cheese, or yogurt.  Eat moderate amounts of poultry and eggs.  Choose healthy (unsaturated) fats, such as nuts, olive oil, and certain nut or seed oils like canola, soybean, and flaxseed.  Limit unhealthy (saturated) fats, such as butter, palm oil, and coconut oil. And limit fats found in animal products, such as meat and dairy products made with whole milk. Try to eat red meat only a few times a month in very small amounts.  Limit sweets and desserts to only a few times a week. This includes sugar-sweetened

## 2024-11-01 NOTE — PROGRESS NOTES
Chief Complaint   Patient presents with    Hypertension    Cholesterol Problem     Vitals:    11/01/24 1047   BP: 130/70   Site: Left Upper Arm   Position: Sitting   Cuff Size: Medium Adult   Pulse: 58   SpO2: 98%   Weight: 66.7 kg (147 lb)   Height: 1.575 m (5' 2\")      /70 (Site: Left Upper Arm, Position: Sitting, Cuff Size: Medium Adult)   Pulse 58   Ht 1.575 m (5' 2\")   Wt 66.7 kg (147 lb)   SpO2 98%   BMI 26.89 kg/m²

## 2024-11-01 NOTE — PROGRESS NOTES
Ying Jones MD. [facility]          Patient: Lorena Alfaro  : 1940      Today's Date: 2024        HISTORY OF PRESENT ILLNESS:     History of Present Illness:    Has some chest pain at times - random - feels OK walking - nothing new.   Has joint pain.         PAST MEDICAL HISTORY:     Past Medical History:   Diagnosis Date    Arthritis     Asthma     albuterol    Carotid artery disease (HCC)     mild    DVT (deep venous thrombosis) (HCC)     Acute DVT 18 after Knee surgery 5/15/18     Elevated glucose     borderline elevated A1c    GERD (gastroesophageal reflux disease)     Hypercholesterolemia     Hypertension     Nausea & vomiting     ALEJANDRO on CPAP     Shingles        Past Surgical History:   Procedure Laterality Date    CATARACT REMOVAL Left 2019    ECHO STRESS  2007    normal resting LV wall motion and no evidence of ischemia. The ejection fraction was 50-60%.    HOLTER MONITOR      frequent ventricular extrasystoles. Rare couplets and triplets. NSR during diary entries.     HYSTERECTOMY (CERVIX STATUS UNKNOWN)      ORTHOPEDIC SURGERY  2009    THUMB TRIGGER FINGER RELEASE    ORTHOPEDIC SURGERY  2012    LEFT ROTATER CUFF REPAIR     ORTHOPEDIC SURGERY  2013    left tibia repair    ORTHOPEDIC SURGERY      R KNEE MENISCUS    ORTHOPEDIC SURGERY      Right ROTATER CUFF SURGERY     OTHER SURGICAL HISTORY      Carotid dopplers - mild disease bilat    OTHER SURGICAL HISTORY      CARPAL TUNNEL BILATERAL, TRIGGER THUMB RELEASE    OTHER SURGICAL HISTORY      LE ZEYNEP's 3/14/13 - normal ZEYNEP's     OTHER SURGICAL HISTORY      Carotid Duplex 3/14/13 - 10-49% stenosis bilat     OTHER SURGICAL HISTORY      Lexiscan Cardiolite 12 - normal; LVEF 65%    OTHER SURGICAL HISTORY      Echo 12 - TDS, LVEF 60%, no sig valve disease, RVSP 25             CURRENT MEDICATIONS:    .  Current Outpatient Medications   Medication Sig Dispense Refill    nebivolol (BYSTOLIC) 20 MG TABS tablet

## 2025-01-21 RX ORDER — AMLODIPINE BESYLATE 5 MG/1
5 TABLET ORAL DAILY
Qty: 90 TABLET | Refills: 1 | Status: SHIPPED | OUTPATIENT
Start: 2025-01-21

## 2025-03-26 ENCOUNTER — TELEPHONE (OUTPATIENT)
Age: 85
End: 2025-03-26

## 2025-04-25 DIAGNOSIS — E78.00 PURE HYPERCHOLESTEROLEMIA, UNSPECIFIED: ICD-10-CM

## 2025-04-25 DIAGNOSIS — I10 ESSENTIAL (PRIMARY) HYPERTENSION: ICD-10-CM

## 2025-04-25 RX ORDER — ROSUVASTATIN CALCIUM 5 MG/1
5 TABLET, COATED ORAL DAILY
Qty: 90 TABLET | Refills: 2 | Status: SHIPPED | OUTPATIENT
Start: 2025-04-25

## 2025-04-25 NOTE — TELEPHONE ENCOUNTER
Refill per VO of Dr. Jones  Last appt: 11/1/2024    Future Appointments   Date Time Provider Department Center   5/21/2025 11:20 AM Ericka Abdul, APRN - NP [facility] AM[facility]       Requested Prescriptions     Signed Prescriptions Disp Refills    rosuvastatin (CRESTOR) 5 MG tablet 90 tablet 2     Sig: Take 1 tablet by mouth daily     Authorizing Provider: BOO JONES     Ordering User: CRISTINA RODRIGUEZ

## 2025-05-16 NOTE — PROGRESS NOTES
Patient: Lorena Alfaro  : 1940    Primary Cardiologist: Ying Jones MD. [facility]    Today's Date: 2025        HISTORY OF PRESENT ILLNESS:     History of Present Illness:  Presents today for follow-up. Denies exertional chest pain complaints. Uses CPAP. Denies worsening shortness of breath. Says her ankles swell. She has restless leg syndrome. Says she needs a knee replacement. Prefers management with cortisone injections. Denies palpitations, dizziness.     PAST MEDICAL HISTORY:     Past Medical History:   Diagnosis Date    Arthritis     Asthma     albuterol    Carotid artery disease     mild    DVT (deep venous thrombosis) ([facility])     Acute DVT 18 after Knee surgery 5/15/18     Elevated glucose     borderline elevated A1c    GERD (gastroesophageal reflux disease)     Hypercholesterolemia     Hypertension     Nausea & vomiting     ALEJANDRO on CPAP     Shingles        Past Surgical History:   Procedure Laterality Date    CATARACT REMOVAL Left 2019    ECHO STRESS  2007    normal resting LV wall motion and no evidence of ischemia. The ejection fraction was 50-60%.    HOLTER MONITOR      frequent ventricular extrasystoles. Rare couplets and triplets. NSR during diary entries.     HYSTERECTOMY (CERVIX STATUS UNKNOWN)  1986    ORTHOPEDIC SURGERY  2009    THUMB TRIGGER FINGER RELEASE    ORTHOPEDIC SURGERY  2012    LEFT ROTATER CUFF REPAIR     ORTHOPEDIC SURGERY  2013    left tibia repair    ORTHOPEDIC SURGERY      R KNEE MENISCUS    ORTHOPEDIC SURGERY      Right ROTATER CUFF SURGERY     OTHER SURGICAL HISTORY      Carotid dopplers - mild disease bilat    OTHER SURGICAL HISTORY      CARPAL TUNNEL BILATERAL, TRIGGER THUMB RELEASE    OTHER SURGICAL HISTORY      LE ZEYNEP's 3/14/13 - normal ZEYNEP's     OTHER SURGICAL HISTORY      Carotid Duplex 3/14/13 - 10-49% stenosis bilat     OTHER SURGICAL HISTORY      Lexiscan Cardiolite 12 - normal; LVEF 65%    OTHER SURGICAL HISTORY

## 2025-05-21 ENCOUNTER — OFFICE VISIT (OUTPATIENT)
Age: 85
End: 2025-05-21
Payer: MEDICARE

## 2025-05-21 VITALS
SYSTOLIC BLOOD PRESSURE: 130 MMHG | OXYGEN SATURATION: 98 % | HEIGHT: 60 IN | DIASTOLIC BLOOD PRESSURE: 70 MMHG | WEIGHT: 137.4 LBS | BODY MASS INDEX: 26.97 KG/M2 | HEART RATE: 55 BPM

## 2025-05-21 DIAGNOSIS — E78.00 PURE HYPERCHOLESTEROLEMIA, UNSPECIFIED: ICD-10-CM

## 2025-05-21 DIAGNOSIS — I10 ESSENTIAL (PRIMARY) HYPERTENSION: ICD-10-CM

## 2025-05-21 DIAGNOSIS — I77.9 CAROTID ARTERY DISEASE, UNSPECIFIED LATERALITY, UNSPECIFIED TYPE: Primary | ICD-10-CM

## 2025-05-21 PROCEDURE — 1160F RVW MEDS BY RX/DR IN RCRD: CPT

## 2025-05-21 PROCEDURE — 3078F DIAST BP <80 MM HG: CPT

## 2025-05-21 PROCEDURE — G8400 PT W/DXA NO RESULTS DOC: HCPCS

## 2025-05-21 PROCEDURE — 1126F AMNT PAIN NOTED NONE PRSNT: CPT

## 2025-05-21 PROCEDURE — 99214 OFFICE O/P EST MOD 30 MIN: CPT

## 2025-05-21 PROCEDURE — 1090F PRES/ABSN URINE INCON ASSESS: CPT

## 2025-05-21 PROCEDURE — 1123F ACP DISCUSS/DSCN MKR DOCD: CPT

## 2025-05-21 PROCEDURE — 1036F TOBACCO NON-USER: CPT

## 2025-05-21 PROCEDURE — 1159F MED LIST DOCD IN RCRD: CPT

## 2025-05-21 PROCEDURE — 3075F SYST BP GE 130 - 139MM HG: CPT

## 2025-05-21 PROCEDURE — G8427 DOCREV CUR MEDS BY ELIG CLIN: HCPCS

## 2025-05-21 PROCEDURE — G8419 CALC BMI OUT NRM PARAM NOF/U: HCPCS

## 2025-07-24 RX ORDER — AMLODIPINE BESYLATE 5 MG/1
5 TABLET ORAL DAILY
Qty: 90 TABLET | Refills: 3 | Status: SHIPPED | OUTPATIENT
Start: 2025-07-24

## 2025-07-24 RX ORDER — NEBIVOLOL 20 MG/1
1 TABLET ORAL DAILY
Qty: 90 TABLET | Refills: 3 | Status: SHIPPED | OUTPATIENT
Start: 2025-07-24

## (undated) DEVICE — SCRUB DRY SURG EZ SCRUB BRUSH PREOPERATIVE GRN

## (undated) DEVICE — SYR 10ML LUER LOK 1/5ML GRAD --

## (undated) DEVICE — SOLUTION IRRIG 3000ML 0.9% SOD CHL FLX CONT 0797208] ICU MEDICAL INC]

## (undated) DEVICE — SPONGE LAP 18X18IN STRL -- 5/PK

## (undated) DEVICE — CUSTOM CAST PD STR

## (undated) DEVICE — HANDPIECE SET WITH BONE CLEANING TIP AND SUCTION TUBE: Brand: INTERPULSE

## (undated) DEVICE — Z DISCONTINUED USE 2744636  DRESSING AQUACEL 14 IN ALG W3.5XL14IN POLYUR FLM CVR W/ HYDRCOLL

## (undated) DEVICE — HOOK LOCK LATEX FREE ELASTIC BANDAGE D/L 6INX10YD

## (undated) DEVICE — Device

## (undated) DEVICE — 3M™ IOBAN™ 2 ANTIMICROBIAL INCISE DRAPE 6651EZ: Brand: IOBAN™ 2

## (undated) DEVICE — SUTURE STRATAFIX SPRL SZ 1 L14IN ABSRB VLT L48CM CTX 1/2 SXPD2B405

## (undated) DEVICE — T5 HOOD WITH PEEL AWAY FACE SHIELD

## (undated) DEVICE — SUTURE VCRL SZ 2-0 L36IN ABSRB UD L40MM CT 1/2 CIR J957H

## (undated) DEVICE — SYRINGE MED 20ML STD CLR PLAS LUERLOCK TIP N CTRL DISP

## (undated) DEVICE — GAUZE SPONGES,12 PLY: Brand: CURITY

## (undated) DEVICE — NEEDLE HYPO 21GA L1.5IN INTRAMUSCULAR S STL LATCH BVL UP

## (undated) DEVICE — GOWN,PREVENTION PLUS,XLN/2XL,ST,22/CS: Brand: MEDLINE

## (undated) DEVICE — CARTRIDGE BNE CEM MIX UNIV TWR VAC ROTOR BRK OFF NOZ W/O

## (undated) DEVICE — INFECTION CONTROL KIT SYS

## (undated) DEVICE — STRYKER PERFORMANCE SERIES SAGITTAL BLADE: Brand: STRYKER PERFORMANCE SERIES

## (undated) DEVICE — SOLUTION IV 50ML 0.9% SOD CHL

## (undated) DEVICE — SUTURE VCRL 1 L27IN ABSRB CT BRAID COAT UD J281H

## (undated) DEVICE — 4-PORT MANIFOLD: Brand: NEPTUNE 2

## (undated) DEVICE — DEVON™ KNEE AND BODY STRAP 60" X 3" (1.5 M X 7.6 CM): Brand: DEVON

## (undated) DEVICE — (D)PREP SKN CHLRAPRP APPL 26ML -- CONVERT TO ITEM 371833

## (undated) DEVICE — SOLUTION IRRIG 1000ML H2O STRL BLT

## (undated) DEVICE — DRAPE,EXTREMITY,89X128,STERILE: Brand: MEDLINE

## (undated) DEVICE — APPLICATOR BNDG 1MM ADH PREMIERPRO EXOFIN

## (undated) DEVICE — STERILE POLYISOPRENE POWDER-FREE SURGICAL GLOVES WITH EMOLLIENT COATING: Brand: PROTEXIS

## (undated) DEVICE — STERILE POLYISOPRENE POWDER-FREE SURGICAL GLOVES: Brand: PROTEXIS

## (undated) DEVICE — ZIMMER® STERILE DISPOSABLE TOURNIQUET CUFF WITH PLC, DUAL PORT, SINGLE BLADDER, 34 IN. (86 CM)

## (undated) DEVICE — SUTURE MCRYL SZ 3-0 L27IN ABSRB UD L24MM PS-1 3/8 CIR PRIM Y936H

## (undated) DEVICE — REM POLYHESIVE ADULT PATIENT RETURN ELECTRODE: Brand: VALLEYLAB

## (undated) DEVICE — CONTAINER,SPECIMEN,3OZ,OR STRL: Brand: MEDLINE

## (undated) DEVICE — X-RAY SPONGES,16 PLY: Brand: DERMACEA

## (undated) DEVICE — PREP SKN PREVAIL 40ML APPL --

## (undated) DEVICE — TRAY CATH W/ 16FR CATH URIN M CTRL FIT OUTLT TB F STATLOK